# Patient Record
Sex: FEMALE | Race: WHITE | NOT HISPANIC OR LATINO | Employment: OTHER | URBAN - METROPOLITAN AREA
[De-identification: names, ages, dates, MRNs, and addresses within clinical notes are randomized per-mention and may not be internally consistent; named-entity substitution may affect disease eponyms.]

---

## 2023-05-03 DIAGNOSIS — R09.81 NASAL CONGESTION: ICD-10-CM

## 2023-05-03 RX ORDER — FLUTICASONE PROPIONATE 50 MCG
SPRAY, SUSPENSION (ML) NASAL
Qty: 48 ML | Refills: 2 | Status: SHIPPED | OUTPATIENT
Start: 2023-05-03 | End: 2024-02-21

## 2023-05-09 DIAGNOSIS — I10 ESSENTIAL (PRIMARY) HYPERTENSION: ICD-10-CM

## 2023-05-09 RX ORDER — AMLODIPINE BESYLATE 5 MG/1
TABLET ORAL
Qty: 90 TABLET | Refills: 3 | Status: SHIPPED | OUTPATIENT
Start: 2023-05-09

## 2023-05-27 DIAGNOSIS — E03.9 HYPOTHYROIDISM, UNSPECIFIED: ICD-10-CM

## 2023-05-30 RX ORDER — LEVOTHYROXINE SODIUM 125 UG/1
TABLET ORAL
Qty: 90 TABLET | Refills: 2 | Status: SHIPPED | OUTPATIENT
Start: 2023-05-30 | End: 2024-02-20

## 2023-06-13 LAB
ALANINE AMINOTRANSFERASE (SGPT) (U/L) IN SER/PLAS: 15 U/L (ref 7–45)
ALBUMIN (G/DL) IN SER/PLAS: 4.3 G/DL (ref 3.4–5)
ALKALINE PHOSPHATASE (U/L) IN SER/PLAS: 34 U/L (ref 33–136)
ANION GAP IN SER/PLAS: 15 MMOL/L (ref 10–20)
ASPARTATE AMINOTRANSFERASE (SGOT) (U/L) IN SER/PLAS: 20 U/L (ref 9–39)
BASOPHILS (10*3/UL) IN BLOOD BY AUTOMATED COUNT: 0.07 X10E9/L (ref 0–0.1)
BASOPHILS/100 LEUKOCYTES IN BLOOD BY AUTOMATED COUNT: 1.3 % (ref 0–2)
BILIRUBIN TOTAL (MG/DL) IN SER/PLAS: 1 MG/DL (ref 0–1.2)
CALCIDIOL (25 OH VITAMIN D3) (NG/ML) IN SER/PLAS: 39 NG/ML
CALCIUM (MG/DL) IN SER/PLAS: 8.8 MG/DL (ref 8.6–10.3)
CARBON DIOXIDE, TOTAL (MMOL/L) IN SER/PLAS: 25 MMOL/L (ref 21–32)
CHLORIDE (MMOL/L) IN SER/PLAS: 101 MMOL/L (ref 98–107)
CHOLESTEROL (MG/DL) IN SER/PLAS: 225 MG/DL (ref 0–199)
CHOLESTEROL IN HDL (MG/DL) IN SER/PLAS: 69.7 MG/DL
CHOLESTEROL/HDL RATIO: 3.2
CREATININE (MG/DL) IN SER/PLAS: 0.8 MG/DL (ref 0.5–1.05)
EOSINOPHILS (10*3/UL) IN BLOOD BY AUTOMATED COUNT: 0.23 X10E9/L (ref 0–0.4)
EOSINOPHILS/100 LEUKOCYTES IN BLOOD BY AUTOMATED COUNT: 4.4 % (ref 0–6)
ERYTHROCYTE DISTRIBUTION WIDTH (RATIO) BY AUTOMATED COUNT: 13.3 % (ref 11.5–14.5)
ERYTHROCYTE MEAN CORPUSCULAR HEMOGLOBIN CONCENTRATION (G/DL) BY AUTOMATED: 34 G/DL (ref 32–36)
ERYTHROCYTE MEAN CORPUSCULAR VOLUME (FL) BY AUTOMATED COUNT: 95 FL (ref 80–100)
ERYTHROCYTES (10*6/UL) IN BLOOD BY AUTOMATED COUNT: 5.06 X10E12/L (ref 4–5.2)
GFR FEMALE: 78 ML/MIN/1.73M2
GLUCOSE (MG/DL) IN SER/PLAS: 73 MG/DL (ref 74–99)
HEMATOCRIT (%) IN BLOOD BY AUTOMATED COUNT: 48.3 % (ref 36–46)
HEMOGLOBIN (G/DL) IN BLOOD: 16.4 G/DL (ref 12–16)
IMMATURE GRANULOCYTES/100 LEUKOCYTES IN BLOOD BY AUTOMATED COUNT: 0.2 % (ref 0–0.9)
LDL: 134 MG/DL (ref 0–99)
LEUKOCYTES (10*3/UL) IN BLOOD BY AUTOMATED COUNT: 5.3 X10E9/L (ref 4.4–11.3)
LYMPHOCYTES (10*3/UL) IN BLOOD BY AUTOMATED COUNT: 1.05 X10E9/L (ref 0.8–3)
LYMPHOCYTES/100 LEUKOCYTES IN BLOOD BY AUTOMATED COUNT: 19.9 % (ref 13–44)
MONOCYTES (10*3/UL) IN BLOOD BY AUTOMATED COUNT: 0.62 X10E9/L (ref 0.05–0.8)
MONOCYTES/100 LEUKOCYTES IN BLOOD BY AUTOMATED COUNT: 11.7 % (ref 2–10)
NEUTROPHILS (10*3/UL) IN BLOOD BY AUTOMATED COUNT: 3.3 X10E9/L (ref 1.6–5.5)
NEUTROPHILS/100 LEUKOCYTES IN BLOOD BY AUTOMATED COUNT: 62.5 % (ref 40–80)
PLATELETS (10*3/UL) IN BLOOD AUTOMATED COUNT: 243 X10E9/L (ref 150–450)
POTASSIUM (MMOL/L) IN SER/PLAS: 3.9 MMOL/L (ref 3.5–5.3)
PROTEIN TOTAL: 6.8 G/DL (ref 6.4–8.2)
SODIUM (MMOL/L) IN SER/PLAS: 137 MMOL/L (ref 136–145)
THYROTROPIN (MIU/L) IN SER/PLAS BY DETECTION LIMIT <= 0.05 MIU/L: 8.31 MIU/L (ref 0.44–3.98)
THYROXINE (T4) FREE (NG/DL) IN SER/PLAS: 0.87 NG/DL (ref 0.61–1.12)
TRIGLYCERIDE (MG/DL) IN SER/PLAS: 105 MG/DL (ref 0–149)
TRIIODOTHYRONINE (T3) (NG/DL) IN SER/PLAS: 86 NG/DL (ref 60–200)
UREA NITROGEN (MG/DL) IN SER/PLAS: 11 MG/DL (ref 6–23)
VLDL: 21 MG/DL (ref 0–40)

## 2023-07-13 ENCOUNTER — OFFICE VISIT (OUTPATIENT)
Dept: PRIMARY CARE | Facility: CLINIC | Age: 73
End: 2023-07-13
Payer: MEDICARE

## 2023-07-13 VITALS
OXYGEN SATURATION: 100 % | HEART RATE: 60 BPM | DIASTOLIC BLOOD PRESSURE: 83 MMHG | WEIGHT: 160.8 LBS | RESPIRATION RATE: 14 BRPM | SYSTOLIC BLOOD PRESSURE: 131 MMHG | HEIGHT: 62 IN | BODY MASS INDEX: 29.59 KG/M2 | TEMPERATURE: 97.6 F

## 2023-07-13 DIAGNOSIS — E03.8 OTHER SPECIFIED HYPOTHYROIDISM: ICD-10-CM

## 2023-07-13 DIAGNOSIS — D75.1 ERYTHROCYTOSIS: ICD-10-CM

## 2023-07-13 DIAGNOSIS — E78.2 MIXED HYPERLIPIDEMIA: ICD-10-CM

## 2023-07-13 DIAGNOSIS — N95.1 MENOPAUSAL SYMPTOMS: ICD-10-CM

## 2023-07-13 DIAGNOSIS — I83.811 VARICOSE VEINS OF LOWER EXTREMITY WITH PAIN, RIGHT: Primary | ICD-10-CM

## 2023-07-13 DIAGNOSIS — M79.661 PAIN IN RIGHT LOWER LEG: ICD-10-CM

## 2023-07-13 DIAGNOSIS — I10 PRIMARY HYPERTENSION: ICD-10-CM

## 2023-07-13 DIAGNOSIS — D17.21 LIPOMA OF RIGHT AXILLA: ICD-10-CM

## 2023-07-13 PROCEDURE — 1126F AMNT PAIN NOTED NONE PRSNT: CPT | Performed by: INTERNAL MEDICINE

## 2023-07-13 PROCEDURE — 1036F TOBACCO NON-USER: CPT | Performed by: INTERNAL MEDICINE

## 2023-07-13 PROCEDURE — 1160F RVW MEDS BY RX/DR IN RCRD: CPT | Performed by: INTERNAL MEDICINE

## 2023-07-13 PROCEDURE — 1159F MED LIST DOCD IN RCRD: CPT | Performed by: INTERNAL MEDICINE

## 2023-07-13 PROCEDURE — 3079F DIAST BP 80-89 MM HG: CPT | Performed by: INTERNAL MEDICINE

## 2023-07-13 PROCEDURE — 99214 OFFICE O/P EST MOD 30 MIN: CPT | Performed by: INTERNAL MEDICINE

## 2023-07-13 PROCEDURE — 3075F SYST BP GE 130 - 139MM HG: CPT | Performed by: INTERNAL MEDICINE

## 2023-07-13 RX ORDER — LIOTHYRONINE SODIUM 5 UG/1
2 TABLET ORAL DAILY
COMMUNITY
Start: 2013-09-14 | End: 2023-10-27

## 2023-07-13 RX ORDER — VALACYCLOVIR HYDROCHLORIDE 1 G/1
TABLET, FILM COATED ORAL
COMMUNITY
End: 2023-08-18 | Stop reason: SDUPTHER

## 2023-07-13 RX ORDER — NORETHINDRONE 5 MG/1
1 TABLET ORAL DAILY
COMMUNITY
Start: 2020-10-27 | End: 2023-10-23 | Stop reason: SDUPTHER

## 2023-07-13 RX ORDER — TRETINOIN 1 MG/G
1 CREAM TOPICAL
COMMUNITY
Start: 2014-12-10

## 2023-07-13 RX ORDER — ESTRADIOL 0.5 MG/1
0.5 TABLET ORAL DAILY
COMMUNITY

## 2023-07-13 ASSESSMENT — ENCOUNTER SYMPTOMS
GASTROINTESTINAL NEGATIVE: 1
CHEST TIGHTNESS: 0
CONSTITUTIONAL NEGATIVE: 1
WOUND: 0
CONSTIPATION: 0
SPEECH DIFFICULTY: 0
ALLERGIC/IMMUNOLOGIC NEGATIVE: 1
VOMITING: 0
ACTIVITY CHANGE: 0
NAUSEA: 0
VOICE CHANGE: 0
FLANK PAIN: 0
SEIZURES: 0
CARDIOVASCULAR NEGATIVE: 1
COUGH: 0
NUMBNESS: 0
POLYDIPSIA: 0
BACK PAIN: 0
SLEEP DISTURBANCE: 0
OCCASIONAL FEELINGS OF UNSTEADINESS: 0
WEAKNESS: 0
DIARRHEA: 0
NECK PAIN: 0
BLOOD IN STOOL: 0
FREQUENCY: 0
DIZZINESS: 0
JOINT SWELLING: 0
POLYPHAGIA: 0
STRIDOR: 0
RESPIRATORY NEGATIVE: 1
DIFFICULTY URINATING: 0
CONFUSION: 0
EYES NEGATIVE: 1
HEMATOLOGIC/LYMPHATIC NEGATIVE: 1
PSYCHIATRIC NEGATIVE: 1
MYALGIAS: 1
PALPITATIONS: 0
ABDOMINAL PAIN: 0
COLOR CHANGE: 0
UNEXPECTED WEIGHT CHANGE: 0
NECK STIFFNESS: 0
HALLUCINATIONS: 0
BRUISES/BLEEDS EASILY: 0
AGITATION: 0
SORE THROAT: 0
TROUBLE SWALLOWING: 0
NEUROLOGICAL NEGATIVE: 1
NERVOUS/ANXIOUS: 0
FACIAL ASYMMETRY: 0
WHEEZING: 0
EYE REDNESS: 0
ADENOPATHY: 0
LOSS OF SENSATION IN FEET: 0
EYE PAIN: 0
ENDOCRINE NEGATIVE: 1
DYSURIA: 0
TREMORS: 0
SHORTNESS OF BREATH: 0
EYE DISCHARGE: 0
SINUS PRESSURE: 0
SINUS PAIN: 0
APPETITE CHANGE: 0
ARTHRALGIAS: 0
LIGHT-HEADEDNESS: 0
HEADACHES: 0
DEPRESSION: 0

## 2023-07-13 ASSESSMENT — PATIENT HEALTH QUESTIONNAIRE - PHQ9
1. LITTLE INTEREST OR PLEASURE IN DOING THINGS: NOT AT ALL
2. FEELING DOWN, DEPRESSED OR HOPELESS: NOT AT ALL
SUM OF ALL RESPONSES TO PHQ9 QUESTIONS 1 AND 2: 0

## 2023-07-13 NOTE — PROGRESS NOTES
Subjective   Patient ID: Mara Montoya is a 72 y.o. female who presents for Follow-up (Patient is here for a follow up./No new concerns. ).  HPI    Patient has been feeling pretty good and has been complaint with prescribed medications.    We reviewed and discussed details of recent blood work: CBC, CMP, TSH, Lipid panel, T3, T4 and Vit D Results within acceptable range.  Mildly elevated Hb and  cholesterol noted.   She saw endocrinology regarding elevated TSH.  Mammogram in May was negative.    US right breast showed mass consistent with lipoma  Saw Dr. Jett, planing surgery on 7/21/23.    Patient saw ortho Dr. Sun at [facility] regarding right lower leg pain, advised PT.  I reviewed visit records (via Epic)    Concerned about varicose veins with pain in right leg.    Review of Systems   Constitutional: Negative.  Negative for activity change, appetite change and unexpected weight change.   HENT: Negative.  Negative for congestion, ear discharge, ear pain, hearing loss, mouth sores, nosebleeds, sinus pressure, sinus pain, sore throat, trouble swallowing and voice change.    Eyes: Negative.  Negative for pain, discharge, redness and visual disturbance.   Respiratory: Negative.  Negative for cough, chest tightness, shortness of breath, wheezing and stridor.    Cardiovascular: Negative.  Negative for chest pain, palpitations and leg swelling.   Gastrointestinal: Negative.  Negative for abdominal pain, blood in stool, constipation, diarrhea, nausea and vomiting.   Endocrine: Negative.  Negative for polydipsia, polyphagia and polyuria.   Genitourinary: Negative.  Negative for difficulty urinating, dysuria, flank pain, frequency and urgency.   Musculoskeletal:  Positive for myalgias. Negative for arthralgias, back pain, gait problem, joint swelling, neck pain and neck stiffness.   Skin: Negative.  Negative for color change, rash and wound.   Allergic/Immunologic: Negative.  Negative for environmental allergies, food  "allergies and immunocompromised state.   Neurological: Negative.  Negative for dizziness, tremors, seizures, syncope, facial asymmetry, speech difficulty, weakness, light-headedness, numbness and headaches.   Hematological: Negative.  Negative for adenopathy. Does not bruise/bleed easily.   Psychiatric/Behavioral: Negative.  Negative for agitation, behavioral problems, confusion, hallucinations, sleep disturbance and suicidal ideas. The patient is not nervous/anxious.    All other systems reviewed and are negative.      Objective     Review of systems was performed and all systems were negative except what in HPI    /83   Pulse 60   Temp 36.4 °C (97.6 °F)   Resp 14   Ht 1.575 m (5' 2\")   Wt 72.9 kg (160 lb 12.8 oz)   SpO2 100%   BMI 29.41 kg/m²    Physical Exam  Vitals and nursing note reviewed.   Constitutional:       General: She is not in acute distress.     Appearance: Normal appearance.   HENT:      Head: Normocephalic and atraumatic.      Right Ear: External ear normal.      Left Ear: External ear normal.      Nose: Nose normal. No congestion or rhinorrhea.   Eyes:      General:         Right eye: No discharge.         Left eye: No discharge.      Extraocular Movements: Extraocular movements intact.      Conjunctiva/sclera: Conjunctivae normal.      Pupils: Pupils are equal, round, and reactive to light.   Cardiovascular:      Rate and Rhythm: Normal rate and regular rhythm.      Pulses: Normal pulses.      Heart sounds: Normal heart sounds. No murmur heard.     No friction rub. No gallop.      Comments: Varicose veins noted in lower extremities, right more than left  Pulmonary:      Effort: Pulmonary effort is normal. No respiratory distress.      Breath sounds: Normal breath sounds. No stridor. No wheezing, rhonchi or rales.   Chest:      Chest wall: No tenderness.   Abdominal:      General: Bowel sounds are normal.      Palpations: Abdomen is soft. There is no mass.      Tenderness: There is no " abdominal tenderness. There is no guarding or rebound.   Musculoskeletal:         General: No swelling or deformity. Normal range of motion.      Cervical back: Normal range of motion and neck supple. No rigidity or tenderness.      Right lower leg: No edema.      Left lower leg: No edema.   Lymphadenopathy:      Cervical: No cervical adenopathy.   Skin:     General: Skin is warm and dry.      Coloration: Skin is not jaundiced.      Findings: No bruising or erythema.   Neurological:      General: No focal deficit present.      Mental Status: She is alert and oriented to person, place, and time. Mental status is at baseline.      Cranial Nerves: No cranial nerve deficit.      Motor: No weakness.      Coordination: Coordination normal.      Gait: Gait normal.   Psychiatric:         Mood and Affect: Mood normal.         Behavior: Behavior normal.         Thought Content: Thought content normal.         Judgment: Judgment normal.         Assessment/Plan   Problem List Items Addressed This Visit          Cardiac and Vasculature    Varicose veins of lower extremity with pain, right - Primary     Consult vascular surgery         Relevant Orders    Referral to Vascular Surgery    Mixed hyperlipidemia     Low cholesterol and low carbohydrate diet is advised.          Primary hypertension     Controlled. Continue current treatment.             Endocrine/Metabolic    Other specified hypothyroidism     Clinically stable. F/up with endocrinology.             Genitourinary and Reproductive    Menopausal symptoms     Clinically stable. Continue current treatment.             Hematology and Neoplasia    Lipoma of right axilla     F/up with surgeon Dr. Jett.         Erythrocytosis     Clinically stable. Will monitor.             Musculoskeletal and Injuries    Pain in right lower leg     F/up with PT.           It was a pleasure to see you today.  I would like to remind you about importance of a healthy lifestyle in order to  improve your well-being and live longer.  Try to engage in physical activities for at least 150 minutes per week.  Eat about 10 servings of fruits and vegetables daily. My advice is 2 servings of fruits and 8 servings of vegetables.  For vegetables choose at least half of them green and at least half of them fresh.  Please avoid sugar, salt, fried food and saturated fat.\    I spent a total of 38 minutes on the date of service for follow up visit, which included preparing to see the patient, face-to-face patient care, completing clinical documentation, obtaining and/or reviewing separately obtained history, performing a medically appropriate examination, counseling and educating the patient/family/caregiver, ordering medications, tests, or procedures, communicating with other health care providers (not separately reported), independently interpreting results (not separately reported), communicating results to the patient/family/caregiver, and care coordination (not separately reported).      F/up in Jan 2024 or sooner if needed.

## 2023-07-14 LAB
ANION GAP IN SER/PLAS: 9 MMOL/L (ref 10–20)
CALCIUM (MG/DL) IN SER/PLAS: 9.1 MG/DL (ref 8.6–10.3)
CARBON DIOXIDE, TOTAL (MMOL/L) IN SER/PLAS: 29 MMOL/L (ref 21–32)
CHLORIDE (MMOL/L) IN SER/PLAS: 104 MMOL/L (ref 98–107)
CREATININE (MG/DL) IN SER/PLAS: 0.72 MG/DL (ref 0.5–1.05)
ERYTHROCYTE DISTRIBUTION WIDTH (RATIO) BY AUTOMATED COUNT: 13 % (ref 11.5–14.5)
ERYTHROCYTE MEAN CORPUSCULAR HEMOGLOBIN CONCENTRATION (G/DL) BY AUTOMATED: 33.3 G/DL (ref 32–36)
ERYTHROCYTE MEAN CORPUSCULAR VOLUME (FL) BY AUTOMATED COUNT: 94 FL (ref 80–100)
ERYTHROCYTES (10*6/UL) IN BLOOD BY AUTOMATED COUNT: 4.93 X10E12/L (ref 4–5.2)
GFR FEMALE: 89 ML/MIN/1.73M2
GLUCOSE (MG/DL) IN SER/PLAS: 147 MG/DL (ref 74–99)
HEMATOCRIT (%) IN BLOOD BY AUTOMATED COUNT: 46.3 % (ref 36–46)
HEMOGLOBIN (G/DL) IN BLOOD: 15.4 G/DL (ref 12–16)
LEUKOCYTES (10*3/UL) IN BLOOD BY AUTOMATED COUNT: 5.4 X10E9/L (ref 4.4–11.3)
NRBC (PER 100 WBCS) BY AUTOMATED COUNT: 0 /100 WBC (ref 0–0)
PLATELETS (10*3/UL) IN BLOOD AUTOMATED COUNT: 231 X10E9/L (ref 150–450)
POTASSIUM (MMOL/L) IN SER/PLAS: 3.3 MMOL/L (ref 3.5–5.3)
SODIUM (MMOL/L) IN SER/PLAS: 139 MMOL/L (ref 136–145)
UREA NITROGEN (MG/DL) IN SER/PLAS: 11 MG/DL (ref 6–23)

## 2023-07-21 ENCOUNTER — HOSPITAL ENCOUNTER (OUTPATIENT)
Dept: DATA CONVERSION | Facility: HOSPITAL | Age: 73
End: 2023-07-21
Attending: SURGERY | Admitting: SURGERY
Payer: MEDICARE

## 2023-07-21 DIAGNOSIS — D17.1 BENIGN LIPOMATOUS NEOPLASM OF SKIN AND SUBCUTANEOUS TISSUE OF TRUNK: ICD-10-CM

## 2023-07-21 DIAGNOSIS — D17.21 BENIGN LIPOMATOUS NEOPLASM OF SKIN AND SUBCUTANEOUS TISSUE OF RIGHT ARM: ICD-10-CM

## 2023-07-28 LAB
COMPLETE PATHOLOGY REPORT: NORMAL
CONVERTED CLINICAL DIAGNOSIS-HISTORY: NORMAL
CONVERTED FINAL DIAGNOSIS: NORMAL
CONVERTED FINAL REPORT PDF LINK TO COPY AND PASTE: NORMAL
CONVERTED GROSS DESCRIPTION: NORMAL

## 2023-08-18 DIAGNOSIS — B00.1 COLD SORE: Primary | ICD-10-CM

## 2023-08-18 RX ORDER — VALACYCLOVIR HYDROCHLORIDE 1 G/1
TABLET, FILM COATED ORAL
Qty: 4 TABLET | Refills: 0 | Status: SHIPPED | OUTPATIENT
Start: 2023-08-18 | End: 2023-10-09

## 2023-08-31 PROBLEM — H26.9 CATARACT: Status: ACTIVE | Noted: 2023-08-31

## 2023-08-31 PROBLEM — R03.0 BLOOD PRESSURE ELEVATED WITHOUT HISTORY OF HTN: Status: ACTIVE | Noted: 2023-08-31

## 2023-08-31 PROBLEM — S63.639A SPRAIN AND STRAIN OF INTERPHALANGEAL (JOINT) OF HAND: Status: ACTIVE | Noted: 2023-08-31

## 2023-08-31 PROBLEM — K63.89 COLONIC MASS: Status: ACTIVE | Noted: 2023-08-31

## 2023-08-31 PROBLEM — M51.37 DDD (DEGENERATIVE DISC DISEASE), LUMBOSACRAL: Status: ACTIVE | Noted: 2023-08-31

## 2023-08-31 PROBLEM — N95.0 POST-MENOPAUSAL BLEEDING: Status: ACTIVE | Noted: 2023-08-31

## 2023-08-31 PROBLEM — H11.153 PINGUECULA OF BOTH EYES: Status: ACTIVE | Noted: 2023-08-31

## 2023-08-31 PROBLEM — M85.80 OSTEOPENIA: Status: ACTIVE | Noted: 2023-08-31

## 2023-08-31 PROBLEM — H52.10 MYOPIA WITH ASTIGMATISM AND PRESBYOPIA: Status: ACTIVE | Noted: 2023-08-31

## 2023-08-31 PROBLEM — M47.816 LUMBAR SPONDYLOSIS: Status: ACTIVE | Noted: 2023-08-31

## 2023-08-31 PROBLEM — M72.2 PLANTAR FASCIITIS, LEFT: Status: ACTIVE | Noted: 2023-08-31

## 2023-08-31 PROBLEM — D31.02 NEVUS OF LEFT CONJUNCTIVA: Status: ACTIVE | Noted: 2023-08-31

## 2023-08-31 PROBLEM — G43.109 MIGRAINE WITH AURA AND WITHOUT STATUS MIGRAINOSUS, NOT INTRACTABLE: Status: ACTIVE | Noted: 2023-08-31

## 2023-08-31 PROBLEM — M76.32 ILIOTIBIAL BAND FRICTION SYNDROME OF BOTH KNEES: Status: ACTIVE | Noted: 2023-08-31

## 2023-08-31 PROBLEM — E55.9 VITAMIN D DEFICIENCY: Status: ACTIVE | Noted: 2023-08-31

## 2023-08-31 PROBLEM — S66.819A SPRAIN AND STRAIN OF INTERPHALANGEAL (JOINT) OF HAND: Status: ACTIVE | Noted: 2023-08-31

## 2023-08-31 PROBLEM — D36.13 NEUROMA OF LEFT LOWER EXTREMITY: Status: ACTIVE | Noted: 2023-08-31

## 2023-08-31 PROBLEM — M40.00 KYPHOSIS (ACQUIRED) (POSTURAL): Status: ACTIVE | Noted: 2023-08-31

## 2023-08-31 PROBLEM — D12.1 APPENDICEAL MUCINOUS CYSTADENOMA: Status: ACTIVE | Noted: 2023-08-31

## 2023-08-31 PROBLEM — H52.209 MYOPIA WITH ASTIGMATISM AND PRESBYOPIA: Status: ACTIVE | Noted: 2023-08-31

## 2023-08-31 PROBLEM — H66.92 ACUTE LEFT OTITIS MEDIA: Status: ACTIVE | Noted: 2023-08-31

## 2023-08-31 PROBLEM — K63.9 SUBMUCOSAL COLONIC LESION: Status: ACTIVE | Noted: 2023-08-31

## 2023-08-31 PROBLEM — I83.813 VARICOSE VEINS OF BOTH LOWER EXTREMITIES WITH PAIN: Status: ACTIVE | Noted: 2023-08-31

## 2023-08-31 PROBLEM — R21 RASH AND NONSPECIFIC SKIN ERUPTION: Status: ACTIVE | Noted: 2023-08-31

## 2023-08-31 PROBLEM — H53.9 VISION CHANGES: Status: ACTIVE | Noted: 2023-08-31

## 2023-08-31 PROBLEM — M99.09 SEGMENTAL AND SOMATIC DYSFUNCTION: Status: ACTIVE | Noted: 2023-08-31

## 2023-08-31 PROBLEM — M54.16 LUMBAR RADICULOPATHY: Status: ACTIVE | Noted: 2023-08-31

## 2023-08-31 PROBLEM — M51.379 DDD (DEGENERATIVE DISC DISEASE), LUMBOSACRAL: Status: ACTIVE | Noted: 2023-08-31

## 2023-08-31 PROBLEM — S92.353A CLOSED FRACTURE OF BASE OF FIFTH METATARSAL BONE: Status: ACTIVE | Noted: 2023-08-31

## 2023-08-31 PROBLEM — M76.31 ILIOTIBIAL BAND FRICTION SYNDROME OF BOTH KNEES: Status: ACTIVE | Noted: 2023-08-31

## 2023-08-31 PROBLEM — M16.11 PRIMARY OSTEOARTHRITIS OF RIGHT HIP: Status: ACTIVE | Noted: 2023-08-31

## 2023-08-31 PROBLEM — B35.1 FUNGAL NAIL INFECTION: Status: ACTIVE | Noted: 2023-08-31

## 2023-08-31 PROBLEM — S92.352G: Status: ACTIVE | Noted: 2023-08-31

## 2023-08-31 PROBLEM — H52.4 MYOPIA WITH ASTIGMATISM AND PRESBYOPIA: Status: ACTIVE | Noted: 2023-08-31

## 2023-08-31 PROBLEM — H25.813 COMBINED FORM OF SENILE CATARACT OF BOTH EYES: Status: ACTIVE | Noted: 2023-08-31

## 2023-08-31 PROBLEM — H52.13 MYOPIA OF BOTH EYES: Status: ACTIVE | Noted: 2023-08-31

## 2023-08-31 PROBLEM — M54.17 LUMBOSACRAL RADICULITIS: Status: ACTIVE | Noted: 2023-08-31

## 2023-08-31 PROBLEM — R09.81 NASAL CONGESTION: Status: ACTIVE | Noted: 2023-08-31

## 2023-08-31 PROBLEM — M77.9 TENDONITIS: Status: ACTIVE | Noted: 2023-08-31

## 2023-08-31 RX ORDER — OXYCODONE AND ACETAMINOPHEN 5; 325 MG/1; MG/1
TABLET ORAL
COMMUNITY
Start: 2023-07-21 | End: 2024-01-12 | Stop reason: ALTCHOICE

## 2023-09-20 ENCOUNTER — CLINICAL SUPPORT (OUTPATIENT)
Dept: PRIMARY CARE | Facility: CLINIC | Age: 73
End: 2023-09-20
Payer: MEDICARE

## 2023-09-20 PROCEDURE — 90662 IIV NO PRSV INCREASED AG IM: CPT | Performed by: INTERNAL MEDICINE

## 2023-09-20 PROCEDURE — G0008 ADMIN INFLUENZA VIRUS VAC: HCPCS | Performed by: INTERNAL MEDICINE

## 2023-09-20 NOTE — PROGRESS NOTES
Patient presents in the office for Flu vaccine.     This drug was administered by Leila Thomson MA at 11:37 AM per physician order.    Patient tolerated well.

## 2023-09-29 VITALS — WEIGHT: 159.83 LBS | HEIGHT: 62 IN | BODY MASS INDEX: 29.41 KG/M2

## 2023-09-30 NOTE — H&P
History & Physical Reviewed:   I have reviewed the History and Physical dated:  14-Jul-2023   History and Physical reviewed and relevant findings noted. Patient examined to review pertinent physical  findings.: No significant changes   Home Medications Reviewed: no changes noted   Allergies Reviewed: no changes noted       ERAS (Enhanced Recovery After Surgery):  ·  ERAS Patient: no     Consent:   COVID-19 Consent:  ·  COVID-19 Risk Consent Surgeon has reviewed key risks related to the risk of amy COVID-19 and if they contract COVID-19 what the risks are.       Electronic Signatures:  Jacqueline Jett ()  (Signed 21-Jul-2023 09:32)   Authored: History & Physical Reviewed, ERAS, Consent,  Note Completion      Last Updated: 21-Jul-2023 09:32 by Jacqueline Jett ()

## 2023-10-02 NOTE — OP NOTE
PROCEDURE DETAILS    Preoperative Diagnosis:  lipomatous mass right breast    Postoperative Diagnosis:  lipomatous mass right breast    Surgeon: Jacqueline Jett  Resident/Fellow/Other Assistant: Annalee Montesinos    Procedure:  1. RIGHT BREAST EXCISIONAL BIOPSY    Anesthesia: No anesthesiologist associated with this case  Estimated Blood Loss: 5ml  Findings: 1. large lipomatous mass of the axillary tail of the breast clinically resembling a lipoma completely excised        Operative Report:   The patient was identified by name and birth date and transported to the operative suite and placed supine on the OR table.  A sign-in was performed verifying  patient identity, procedure and laterality.  One dose of preoperative antibiotics was given.  After induction of anesthesia the right breast and axilla were prepped and draped in the usual sterile fashion.  Ultrasound was used to confirm the location  of the lesion to be excised.  Just prior to incision, a time-out was performed confirming patient identity, procedure and laterality.  A crescent shaped incision was made, and flaps were raised in the direction of the palpable lesion.  The target lesion  was then circumferentially dissected using electrocautery and blunt dissection.  Once the mass was completely dissected, it was marked for orientation using a silk suture- short suture superior, long lateral and passed off the field.  The dermis was closed  with 3-0 Vicryl suture and the skin was closed with a running 4-0 Monocryl.  Benzoin and steri-strips were used as dressing.  The patient was then awoken from anesthesia and transported to the PACU in satisfactory condition.    SPECIMEN:  1. right breast excisional biopsy- short suture superior, long lateral                        Attestation:   Note Completion:  Attending Attestation I performed the procedure without a resident         Electronic Signatures:  Jacqueline Jett (DO)  (Signed 21-Jul-2023  10:54)   Authored: Post-Operative Note, Chart Review, Note Completion      Last Updated: 21-Jul-2023 10:54 by Jacqueline Jett (DO)

## 2023-10-08 DIAGNOSIS — B00.1 COLD SORE: ICD-10-CM

## 2023-10-09 ENCOUNTER — APPOINTMENT (OUTPATIENT)
Dept: OPHTHALMOLOGY | Facility: CLINIC | Age: 73
End: 2023-10-09
Payer: MEDICARE

## 2023-10-09 RX ORDER — VALACYCLOVIR HYDROCHLORIDE 1 G/1
TABLET, FILM COATED ORAL
Qty: 12 TABLET | Refills: 1 | Status: SHIPPED | OUTPATIENT
Start: 2023-10-09 | End: 2023-10-19 | Stop reason: SDUPTHER

## 2023-10-19 ENCOUNTER — OFFICE VISIT (OUTPATIENT)
Dept: PRIMARY CARE | Facility: CLINIC | Age: 73
End: 2023-10-19
Payer: MEDICARE

## 2023-10-19 VITALS
HEIGHT: 62 IN | DIASTOLIC BLOOD PRESSURE: 80 MMHG | HEART RATE: 71 BPM | BODY MASS INDEX: 29.81 KG/M2 | WEIGHT: 162 LBS | SYSTOLIC BLOOD PRESSURE: 136 MMHG | TEMPERATURE: 97.7 F | OXYGEN SATURATION: 99 %

## 2023-10-19 DIAGNOSIS — E03.8 OTHER SPECIFIED HYPOTHYROIDISM: ICD-10-CM

## 2023-10-19 DIAGNOSIS — I10 PRIMARY HYPERTENSION: ICD-10-CM

## 2023-10-19 DIAGNOSIS — E78.2 MIXED HYPERLIPIDEMIA: ICD-10-CM

## 2023-10-19 DIAGNOSIS — B00.1 COLD SORE: ICD-10-CM

## 2023-10-19 DIAGNOSIS — D75.1 ERYTHROCYTOSIS: ICD-10-CM

## 2023-10-19 DIAGNOSIS — M85.89 OSTEOPENIA OF MULTIPLE SITES: ICD-10-CM

## 2023-10-19 DIAGNOSIS — E66.3 OVERWEIGHT WITH BODY MASS INDEX (BMI) OF 29 TO 29.9 IN ADULT: Primary | ICD-10-CM

## 2023-10-19 PROCEDURE — 3075F SYST BP GE 130 - 139MM HG: CPT | Performed by: INTERNAL MEDICINE

## 2023-10-19 PROCEDURE — 1126F AMNT PAIN NOTED NONE PRSNT: CPT | Performed by: INTERNAL MEDICINE

## 2023-10-19 PROCEDURE — 1159F MED LIST DOCD IN RCRD: CPT | Performed by: INTERNAL MEDICINE

## 2023-10-19 PROCEDURE — 3079F DIAST BP 80-89 MM HG: CPT | Performed by: INTERNAL MEDICINE

## 2023-10-19 PROCEDURE — 1036F TOBACCO NON-USER: CPT | Performed by: INTERNAL MEDICINE

## 2023-10-19 PROCEDURE — 99214 OFFICE O/P EST MOD 30 MIN: CPT | Performed by: INTERNAL MEDICINE

## 2023-10-19 PROCEDURE — 3008F BODY MASS INDEX DOCD: CPT | Performed by: INTERNAL MEDICINE

## 2023-10-19 PROCEDURE — 1160F RVW MEDS BY RX/DR IN RCRD: CPT | Performed by: INTERNAL MEDICINE

## 2023-10-19 RX ORDER — SEMAGLUTIDE 0.25 MG/.5ML
0.25 INJECTION, SOLUTION SUBCUTANEOUS
Qty: 2 ML | Refills: 0 | Status: SHIPPED | OUTPATIENT
Start: 2023-10-19 | End: 2024-01-12 | Stop reason: ALTCHOICE

## 2023-10-19 RX ORDER — VALACYCLOVIR HYDROCHLORIDE 1 G/1
TABLET, FILM COATED ORAL
Qty: 24 TABLET | Refills: 1 | Status: SHIPPED | OUTPATIENT
Start: 2023-10-19

## 2023-10-19 ASSESSMENT — ENCOUNTER SYMPTOMS
POLYDIPSIA: 0
NECK STIFFNESS: 0
JOINT SWELLING: 0
OCCASIONAL FEELINGS OF UNSTEADINESS: 0
SLEEP DISTURBANCE: 0
DIFFICULTY URINATING: 0
PALPITATIONS: 0
BACK PAIN: 0
UNEXPECTED WEIGHT CHANGE: 0
EYE PAIN: 0
GASTROINTESTINAL NEGATIVE: 1
APPETITE CHANGE: 0
NUMBNESS: 0
ENDOCRINE NEGATIVE: 1
COUGH: 0
FLANK PAIN: 0
EYES NEGATIVE: 1
NAUSEA: 0
WEAKNESS: 0
HEMATOLOGIC/LYMPHATIC NEGATIVE: 1
SORE THROAT: 0
WHEEZING: 0
SINUS PAIN: 0
RESPIRATORY NEGATIVE: 1
HEADACHES: 0
CARDIOVASCULAR NEGATIVE: 1
MYALGIAS: 0
NECK PAIN: 0
ADENOPATHY: 0
FREQUENCY: 0
CONSTIPATION: 0
LIGHT-HEADEDNESS: 0
POLYPHAGIA: 0
WOUND: 0
SPEECH DIFFICULTY: 0
VOICE CHANGE: 0
DEPRESSION: 0
EYE REDNESS: 0
ABDOMINAL PAIN: 0
NERVOUS/ANXIOUS: 0
HALLUCINATIONS: 0
SEIZURES: 0
CHEST TIGHTNESS: 0
LOSS OF SENSATION IN FEET: 0
DIZZINESS: 0
COLOR CHANGE: 0
DYSURIA: 0
EYE DISCHARGE: 0
PSYCHIATRIC NEGATIVE: 1
ACTIVITY CHANGE: 0
VOMITING: 0
SINUS PRESSURE: 0
TREMORS: 0
DIARRHEA: 0
ALLERGIC/IMMUNOLOGIC NEGATIVE: 1
TROUBLE SWALLOWING: 0
SHORTNESS OF BREATH: 0
BRUISES/BLEEDS EASILY: 0
FACIAL ASYMMETRY: 0
STRIDOR: 0
CONSTITUTIONAL NEGATIVE: 1
CONFUSION: 0
AGITATION: 0
BLOOD IN STOOL: 0

## 2023-10-19 ASSESSMENT — PATIENT HEALTH QUESTIONNAIRE - PHQ9
2. FEELING DOWN, DEPRESSED OR HOPELESS: NOT AT ALL
SUM OF ALL RESPONSES TO PHQ9 QUESTIONS 1 AND 2: 0
1. LITTLE INTEREST OR PLEASURE IN DOING THINGS: NOT AT ALL

## 2023-10-19 NOTE — PROGRESS NOTES
Subjective   Patient ID: Mara Montoya is a 73 y.o. female who presents for Follow-up (Patient is here to follow up on leg issues, lower leg pain and varicose veins./Had a flu vaccine 9/2023.).  HPI    Patient has been feeling pretty good and has been complaint with prescribed medications.  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  Concerned about difficulties losing weight.     We reviewed and discussed details of recent blood work: CBC, CMP, TSH, Lipid panel, Hb A1c, Vit D done in 2023.  Results within acceptable range.    Saw vascular surgeon Dr. Hoffman, advised compression stockings.     Saw ortho at [facility] for second opinion regarding right lower leg pain.  Completed PT as recommended by ortho, she continues exercises advised by PT.     Patient used to see pain management Dr. Gardiner for lower back pain.    Patient is aware that losing weight may help to manage her medical conditions.    We discussed treatment options for weight loss. Patient tried but so far has not been successful with diet and exercise for weight loss.    Patient  understands that healthy low calorie diet (1800 lacy) and exercise at least 150 min weekly is recommended  in addition to pharmacological treatment.  Patient agreed to try semaglutide.  We discussed contraindications including medullary thyroid ca, and Multiple Neoplasia syndrome type 2.  Also possible side effects discussed: nausea, anaphylaxis, angioedema, pancreatitis, gallbladder disease  Patient will let me know if any any issues arise after injections started.    Her 27 yo son has been taking Wegovy with good effects, patient is willing to try, aware that it may not be covered by her insurance.        Review of Systems   Constitutional: Negative.  Negative for activity change, appetite change and unexpected weight change.   HENT: Negative.  Negative for congestion, ear discharge, ear pain, hearing loss, mouth sores, nosebleeds, sinus pressure, sinus pain, sore  "throat, trouble swallowing and voice change.    Eyes: Negative.  Negative for pain, discharge, redness and visual disturbance.   Respiratory: Negative.  Negative for cough, chest tightness, shortness of breath, wheezing and stridor.    Cardiovascular: Negative.  Negative for chest pain, palpitations and leg swelling.   Gastrointestinal: Negative.  Negative for abdominal pain, blood in stool, constipation, diarrhea, nausea and vomiting.   Endocrine: Negative.  Negative for polydipsia, polyphagia and polyuria.   Genitourinary: Negative.  Negative for difficulty urinating, dysuria, flank pain, frequency and urgency.   Musculoskeletal:  Positive for arthralgias and gait problem. Negative for back pain, joint swelling, myalgias, neck pain and neck stiffness.   Skin: Negative.  Negative for color change, rash and wound.   Allergic/Immunologic: Negative.  Negative for environmental allergies, food allergies and immunocompromised state.   Neurological:  Negative for dizziness, tremors, seizures, syncope, facial asymmetry, speech difficulty, weakness, light-headedness, numbness and headaches.   Hematological: Negative.  Negative for adenopathy. Does not bruise/bleed easily.   Psychiatric/Behavioral: Negative.  Negative for agitation, behavioral problems, confusion, hallucinations, sleep disturbance and suicidal ideas. The patient is not nervous/anxious.    All other systems reviewed and are negative.      Objective     Review of systems was performed and all systems were negative except what in HPI    /80   Pulse 71   Temp 36.5 °C (97.7 °F)   Ht 1.575 m (5' 2\")   Wt 73.5 kg (162 lb)   SpO2 99%   BMI 29.63 kg/m²      Physical Exam  Vitals and nursing note reviewed.   Constitutional:       General: She is not in acute distress.     Appearance: Normal appearance.   HENT:      Head: Normocephalic and atraumatic.      Right Ear: External ear normal.      Left Ear: External ear normal.      Nose: Nose normal. No " congestion or rhinorrhea.   Eyes:      General:         Right eye: No discharge.         Left eye: No discharge.      Extraocular Movements: Extraocular movements intact.      Conjunctiva/sclera: Conjunctivae normal.      Pupils: Pupils are equal, round, and reactive to light.   Cardiovascular:      Rate and Rhythm: Normal rate and regular rhythm.      Pulses: Normal pulses.      Heart sounds: Normal heart sounds. No murmur heard.     No friction rub. No gallop.   Pulmonary:      Effort: Pulmonary effort is normal. No respiratory distress.      Breath sounds: Normal breath sounds. No stridor. No wheezing, rhonchi or rales.   Chest:      Chest wall: No tenderness.   Abdominal:      General: Bowel sounds are normal.      Palpations: Abdomen is soft. There is no mass.      Tenderness: There is no abdominal tenderness. There is no guarding or rebound.   Musculoskeletal:         General: No swelling or deformity. Normal range of motion.      Cervical back: Normal range of motion and neck supple. No rigidity or tenderness.      Right lower leg: No edema.      Left lower leg: No edema.   Lymphadenopathy:      Cervical: No cervical adenopathy.   Skin:     General: Skin is warm and dry.      Coloration: Skin is not jaundiced.      Findings: No bruising or erythema.   Neurological:      General: No focal deficit present.      Mental Status: She is alert and oriented to person, place, and time. Mental status is at baseline.      Cranial Nerves: No cranial nerve deficit.      Motor: No weakness.      Coordination: Coordination normal.      Gait: Gait normal.   Psychiatric:         Mood and Affect: Mood normal.         Behavior: Behavior normal.         Thought Content: Thought content normal.         Judgment: Judgment normal.         Assessment/Plan   Problem List Items Addressed This Visit             ICD-10-CM       Cardiac and Vasculature    Mixed hyperlipidemia E78.2     Low cholesterol and low carbohydrate diet is  advised.          Primary hypertension I10     Controlled. Continue current treatment.             Endocrine/Metabolic    Other specified hypothyroidism E03.8     Clinically stable. F/up with endocrinology.          Overweight with body mass index (BMI) of 29 to 29.9 in adult - Primary E66.3, Z68.29    Relevant Medications    semaglutide, weight loss, (Wegovy) 0.25 mg/0.5 mL pen injector       Hematology and Neoplasia    Erythrocytosis D75.1     Clinically stable. Will monitor.             Infectious Diseases    Cold sore B00.1    Relevant Medications    valACYclovir (Valtrex) 1 gram tablet       Musculoskeletal and Injuries    Osteopenia M85.80     Please maintain enough calcium in your diet:  6089-7170 mg daily (consume foods rich in calcium rather than taking only calcium supplement to meet daily calcium requirements), take daily Vitamin D supplement with meal. Average Vit D dose is 2000 international units daily.  Weight bearing exercise routine is recommended.         It was a pleasure to see you today.  I would like to remind you about importance of a healthy lifestyle in order to improve your well-being and live longer.  Try to engage in physical activities for at least 150 minutes per week.  Eat about 10 servings of fruits and vegetables daily. My advice is 2 servings of fruits and 8 servings of vegetables.  For vegetables choose at least half of them green and at least half of them fresh.  Please avoid sugar, salt, fried food and saturated fat.      F/up in 1 month or sooner if needed.

## 2023-10-21 PROBLEM — B00.1 COLD SORE: Status: ACTIVE | Noted: 2023-10-21

## 2023-10-21 PROBLEM — E66.3 OVERWEIGHT WITH BODY MASS INDEX (BMI) OF 29 TO 29.9 IN ADULT: Status: ACTIVE | Noted: 2023-10-21

## 2023-10-21 ASSESSMENT — ENCOUNTER SYMPTOMS: ARTHRALGIAS: 1

## 2023-10-21 NOTE — ASSESSMENT & PLAN NOTE
Please maintain enough calcium in your diet:  6577-1350 mg daily (consume foods rich in calcium rather than taking only calcium supplement to meet daily calcium requirements), take daily Vitamin D supplement with meal. Average Vit D dose is 2000 international units daily.  Weight bearing exercise routine is recommended.

## 2023-10-23 ENCOUNTER — APPOINTMENT (OUTPATIENT)
Dept: PRIMARY CARE | Facility: CLINIC | Age: 73
End: 2023-10-23
Payer: MEDICARE

## 2023-10-23 DIAGNOSIS — N95.1 HOT FLASHES DUE TO MENOPAUSE: ICD-10-CM

## 2023-10-23 RX ORDER — NORETHINDRONE 5 MG/1
5 TABLET ORAL DAILY
Qty: 90 TABLET | Refills: 2 | Status: SHIPPED | OUTPATIENT
Start: 2023-10-23

## 2023-10-23 NOTE — TELEPHONE ENCOUNTER
Order for 90d supply with 2 refills placed in system for dr alfaro to sign off on.    Pt notified.

## 2023-10-27 DIAGNOSIS — E03.9 HYPOTHYROIDISM, UNSPECIFIED: ICD-10-CM

## 2023-10-27 RX ORDER — LIOTHYRONINE SODIUM 5 UG/1
10 TABLET ORAL DAILY
Qty: 180 TABLET | Refills: 3 | Status: SHIPPED | OUTPATIENT
Start: 2023-10-27

## 2023-11-03 ENCOUNTER — OFFICE VISIT (OUTPATIENT)
Dept: VASCULAR SURGERY | Facility: CLINIC | Age: 73
End: 2023-11-03
Payer: MEDICARE

## 2023-11-03 VITALS
HEART RATE: 60 BPM | HEIGHT: 63 IN | WEIGHT: 158 LBS | BODY MASS INDEX: 28 KG/M2 | SYSTOLIC BLOOD PRESSURE: 134 MMHG | DIASTOLIC BLOOD PRESSURE: 80 MMHG

## 2023-11-03 DIAGNOSIS — I83.811 VARICOSE VEINS OF LOWER EXTREMITY WITH PAIN, RIGHT: Primary | ICD-10-CM

## 2023-11-03 PROCEDURE — 1036F TOBACCO NON-USER: CPT | Performed by: SURGERY

## 2023-11-03 PROCEDURE — 3008F BODY MASS INDEX DOCD: CPT | Performed by: SURGERY

## 2023-11-03 PROCEDURE — 1159F MED LIST DOCD IN RCRD: CPT | Performed by: SURGERY

## 2023-11-03 PROCEDURE — 3075F SYST BP GE 130 - 139MM HG: CPT | Performed by: SURGERY

## 2023-11-03 PROCEDURE — 1160F RVW MEDS BY RX/DR IN RCRD: CPT | Performed by: SURGERY

## 2023-11-03 PROCEDURE — 1126F AMNT PAIN NOTED NONE PRSNT: CPT | Performed by: SURGERY

## 2023-11-03 PROCEDURE — 99213 OFFICE O/P EST LOW 20 MIN: CPT | Performed by: SURGERY

## 2023-11-03 PROCEDURE — 3079F DIAST BP 80-89 MM HG: CPT | Performed by: SURGERY

## 2023-11-03 ASSESSMENT — ENCOUNTER SYMPTOMS
SPEECH DIFFICULTY: 0
EYES NEGATIVE: 1
NUMBNESS: 0
BRUISES/BLEEDS EASILY: 0
HEADACHES: 0
BACK PAIN: 0
DIZZINESS: 0
PSYCHIATRIC NEGATIVE: 1
ENDOCRINE NEGATIVE: 1
CONSTITUTIONAL NEGATIVE: 1
COUGH: 0
GASTROINTESTINAL NEGATIVE: 1
COLOR CHANGE: 0
WEAKNESS: 0
SHORTNESS OF BREATH: 0
WOUND: 0

## 2023-11-03 NOTE — PROGRESS NOTES
History Of Present Illness  Mara Montoya is a 73 y.o. female presenting for follow-up of symptomatic varicose veins of the right leg.  She states she has noticed minimal improvement since wearing compression stockings.  She still has difficulty standing and walking due to the discomfort in her leg.  She also notes pain in the right leg at nighttime when she keeps her leg bent, but is relieved with keeping her leg straight.  Previous venous reflux study revealed only 2 focal areas of reflux within the greater saphenous vein on the right.  However her pain appears to be more in the distal lateral calf.  She denies any swelling.  She does also give history of lumbar back issues with pain that has gone down into the hip and thigh before.  However she does not think her leg pain is related to this.     Past Medical History  She has a past medical history of Asymptomatic menopausal state (2018), Asymptomatic menopausal state (2018), Pain in right foot (2016), Personal history of other endocrine, nutritional and metabolic disease, Superficial foreign body of unspecified parts of thorax, initial encounter (2015), Superficial foreign body of unspecified parts of thorax, initial encounter (2015), and 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    Surgical History  She has a past surgical history that includes  section, classic (10/28/2013) and Knee surgery (10/28/2013).     Social History  She reports that she has never smoked. She has never been exposed to tobacco smoke. She has never used smokeless tobacco. No history on file for alcohol use and drug use.    Family History  Family History   Problem Relation Name Age of Onset    Hypertension Mother      Stroke Mother      Hypertension Father      Crohn's disease Brother      Macular degeneration Mother's Sister          Allergies  Patient has no known allergies.    Review of Systems   Constitutional:  Negative.    HENT: Negative.     Eyes: Negative.    Respiratory:  Negative for cough and shortness of breath.    Cardiovascular:  Negative for chest pain and leg swelling.   Gastrointestinal: Negative.    Endocrine: Negative.    Genitourinary: Negative.    Musculoskeletal:  Negative for back pain and gait problem.        Right leg pain   Skin:  Negative for color change, pallor and wound.   Neurological:  Negative for dizziness, syncope, speech difficulty, weakness, numbness and headaches.   Hematological:  Does not bruise/bleed easily.   Psychiatric/Behavioral: Negative.          Physical Exam  Constitutional:       General: She is not in acute distress.     Appearance: Normal appearance. She is normal weight.   HENT:      Head: Normocephalic and atraumatic.   Eyes:      Extraocular Movements: Extraocular movements intact.      Conjunctiva/sclera: Conjunctivae normal.      Pupils: Pupils are equal, round, and reactive to light.   Neck:      Vascular: No carotid bruit.   Cardiovascular:      Rate and Rhythm: Normal rate and regular rhythm.      Pulses: Normal pulses.      Heart sounds: Normal heart sounds.      Comments: Prominent varicosity running from right mid anterior thigh to mid distal calf  Pulmonary:      Effort: Pulmonary effort is normal.      Breath sounds: Normal breath sounds.   Abdominal:      General: Abdomen is flat. Bowel sounds are normal.      Palpations: Abdomen is soft.   Musculoskeletal:         General: No swelling. Normal range of motion.      Cervical back: Normal range of motion. No tenderness.   Skin:     General: Skin is warm and dry.   Neurological:      General: No focal deficit present.      Mental Status: She is alert and oriented to person, place, and time.      Cranial Nerves: No cranial nerve deficit.      Sensory: No sensory deficit.      Motor: No weakness.   Psychiatric:         Mood and Affect: Mood normal.         Behavior: Behavior normal.          Last Recorded  "Vitals  Blood pressure 134/80, pulse 60, height 1.6 m (5' 3\"), weight 71.7 kg (158 lb).    Relevant Results        Current Outpatient Medications:     amLODIPine (Norvasc) 5 mg tablet, TAKE 1 TABLET BY MOUTH EVERY DAY, Disp: 90 tablet, Rfl: 3    estradiol (Estrace) 0.5 mg tablet, Take 1 tablet (0.5 mg) by mouth once daily., Disp: , Rfl:     fluticasone (Flonase) 50 mcg/actuation nasal spray, INHALE 1 SPRAY IN EACH NOSTRIL TWICE DAILY, Disp: 48 mL, Rfl: 2    levothyroxine (Synthroid, Levoxyl) 125 mcg tablet, TAKE 1 TABLET BY MOUTH EVERY DAY, Disp: 90 tablet, Rfl: 2    liothyronine (Cytomel) 5 mcg tablet, TAKE 2 TABLETS BY MOUTH EVERY DAY, Disp: 180 tablet, Rfl: 3    norethindrone (Aygestin) 5 mg tablet, Take 1 tablet (5 mg) by mouth once daily., Disp: 90 tablet, Rfl: 2    oxyCODONE-acetaminophen (Percocet) 5-325 mg tablet, TAKE 1 TABLET BY MOUTH EVERY 6 HOURS, AS NEEDED -PAIN - MOD (4-6) (PACU) WHEN ABLE TO TAKE ORAL, Disp: , Rfl:     semaglutide, weight loss, (Wegovy) 0.25 mg/0.5 mL pen injector, Inject 0.25 mg under the skin 1 (one) time per week for 4 doses., Disp: 2 mL, Rfl: 0    tretinoin (Retin-A) 0.1 % cream, Apply topically., Disp: , Rfl:     valACYclovir (Valtrex) 1 gram tablet, TAKE 2 TABLET BY MOUTH EVERY TWELVE HOURS AS NEEDED FOR 1 DAY, REPEAT IF NEEDED, Disp: 24 tablet, Rfl: 1     No results found.      Assessment/Plan   Diagnoses and all orders for this visit:  Varicose veins of lower extremity with pain, right      74yo female with varicose veins of the right leg. She does have right leg pain as well, which I think may be partially associated with her varicose veins. However I think her lumbar issues also play a factor. I have discussed possible treatment options with her, including stab phlebectomy vs sclerotherapy. Given the small size of the varicosity, I think sclerotherapy would likely be a better option. I have also discussed the possibility that treatment of her veins may not completely relieve " all of her leg pain if her lumbar issues are truly also contributing.  She expresses understanding. I have given her referral to [facility] for evaluation for sclerotherapy.       I spent 25 minutes in the professional and overall care of this patient.      Sofía Hoffman MD

## 2023-11-21 ENCOUNTER — APPOINTMENT (OUTPATIENT)
Dept: PRIMARY CARE | Facility: CLINIC | Age: 73
End: 2023-11-21
Payer: MEDICARE

## 2023-12-04 ENCOUNTER — OFFICE VISIT (OUTPATIENT)
Dept: PAIN MEDICINE | Facility: CLINIC | Age: 73
End: 2023-12-04
Payer: MEDICARE

## 2023-12-04 DIAGNOSIS — M54.16 LUMBAR RADICULOPATHY: Primary | ICD-10-CM

## 2023-12-04 PROCEDURE — 3008F BODY MASS INDEX DOCD: CPT | Performed by: PAIN MEDICINE

## 2023-12-04 PROCEDURE — 1160F RVW MEDS BY RX/DR IN RCRD: CPT | Performed by: PAIN MEDICINE

## 2023-12-04 PROCEDURE — 99214 OFFICE O/P EST MOD 30 MIN: CPT | Performed by: PAIN MEDICINE

## 2023-12-04 PROCEDURE — 1159F MED LIST DOCD IN RCRD: CPT | Performed by: PAIN MEDICINE

## 2023-12-04 PROCEDURE — 1036F TOBACCO NON-USER: CPT | Performed by: PAIN MEDICINE

## 2023-12-04 PROCEDURE — 1126F AMNT PAIN NOTED NONE PRSNT: CPT | Performed by: PAIN MEDICINE

## 2023-12-04 NOTE — PROGRESS NOTES
Subjective   Mara Montoya is a 73 y.o. female who presents for evaluation of her right leg pain.  The pain is located in the lumbar  area and radiates down he right leg all the way to her toes.  The pt had RFA last 2022 and  it helped the back  but did not relieve all the pain down to her legs/toes. Here to discuss other options.  Pain level is at an 8/10 today and this is her norm. A good day her pain is at 5/10- the pain is aggravated by activity  Physical therapy completed   Has been taking Tylenol and Naprosyn but despite taking the medication she still complaining of the pain  Past Medical History:   Diagnosis Date    Asymptomatic menopausal state 2018    Asymptomatic age-related postmenopausal state    Asymptomatic menopausal state 2018    Asymptomatic age-related postmenopausal state    Pain in right foot 2016    Pain of right heel    Personal history of other endocrine, nutritional and metabolic disease     History of hypothyroidism    Superficial foreign body of unspecified parts of thorax, initial encounter 2015    Superficial foreign body of trunk with infection, initial encounter    Superficial foreign body of unspecified parts of thorax, initial encounter 2015    Superficial foreign body of trunk with infection, initial encounter    Thyrotoxicosis with diffuse goiter without thyrotoxic crisis or storm     Graves disease     Past Surgical History:   Procedure Laterality Date     SECTION, CLASSIC  10/28/2013     Section    KNEE SURGERY  10/28/2013    Knee Surgery Right       I have reviewed the nurses notes and am aware of family/social history.     Review of Systems  All 13 systems were reviewed and are within normal levels except as noted below or per HPI. Positive and pertinent negative responses are noted below or in the HPI   Denied any fever or chills. No weight loss and no night sweats. No cough or sputum production. No diarrhea   No  constipation  No bladder and bowel incontinence and no other changes in bladder and bowel. No skin changes.   Denied opioids diversion and abuse and denies alcoholism. Denies overuse of  pain medications.   Objective   Physical Exam      Past medical history no interval changes has been noted    On physical examination    General   Alert, oriented x3 pleasant and cooperative. Does not look in any major distress.    HEENT  Pupils normal in size. Ears, nose, mouth, and throat appear to be in normal condition.  Head atraumatic      No signs of sedation or signs of withdrawal apparent.    Psychiatric   No signs of depression apparent.    Neuro   No focal neurological deficit apparent. Ambulation at baseline.      Respiratory  No respiratory distress     Abdomen  no distention     Skin  No skin markings supportive of recent IV drug usage .    Cardiovascular  Regular rate and rhythm     X-ray of the lumbar spine area dated on May 2, 2023  IMPRESSION:     Levoscoliosis with multilevel degenerative change, moderate in the lower   thoracic spine.     Moderate facet arthropathy L4-L5 with minimal anterolisthesis   ASSESSMENT:   73 years old with history and physical examination supportive of lumbar radiculopathy tried and failed conservative management including physical therapy and nonsteroidal anti-inflammatory  PLAN:   Discussed with the patient the different modalities available for the treatment of her condition I would recommend for a trial of a lumbar epidural steroid injection targeting the L4-L5 or the L5-S1 level with injection of contrast material to confirm needle placement I will reevaluate the patient after the performance of the epidural for the need of diagnostic medial nerve branch block and repeating the radiofrequency ablation on as-needed basis patient verbalized understanding and agreement to the plan benefits and risk were discussed and she would like to proceed      The above clinical summary has been  dictated with voice recognition software. It has not been proofread for grammatical errors, typographical mistakes, or other semantic inconsistencies.    Thank you for visiting our office today. It was our pleasure to take part in your healthcare.     Please do not hesitate to contact the pain clinic after your visit with any questions or concerns at  M-F 8-4 pm       Gilberto Bradley M.D.  Medical Director , Division of Pain Medicine [facility]   of Anesthesiology and Pain Medicine  [facility] [employer]     [address]  [address] [place] [address]     Office: [phone]  Fax: [phone]

## 2023-12-11 ENCOUNTER — PHARMACY VISIT (OUTPATIENT)
Dept: PHARMACY | Facility: CLINIC | Age: 73
End: 2023-12-11

## 2023-12-11 PROCEDURE — RXMED WILLOW AMBULATORY MEDICATION CHARGE

## 2023-12-13 DIAGNOSIS — E66.3 OVERWEIGHT WITH BODY MASS INDEX (BMI) OF 29 TO 29.9 IN ADULT: Primary | ICD-10-CM

## 2023-12-13 RX ORDER — SEMAGLUTIDE 0.5 MG/.5ML
0.5 INJECTION, SOLUTION SUBCUTANEOUS
Qty: 2 ML | Refills: 0 | Status: SHIPPED | OUTPATIENT
Start: 2023-12-13 | End: 2024-01-12 | Stop reason: ALTCHOICE

## 2024-01-12 ENCOUNTER — PHARMACY VISIT (OUTPATIENT)
Dept: PHARMACY | Facility: CLINIC | Age: 74
End: 2024-01-12

## 2024-01-12 ENCOUNTER — OFFICE VISIT (OUTPATIENT)
Dept: PRIMARY CARE | Facility: CLINIC | Age: 74
End: 2024-01-12
Payer: MEDICARE

## 2024-01-12 VITALS
SYSTOLIC BLOOD PRESSURE: 132 MMHG | WEIGHT: 154.2 LBS | BODY MASS INDEX: 27.32 KG/M2 | TEMPERATURE: 97.5 F | OXYGEN SATURATION: 98 % | DIASTOLIC BLOOD PRESSURE: 74 MMHG | HEART RATE: 80 BPM | RESPIRATION RATE: 14 BRPM | HEIGHT: 63 IN

## 2024-01-12 DIAGNOSIS — Z12.31 ENCOUNTER FOR SCREENING MAMMOGRAM FOR BREAST CANCER: ICD-10-CM

## 2024-01-12 DIAGNOSIS — E11.9 TYPE 2 DIABETES MELLITUS WITHOUT COMPLICATION, WITHOUT LONG-TERM CURRENT USE OF INSULIN (MULTI): ICD-10-CM

## 2024-01-12 DIAGNOSIS — D75.1 ERYTHROCYTOSIS: ICD-10-CM

## 2024-01-12 DIAGNOSIS — I10 PRIMARY HYPERTENSION: ICD-10-CM

## 2024-01-12 DIAGNOSIS — E78.2 MIXED HYPERLIPIDEMIA: ICD-10-CM

## 2024-01-12 DIAGNOSIS — H91.93 HEARING DIFFICULTY OF BOTH EARS: ICD-10-CM

## 2024-01-12 DIAGNOSIS — M85.89 OSTEOPENIA OF MULTIPLE SITES: ICD-10-CM

## 2024-01-12 DIAGNOSIS — Z00.00 ROUTINE GENERAL MEDICAL EXAMINATION AT HEALTH CARE FACILITY: Primary | ICD-10-CM

## 2024-01-12 DIAGNOSIS — E03.8 OTHER SPECIFIED HYPOTHYROIDISM: ICD-10-CM

## 2024-01-12 DIAGNOSIS — E55.9 VITAMIN D DEFICIENCY: ICD-10-CM

## 2024-01-12 DIAGNOSIS — E66.3 OVERWEIGHT WITH BODY MASS INDEX (BMI) OF 29 TO 29.9 IN ADULT: ICD-10-CM

## 2024-01-12 PROCEDURE — 99214 OFFICE O/P EST MOD 30 MIN: CPT | Performed by: INTERNAL MEDICINE

## 2024-01-12 PROCEDURE — 3075F SYST BP GE 130 - 139MM HG: CPT | Performed by: INTERNAL MEDICINE

## 2024-01-12 PROCEDURE — 3008F BODY MASS INDEX DOCD: CPT | Performed by: INTERNAL MEDICINE

## 2024-01-12 PROCEDURE — 3078F DIAST BP <80 MM HG: CPT | Performed by: INTERNAL MEDICINE

## 2024-01-12 PROCEDURE — G0439 PPPS, SUBSEQ VISIT: HCPCS | Performed by: INTERNAL MEDICINE

## 2024-01-12 PROCEDURE — 1170F FXNL STATUS ASSESSED: CPT | Performed by: INTERNAL MEDICINE

## 2024-01-12 PROCEDURE — RXMED WILLOW AMBULATORY MEDICATION CHARGE

## 2024-01-12 PROCEDURE — 99397 PER PM REEVAL EST PAT 65+ YR: CPT | Performed by: INTERNAL MEDICINE

## 2024-01-12 PROCEDURE — 1159F MED LIST DOCD IN RCRD: CPT | Performed by: INTERNAL MEDICINE

## 2024-01-12 PROCEDURE — 1036F TOBACCO NON-USER: CPT | Performed by: INTERNAL MEDICINE

## 2024-01-12 PROCEDURE — 1126F AMNT PAIN NOTED NONE PRSNT: CPT | Performed by: INTERNAL MEDICINE

## 2024-01-12 RX ORDER — SEMAGLUTIDE 1 MG/.5ML
1 INJECTION, SOLUTION SUBCUTANEOUS
Qty: 2 ML | Refills: 0 | Status: SHIPPED | OUTPATIENT
Start: 2024-01-12 | End: 2024-03-18 | Stop reason: ALTCHOICE

## 2024-01-12 ASSESSMENT — ENCOUNTER SYMPTOMS
SINUS PRESSURE: 0
ACTIVITY CHANGE: 0
NECK PAIN: 0
CHEST TIGHTNESS: 0
ARTHRALGIAS: 1
LOSS OF SENSATION IN FEET: 0
DYSURIA: 0
STRIDOR: 0
HEADACHES: 0
HEMATOLOGIC/LYMPHATIC NEGATIVE: 1
TROUBLE SWALLOWING: 0
APPETITE CHANGE: 0
EYES NEGATIVE: 1
SINUS PAIN: 0
CONFUSION: 0
EYE PAIN: 0
CARDIOVASCULAR NEGATIVE: 1
ABDOMINAL PAIN: 0
PSYCHIATRIC NEGATIVE: 1
LIGHT-HEADEDNESS: 0
WOUND: 0
OCCASIONAL FEELINGS OF UNSTEADINESS: 0
VOICE CHANGE: 0
DIFFICULTY URINATING: 0
NECK STIFFNESS: 0
COUGH: 0
ADENOPATHY: 0
SEIZURES: 0
BACK PAIN: 1
EYE DISCHARGE: 0
RESPIRATORY NEGATIVE: 1
SLEEP DISTURBANCE: 0
POLYPHAGIA: 0
SHORTNESS OF BREATH: 0
CONSTITUTIONAL NEGATIVE: 1
COLOR CHANGE: 0
SPEECH DIFFICULTY: 0
GASTROINTESTINAL NEGATIVE: 1
CONSTIPATION: 0
DEPRESSION: 0
POLYDIPSIA: 0
DIARRHEA: 0
ALLERGIC/IMMUNOLOGIC NEGATIVE: 1
BRUISES/BLEEDS EASILY: 0
HALLUCINATIONS: 0
FLANK PAIN: 0
AGITATION: 0
MYALGIAS: 0
WEAKNESS: 0
PALPITATIONS: 0
UNEXPECTED WEIGHT CHANGE: 0
TREMORS: 0
JOINT SWELLING: 0
NAUSEA: 0
NERVOUS/ANXIOUS: 0
VOMITING: 0
FACIAL ASYMMETRY: 0
FREQUENCY: 0
SORE THROAT: 0
WHEEZING: 0
ENDOCRINE NEGATIVE: 1
DIZZINESS: 0
NUMBNESS: 0
EYE REDNESS: 0
BLOOD IN STOOL: 0

## 2024-01-12 ASSESSMENT — ACTIVITIES OF DAILY LIVING (ADL)
DRESSING: INDEPENDENT
MANAGING_FINANCES: INDEPENDENT
DOING_HOUSEWORK: INDEPENDENT
BATHING: INDEPENDENT
GROCERY_SHOPPING: INDEPENDENT
TAKING_MEDICATION: INDEPENDENT

## 2024-01-12 ASSESSMENT — PATIENT HEALTH QUESTIONNAIRE - PHQ9
SUM OF ALL RESPONSES TO PHQ9 QUESTIONS 1 AND 2: 0
2. FEELING DOWN, DEPRESSED OR HOPELESS: NOT AT ALL
1. LITTLE INTEREST OR PLEASURE IN DOING THINGS: NOT AT ALL

## 2024-01-12 NOTE — PROGRESS NOTES
Subjective   Reason for Visit: Mara Montoya is an 74 y.o. female here for a Medicare Wellness visit.     Past Medical, Surgical, and Family History reviewed and updated in chart.         [facility]    Patient Care Team:  Liza Hebert MD PhD as PCP - General  Liza Hebert MD PhD as PCP - [employer] PCP       Patient comes for MW, CPE and f/up visit.   Patient has been feeling pretty good and has been complaint with prescribed medications.    We reviewed and discussed details of recent blood work: CBC, CMP, TSH, Lipid panel, Hb A1c, Vit D done in  2023.  Results within acceptable range except mildly elevated cholesterol and low potassium.    Last mammogram: 5/30/23  DEXA: 11/2022: osteopenia  Colonoscopy: 2016, next 2026  Pneumonia vacc: 2020  Eye exam: yearly  Adv. Dir: not on file    Patient started Wegovy for weight loss. Has been tolerating it well, will need new dose 1 mg weekly.  She has been having difficulties getting medication from pharmacy (self pay).  Would like to consider switching to Zepbound.  Will refer to [facility] pharmacist to assist with possible transition.     Saw ortho at [facility] for second opinion regarding right lower leg pain.  Completed PT as recommended by ortho, she continues exercises advised by PT.      Patient used to see pain management Dr. Gardiner for lower back pain. Went back to see him in Dec 2023, discussed different modalities for treatment of her condition.  I reviewed consultation note.  Saw vascular surgeon Dr. Hoffman, advised compression stockings.   May consider sclerotherapy.     Patient is aware that losing weight may help to manage her medical conditions.        Review of Systems   Constitutional: Negative.  Negative for activity change, appetite change and unexpected weight change.   HENT:  Positive for hearing loss. Negative for congestion, ear discharge, ear pain, mouth sores, nosebleeds, sinus pressure, sinus pain, sore throat, trouble swallowing  "and voice change.    Eyes: Negative.  Negative for pain, discharge, redness and visual disturbance.   Respiratory: Negative.  Negative for cough, chest tightness, shortness of breath, wheezing and stridor.    Cardiovascular: Negative.  Negative for chest pain, palpitations and leg swelling.   Gastrointestinal: Negative.  Negative for abdominal pain, blood in stool, constipation, diarrhea, nausea and vomiting.   Endocrine: Negative.  Negative for polydipsia, polyphagia and polyuria.   Genitourinary: Negative.  Negative for difficulty urinating, dysuria, flank pain, frequency and urgency.   Musculoskeletal:  Positive for arthralgias, back pain and gait problem. Negative for joint swelling, myalgias, neck pain and neck stiffness.   Skin: Negative.  Negative for color change, rash and wound.   Allergic/Immunologic: Negative.  Negative for environmental allergies, food allergies and immunocompromised state.   Neurological:  Negative for dizziness, tremors, seizures, syncope, facial asymmetry, speech difficulty, weakness, light-headedness, numbness and headaches.   Hematological: Negative.  Negative for adenopathy. Does not bruise/bleed easily.   Psychiatric/Behavioral: Negative.  Negative for agitation, behavioral problems, confusion, hallucinations, sleep disturbance and suicidal ideas. The patient is not nervous/anxious.    All other systems reviewed and are negative.      Objective   Vitals:  /74   Pulse 80   Temp 36.4 °C (97.5 °F)   Resp 14   Ht 1.6 m (5' 3\")   Wt 69.9 kg (154 lb 3.2 oz)   SpO2 98%   BMI 27.32 kg/m²       Physical Exam  Vitals and nursing note reviewed.   Constitutional:       General: She is not in acute distress.     Appearance: Normal appearance.   HENT:      Head: Normocephalic and atraumatic.      Right Ear: Tympanic membrane, ear canal and external ear normal.      Left Ear: Tympanic membrane, ear canal and external ear normal.      Nose: Nose normal. No congestion or rhinorrhea. "      Mouth/Throat:      Mouth: Mucous membranes are moist.      Pharynx: Oropharynx is clear.   Eyes:      General:         Right eye: No discharge.         Left eye: No discharge.      Extraocular Movements: Extraocular movements intact.      Conjunctiva/sclera: Conjunctivae normal.      Pupils: Pupils are equal, round, and reactive to light.   Cardiovascular:      Rate and Rhythm: Normal rate and regular rhythm.      Pulses: Normal pulses.      Heart sounds: Normal heart sounds. No murmur heard.     No friction rub. No gallop.   Pulmonary:      Effort: Pulmonary effort is normal. No respiratory distress.      Breath sounds: Normal breath sounds. No stridor. No wheezing, rhonchi or rales.   Chest:      Chest wall: No tenderness.   Abdominal:      General: Bowel sounds are normal.      Palpations: Abdomen is soft. There is no mass.      Tenderness: There is no abdominal tenderness. There is no guarding or rebound.   Musculoskeletal:         General: No swelling or deformity. Normal range of motion.      Cervical back: Normal range of motion and neck supple. No rigidity or tenderness.      Right lower leg: No edema.      Left lower leg: No edema.   Lymphadenopathy:      Cervical: No cervical adenopathy.   Skin:     General: Skin is warm and dry.      Coloration: Skin is not jaundiced.      Findings: No bruising or erythema.   Neurological:      General: No focal deficit present.      Mental Status: She is alert and oriented to person, place, and time. Mental status is at baseline.      Cranial Nerves: No cranial nerve deficit.      Motor: No weakness.      Coordination: Coordination normal.      Gait: Gait normal.   Psychiatric:         Mood and Affect: Mood normal.         Behavior: Behavior normal.         Thought Content: Thought content normal.         Judgment: Judgment normal.         Assessment/Plan   Problem List Items Addressed This Visit          Cardiac and Vasculature    Mixed hyperlipidemia    Current  Assessment & Plan     Low cholesterol and low carbohydrate diet is advised.          Relevant Orders    Comprehensive Metabolic Panel (Completed)    Lipid Panel (Completed)    Primary hypertension    Current Assessment & Plan     Controlled. Continue current treatment.          Relevant Orders    Follow Up In [facility] - Pharmacy       ENT    Hearing difficulty       Endocrine/Metabolic    Other specified hypothyroidism    Relevant Orders    TSH with reflex to Free T4 if abnormal (Completed)    Vitamin D deficiency    Current Assessment & Plan     Continue Vit D supplement.          Overweight with body mass index (BMI) of 29 to 29.9 in adult    Current Assessment & Plan     Continue semaglutide, consider switching to tirzepatide. Consult [facility] pharmacist.          Relevant Orders    Follow Up In [facility] - Pharmacy       Hematology and Neoplasia    Erythrocytosis    Relevant Orders    CBC (Completed)       Musculoskeletal and Injuries    Osteopenia    Current Assessment & Plan     Please maintain enough calcium in your diet:  8469-7255 mg daily (consume foods rich in calcium rather than taking only calcium supplement to meet daily calcium requirements), take daily Vitamin D supplement with meal. Average Vit D dose is 2000 international units daily.  Weight bearing exercise routine is recommended.          Relevant Orders    Vitamin D 25-Hydroxy,Total (for eval of Vitamin D levels) (Completed)     Other Visit Diagnoses       Routine general medical examination at health care facility    -  Primary    Encounter for screening mammogram for breast cancer        Relevant Orders    BI mammo bilateral screening tomosynthesis (Completed)          It was a pleasure to see you today.  I would like to remind you about importance of a healthy lifestyle in order to improve your well-being and live longer.  Try to engage in physical activities for at least 150 minutes per week.  Eat about 10 servings of  fruits and vegetables daily. My advice is 2 servings of fruits and 8 servings of vegetables.  For vegetables choose at least half of them green and at least half of them fresh.  Please avoid sugar, salt, fried food and saturated fat.    I spent a total of 30 minutes on the date of service for follow up visit, which included preparing to see the patient, face-to-face patient care, completing clinical documentation, obtaining and/or reviewing separately obtained history, performing a medically appropriate examination, counseling and educating the patient/family/caregiver, ordering medications, tests, or procedures, communicating with other health care providers (not separately reported), independently interpreting results (not separately reported), communicating results to the patient/family/caregiver, and care coordination (not separately reported).    Please bring copy of your Healthcare Living Will and POA for next visit as discussed.    F/up in 3 months or sooner if needed.    Addendum: DM diagnosis was removed from patient's diagnoses list, it appeared on the list my mistake.

## 2024-01-16 ENCOUNTER — HOSPITAL ENCOUNTER (OUTPATIENT)
Dept: RADIOLOGY | Facility: HOSPITAL | Age: 74
Discharge: HOME | End: 2024-01-16
Payer: MEDICARE

## 2024-01-16 ENCOUNTER — OFFICE VISIT (OUTPATIENT)
Dept: PAIN MEDICINE | Facility: CLINIC | Age: 74
End: 2024-01-16
Payer: MEDICARE

## 2024-01-16 ENCOUNTER — APPOINTMENT (OUTPATIENT)
Dept: PAIN MEDICINE | Facility: CLINIC | Age: 74
End: 2024-01-16
Payer: MEDICARE

## 2024-01-16 ENCOUNTER — HOSPITAL ENCOUNTER (OUTPATIENT)
Dept: PAIN MEDICINE | Facility: CLINIC | Age: 74
Discharge: HOME | End: 2024-01-16
Payer: MEDICARE

## 2024-01-16 VITALS
OXYGEN SATURATION: 98 % | SYSTOLIC BLOOD PRESSURE: 168 MMHG | TEMPERATURE: 98.4 F | DIASTOLIC BLOOD PRESSURE: 84 MMHG | HEART RATE: 82 BPM | RESPIRATION RATE: 20 BRPM

## 2024-01-16 DIAGNOSIS — M54.16 LUMBAR RADICULOPATHY: Primary | ICD-10-CM

## 2024-01-16 DIAGNOSIS — M54.16 LUMBAR RADICULOPATHY: ICD-10-CM

## 2024-01-16 PROCEDURE — 2550000001 HC RX 255 CONTRASTS: Performed by: PAIN MEDICINE

## 2024-01-16 PROCEDURE — 2500000004 HC RX 250 GENERAL PHARMACY W/ HCPCS (ALT 636 FOR OP/ED): Performed by: PAIN MEDICINE

## 2024-01-16 PROCEDURE — 76000 FLUOROSCOPY <1 HR PHYS/QHP: CPT | Mod: 59

## 2024-01-16 PROCEDURE — 62323 NJX INTERLAMINAR LMBR/SAC: CPT | Performed by: PAIN MEDICINE

## 2024-01-16 RX ORDER — BUPIVACAINE HYDROCHLORIDE 2.5 MG/ML
5 INJECTION, SOLUTION EPIDURAL; INFILTRATION; INTRACAUDAL ONCE
Status: COMPLETED | OUTPATIENT
Start: 2024-01-16 | End: 2024-01-16

## 2024-01-16 RX ORDER — METHYLPREDNISOLONE ACETATE 40 MG/ML
80 INJECTION, SUSPENSION INTRA-ARTICULAR; INTRALESIONAL; INTRAMUSCULAR; SOFT TISSUE ONCE
Status: COMPLETED | OUTPATIENT
Start: 2024-01-16 | End: 2024-01-16

## 2024-01-16 RX ADMIN — IOHEXOL 10 ML: 300 INJECTION, SOLUTION INTRAVENOUS at 13:43

## 2024-01-16 RX ADMIN — METHYLPREDNISOLONE ACETATE 80 MG: 40 INJECTION, SUSPENSION INTRALESIONAL; INTRAMUSCULAR; INTRASYNOVIAL; SOFT TISSUE at 13:43

## 2024-01-16 RX ADMIN — BUPIVACAINE HYDROCHLORIDE 12.5 MG: 2.5 INJECTION, SOLUTION EPIDURAL; INFILTRATION; INTRACAUDAL; PERINEURAL at 13:43

## 2024-01-16 ASSESSMENT — PAIN DESCRIPTION - DESCRIPTORS: DESCRIPTORS: ACHING;TINGLING

## 2024-01-16 ASSESSMENT — COLUMBIA-SUICIDE SEVERITY RATING SCALE - C-SSRS
1. IN THE PAST MONTH, HAVE YOU WISHED YOU WERE DEAD OR WISHED YOU COULD GO TO SLEEP AND NOT WAKE UP?: NO
6. HAVE YOU EVER DONE ANYTHING, STARTED TO DO ANYTHING, OR PREPARED TO DO ANYTHING TO END YOUR LIFE?: NO
2. HAVE YOU ACTUALLY HAD ANY THOUGHTS OF KILLING YOURSELF?: NO

## 2024-01-16 ASSESSMENT — PAIN SCALES - GENERAL: PAINLEVEL_OUTOF10: 7

## 2024-01-16 NOTE — DISCHARGE INSTRUCTIONS
Post-injection instructions:    Your pain may not be gone immediately after the procedure--it usually takes the steroid 3-5 days to start working.   It may take several weeks for the medicine to reach its' full effect.   Pay attention to how much pain relief (what percentage compared to before the procedure) you get and for how long it lasts.     Activity: Avoid strenuous activity for 24 hours. After that return to your normal activity level.     Bandages: Remove after 24 hours     Showering/Bathing: You may shower after bandage is removed     Follow up: CALL OFFICE IN 7 DAYS [phone] LEAVE MESSAGE ABOUT THE RELIEF THAT WAS OBTAINED      Call the doctor immediately: if you notice:     Excessive bleeding from procedure site (brisk bright red bleeding from the site or bleeding that soaks the bandages or does not stop)   Severe headache  Inability to walk, leg or arm weakness or numbness that is worse after the procedure   Uncontrolled pain   New urinary or fecal incontinence   Signs of infection: Fever above 101.5F, redness, swelling, pus or drainage from the site    Epidural Injection    Why is this procedure done?  With an epidural injection, the doctor injects drugs deep into the area around your spinal cord. This is different than epidural anesthesia that is used for surgery or when a woman has a baby. Your spine is a group of bones in your back that protect the nerves in your spinal cord. Problems with your spine can cause swollen nerves in the spinal cord. This swelling leads to pain and can limit movement. In an epidural injection, the doctor may give you a drug to help with swelling and pain.  You may have an epidural injection in different parts of your back, based on where your pain is. For pain in your head or arms, you may have a cervical epidural injection. If your pain is in your upper or middle back, you may get a thoracic epidural injection. For pain in your lower back or legs, you may get a  lumbar epidural injection.    What will the results be?  The treatment may:  Lower pain  Reduce swelling in the nerves  Improve movement  What happens before the procedure?  Your doctor will take your history. Talk to the doctor about:  All the drugs you are taking. Be sure to include all prescription and over-the-counter (OTC) drugs, and herbal supplements. Tell the doctor about any drug allergy. Bring a list of drugs you take with you.  If you have high blood sugar or diabetes. Your drugs may need to be changed.  Any bleeding problems. Be sure to tell your doctor if you are taking any drugs that may cause bleeding. Some of these are warfarin, rivaroxaban, apixaban, ticagrelor, clopidogrel, ketorolac, ibuprofen, naproxen, or aspirin. Certain vitamins and herbs, such as garlic and fish oil, may also add to the risk for bleeding. You may need to stop these drugs as well. Talk to your doctor about them.  Tell the doctor if you are pregnant.  You will not be allowed to drive right away after the procedure. Ask a family member or a friend to drive you home.  What happens during the procedure?  To help the doctor make sure the drugs are being injected in the right place, your doctor may do an x-ray of your spine. Other times your doctor may do a continuous x-ray during the procedure. This is a fluoroscopy. The doctor may also use a colored dye or a contrast dye to check where to inject the drug.  You may be given a drug to help you relax. You may be given a drug to make the area of the injection numb.  The doctor will clean the skin on your back or neck. This helps prevent infection.  The doctor will put a needle through the skin toward your spine. The drug will be injected into a space near the spine.  The needle will be taken out and a bandage will be placed over the injection site.  What happens after the procedure?  Staff will check on you to make sure you are doing well. They will tell you when you can go  home.  What care is needed at home?  Relax on the day of the injection.  Do not drive or run machines for at least 12 hours afterwards.  Apply ice to the injection site. Place an ice pack or a bag of frozen peas wrapped in a towel over the painful part. Never put ice right on the skin. Do not leave the ice on more than 10 to 15 minutes at a time.  It may take a few days before you will feel the effects of the injection.  What follow-up care is needed?  Your doctor may ask you to make visits to the office to check on your progress. Be sure to keep these visits. You may also need to see a physical therapist (PT). The PT will teach you exercises to help you get back your strength and motion. Ask your doctor when you can exercise.  What problems could happen?  Bleeding  Infection (rare)  Headache  Nerve injury  If you have diabetes, your blood sugar can go up after the injection. Check with your doctor if you need more treatment for this.  Last Reviewed Date

## 2024-01-16 NOTE — OP NOTE
Operation  Midline lumbar epidural steroid injection. Level performed L5-S1    Surgical indications  The patient is here today to receive an epidural steroid injection to assist with pain.    Operative report  The patient was taken to the procedure room, was placed into a prone positioning. The lumbar area was prepped and draped sterilely in the normal fashion. Under fluoroscopic guidance the epidural space was identified. The skin and subcutaneous tissue was anesthetized with 1% lidocaine. An 18-gauge Tuohy needle was introduced using the normal saline loss-of-resistance technique. Once the loss of resistance had been achieved, final positioning of the needle was confirmed with negative aspiration of heme and CSF, with the injection of contrast material showing spread in the epidural space, confirmed in AP and lateral view. Then the patient received 80 mg of Depo-Medrol diluted into normal saline preservative-free and 2 mL of 0.25% bupivacaine making total volume of 8 mL. The patient tolerated the procedure well, was taken to the recovery room, allowed to recover sufficient amount of time and then was discharged home in stable condition. The patient was advised to [facility]up with [facility] to reassess improvement on as-needed basis.    Thank you for allowing me to participate in the care of this patient.    Gilberto Bradley MD  Medical director of [facility]   1:45 PM  01/16/24

## 2024-01-17 ENCOUNTER — TELEMEDICINE (OUTPATIENT)
Dept: PHARMACY | Facility: HOSPITAL | Age: 74
End: 2024-01-17
Payer: MEDICARE

## 2024-01-17 DIAGNOSIS — E66.3 OVERWEIGHT WITH BODY MASS INDEX (BMI) OF 29 TO 29.9 IN ADULT: Primary | ICD-10-CM

## 2024-01-17 DIAGNOSIS — I10 PRIMARY HYPERTENSION: ICD-10-CM

## 2024-01-17 NOTE — ASSESSMENT & PLAN NOTE
Mara Montoya is currently on Wegovy for weight loss. Her BMI prior to starting was 29.63 kg/m2 on 10/19/23, her most recent BMI on 01/12/24 is 27.32 kg/m2. She has lost ~8 pounds since initiation. She reports tolerating the 0.5 mg dose well with no issues so far, does note increased satiety. She is interested in switching to Zepbound due to better availability currently.   Patient has 2 more weeks of 0.5 mg Wegovy at home and then a full 4 week pack of Wegovy 1 mg. She will continue with the 0.5 mg for 2 weeks then increase to the 1 mg once weekly for 4 weeks. After that we will discuss switching her to Zepbound.  CHANGE: Wegovy 1 mg under the skin once weekly (increased from 0.5 mg starting on 01/29/24 after finishing remaining 2 weeks of 0.5 mg dose)  We will follow-up in ~6 weeks prior to her last Wegovy dose to ensure she is tolerating it well and to discuss switching her over to Zepbound at that time.

## 2024-01-17 NOTE — PROGRESS NOTES
Patient is sent at the request of Liza Hebert M* for my opinion regarding weight management.  My final recommendations will be communicated back to the requesting provider by way of shared medical record.    Subjective     Mara Montoya is a 73 y.o. female who is overweight as evidenced by her most recent BMI of 27.32 kg/m2 as of 2024 complicated by hyperlipidemia and hypertension. She was started on Wegovy 0.25 mg once weekly by Dr. Mckeon on 10/19/23. At this time her BMI was 29.63 kg/m2. At last visit with Dr. Mckeon she sent in a prescription for Wegovy 1 mg however patient is still working on the supply of 0.5 mg at home. Dr. Mckeon referred the patient to the pharmacy team to discuss switching to Zepbound.     Currently using Wegovy 0.5 mg once weekly, prescription increased up to 1 mg at last appointment and patient picked this up however has not started it.     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    Past Surgical History:  She has a past surgical history that includes  section, classic (10/28/2013) and Knee surgery (10/28/2013).    Social History:  She reports that she has never smoked. She has never been exposed to tobacco smoke. She has never used smokeless tobacco. She reports current alcohol use of about 7.0 standard drinks of alcohol per week. She reports that she does not use drugs.    Family History:  Family History   Problem Relation Name Age of Onset    Hypertension Mother      Stroke Mother      Hypertension Father   "    Crohn's disease Brother      Macular degeneration Mother's Sister       Allergies:  Patient has no known allergies.    Current diet:   Breakfast: egg with toast, coffee  Lunch: tomato with a slice of bread   Dinner: salmon, sweet potatoes, green vegetables   Snacks: not really snacking, is does may have popcorn   Fluids: water, tea at night   Will also have oranges occasionally     Current exercise:   Has been working on increased activity  Yoga once a week  Pilates once a week   Is going to the [facility] for senior weight training classes 4 times per week     Previous Attempts at Weight Loss:   Was in a program with [facility] with  and son to eat a plant based diet, states this has led to some of their current dietary habits  Has tried intermittent fasting   Has also tried the Mediterranean diet    Rentho scale - states that this works with an azucena on her phone to calculate BMI and give dietary recommendations     The patient does not have a known family history of diabetes.    Adverse Effects: notes increased satiety with Wegovy 0.5 mg, no other side effects reported     Objective     Weight Management Pharmacotherapy:  Wegovy 0.5 mg under the skin once weekly     Pertinent PMH Review:  PMH of Pancreatitis: No  PMH of Retinopathy: No  PMH of MTC: No  PMH of MEN 2: No    Lab Review  Lab Results   Component Value Date    BILITOT 1.0 06/13/2023    CALCIUM 9.1 07/14/2023    CO2 29 07/14/2023     07/14/2023    CREATININE 0.72 07/14/2023    GLUCOSE 147 (H) 07/14/2023    ALKPHOS 34 06/13/2023    K 3.3 (L) 07/14/2023    PROT 6.8 06/13/2023     07/14/2023    AST 20 06/13/2023    ALT 15 06/13/2023    BUN 11 07/14/2023    ANIONGAP 9 (L) 07/14/2023    MG 1.70 05/18/2021    PHOS 2.6 05/18/2021    ALBUMIN 4.3 06/13/2023    GFRF 89 07/14/2023     Lab Results   Component Value Date    TRIG 105 06/13/2023    CHOL 225 (H) 06/13/2023    HDL 69.7 06/13/2023     No results found for: \"HGBA1C\"  No components found for: " "\"UACR\"  The 10-year ASCVD risk score (Josi TONY, et al., 2019) is: 43.2%    Values used to calculate the score:      Age: 73 years      Sex: Female      Is Non- : No      Diabetic: Yes      Tobacco smoker: No      Systolic Blood Pressure: 168 mmHg      Is BP treated: Yes      HDL Cholesterol: 69.7 mg/dL      Total Cholesterol: 225 mg/dL    Drug Interactions:  No significant drug-drug interactions exist that require an adjustment to medication therapy.    Wt Readings from Last 4 Encounters:   01/12/24 69.9 kg (154 lb 3.2 oz)   11/03/23 71.7 kg (158 lb)   10/19/23 73.5 kg (162 lb)   08/25/23 71.2 kg (157 lb)     Estimated body mass index is 27.32 kg/m² as calculated from the following:    Height as of 1/12/24: 1.6 m (5' 3\").    Weight as of 1/12/24: 69.9 kg (154 lb 3.2 oz).    % Weight Reduction Since Initiation of Wegovy: 4.9%    Patient Goals:   Weight reduction of > 5% after 12 weeks at highest tolerated dose       Assessment/Plan   Problem List Items Addressed This Visit       Overweight with body mass index (BMI) of 29 to 29.9 in adult - Primary     Mara Montoya is currently on Wegovy for weight loss. Her BMI prior to starting was 29.63 kg/m2 on 10/19/23, her most recent BMI on 01/12/24 is 27.32 kg/m2. She has lost ~8 pounds since initiation. She reports tolerating the 0.5 mg dose well with no issues so far, does note increased satiety. She is interested in switching to Zepbound due to better availability currently.   Patient has 2 more weeks of 0.5 mg Wegovy at home and then a full 4 week pack of Wegovy 1 mg. She will continue with the 0.5 mg for 2 weeks then increase to the 1 mg once weekly for 4 weeks. After that we will discuss switching her to Zepbound.  CHANGE: Wegovy 1 mg under the skin once weekly (increased from 0.5 mg starting on 01/29/24 after finishing remaining 2 weeks of 0.5 mg dose)  We will follow-up in ~6 weeks prior to her last Wegovy dose to ensure she is tolerating " it well and to discuss switching her over to Zepbound at that time.          Relevant Orders    Follow Up In [facility] - Pharmacy    Primary hypertension    Relevant Orders    Follow Up In [facility] - Pharmacy     Pharmacy Follow-Up: 02/23/24  PCP Follow-Up: 04/04/24    Iglesia Watson PharmD     Continue all meds under the continuation of care with the referring provider and clinical pharmacy team.

## 2024-02-17 ENCOUNTER — LAB (OUTPATIENT)
Dept: LAB | Facility: LAB | Age: 74
End: 2024-02-17
Payer: MEDICARE

## 2024-02-17 DIAGNOSIS — E78.2 MIXED HYPERLIPIDEMIA: ICD-10-CM

## 2024-02-17 DIAGNOSIS — E03.8 OTHER SPECIFIED HYPOTHYROIDISM: ICD-10-CM

## 2024-02-17 DIAGNOSIS — D75.1 ERYTHROCYTOSIS: ICD-10-CM

## 2024-02-17 DIAGNOSIS — M85.89 OSTEOPENIA OF MULTIPLE SITES: ICD-10-CM

## 2024-02-17 LAB
25(OH)D3 SERPL-MCNC: 48 NG/ML (ref 30–100)
ALBUMIN SERPL BCP-MCNC: 4.2 G/DL (ref 3.4–5)
ALP SERPL-CCNC: 31 U/L (ref 33–136)
ALT SERPL W P-5'-P-CCNC: 15 U/L (ref 7–45)
ANION GAP SERPL CALC-SCNC: 11 MMOL/L (ref 10–20)
AST SERPL W P-5'-P-CCNC: 15 U/L (ref 9–39)
BILIRUB SERPL-MCNC: 0.8 MG/DL (ref 0–1.2)
BUN SERPL-MCNC: 12 MG/DL (ref 6–23)
CALCIUM SERPL-MCNC: 9.6 MG/DL (ref 8.6–10.3)
CHLORIDE SERPL-SCNC: 108 MMOL/L (ref 98–107)
CHOLEST SERPL-MCNC: 206 MG/DL (ref 0–199)
CHOLESTEROL/HDL RATIO: 3.7
CO2 SERPL-SCNC: 27 MMOL/L (ref 21–32)
CREAT SERPL-MCNC: 0.75 MG/DL (ref 0.5–1.05)
EGFRCR SERPLBLD CKD-EPI 2021: 85 ML/MIN/1.73M*2
ERYTHROCYTE [DISTWIDTH] IN BLOOD BY AUTOMATED COUNT: 13.1 % (ref 11.5–14.5)
GLUCOSE SERPL-MCNC: 87 MG/DL (ref 74–99)
HCT VFR BLD AUTO: 47.1 % (ref 36–46)
HDLC SERPL-MCNC: 56 MG/DL
HGB BLD-MCNC: 16.1 G/DL (ref 12–16)
LDLC SERPL CALC-MCNC: 133 MG/DL
MCH RBC QN AUTO: 31.9 PG (ref 26–34)
MCHC RBC AUTO-ENTMCNC: 34.2 G/DL (ref 32–36)
MCV RBC AUTO: 94 FL (ref 80–100)
NON HDL CHOLESTEROL: 150 MG/DL (ref 0–149)
NRBC BLD-RTO: 0 /100 WBCS (ref 0–0)
PLATELET # BLD AUTO: 231 X10*3/UL (ref 150–450)
POTASSIUM SERPL-SCNC: 4 MMOL/L (ref 3.5–5.3)
PROT SERPL-MCNC: 6.1 G/DL (ref 6.4–8.2)
RBC # BLD AUTO: 5.04 X10*6/UL (ref 4–5.2)
SODIUM SERPL-SCNC: 142 MMOL/L (ref 136–145)
TRIGL SERPL-MCNC: 84 MG/DL (ref 0–149)
TSH SERPL-ACNC: 2.31 MIU/L (ref 0.44–3.98)
VLDL: 17 MG/DL (ref 0–40)
WBC # BLD AUTO: 4 X10*3/UL (ref 4.4–11.3)

## 2024-02-17 PROCEDURE — 36415 COLL VENOUS BLD VENIPUNCTURE: CPT

## 2024-02-17 PROCEDURE — 84443 ASSAY THYROID STIM HORMONE: CPT

## 2024-02-17 PROCEDURE — 85027 COMPLETE CBC AUTOMATED: CPT

## 2024-02-17 PROCEDURE — 82306 VITAMIN D 25 HYDROXY: CPT

## 2024-02-17 PROCEDURE — 80053 COMPREHEN METABOLIC PANEL: CPT

## 2024-02-17 PROCEDURE — 80061 LIPID PANEL: CPT

## 2024-02-20 DIAGNOSIS — M47.816 LUMBAR SPONDYLOSIS: Primary | ICD-10-CM

## 2024-02-20 DIAGNOSIS — E03.9 HYPOTHYROIDISM, UNSPECIFIED: ICD-10-CM

## 2024-02-20 RX ORDER — LEVOTHYROXINE SODIUM 125 UG/1
TABLET ORAL
Qty: 90 TABLET | Refills: 2 | Status: SHIPPED | OUTPATIENT
Start: 2024-02-20

## 2024-02-21 ENCOUNTER — TELEMEDICINE (OUTPATIENT)
Dept: PHARMACY | Facility: HOSPITAL | Age: 74
End: 2024-02-21
Payer: MEDICARE

## 2024-02-21 DIAGNOSIS — R09.81 NASAL CONGESTION: ICD-10-CM

## 2024-02-21 DIAGNOSIS — I10 PRIMARY HYPERTENSION: ICD-10-CM

## 2024-02-21 DIAGNOSIS — E66.3 OVERWEIGHT WITH BODY MASS INDEX (BMI) OF 29 TO 29.9 IN ADULT: Primary | ICD-10-CM

## 2024-02-21 RX ORDER — FLUTICASONE PROPIONATE 50 MCG
SPRAY, SUSPENSION (ML) NASAL
Qty: 48 ML | Refills: 2 | Status: SHIPPED | OUTPATIENT
Start: 2024-02-21

## 2024-02-21 NOTE — PROGRESS NOTES
Patient is sent at the request of Liza Hebert M* for my opinion regarding weight management.  My final recommendations will be communicated back to the requesting provider by way of shared medical record.    Subjective     Mara Montoya is a 73 y.o. female who is overweight as evidenced by her most recent BMI of 27.32 kg/m2 as of 2024 complicated by hyperlipidemia and hypertension. She was started on Wegovy 0.25 mg once weekly by Dr. Mckeon on 10/19/23. At this time her BMI was 29.63 kg/m2. At last pharmacy visit patient had just picked up her prescription for Wegovy 1 mg once weekly. We are meeting today to discuss switching to Zepbound now that she has finished Wegovy.     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    Past Surgical History:  She has a past surgical history that includes  section, classic (10/28/2013) and Knee surgery (10/28/2013).    Social History:  She reports that she has never smoked. She has never been exposed to tobacco smoke. She has never used smokeless tobacco. She reports current alcohol use of about 7.0 standard drinks of alcohol per week. She reports that she does not use drugs.    Family History:  Family History   Problem Relation Name Age of Onset    Hypertension Mother      Stroke Mother      Hypertension Father      Crohn's disease Brother      Macular degeneration Mother's Sister       Allergies:  Patient has no known allergies.    Current diet:   Breakfast: egg with toast, coffee  Lunch: tomato with a  slice of bread   Dinner: salmon, sweet potatoes, green vegetables   Snacks: not really snacking, is does may have popcorn   Fluids: water, tea at night, has been working on increasing fluid intake   Will also have oranges occasionally     Current exercise:   Has been working on increasing activity   Is using physical therapy exercises at home   Yoga once a week  Pilates once a week   Is going to the [facility] for senior weight training classes 2 times per week     Previous Attempts at Weight Loss:   Was in a program with [facility] with  and son to eat a plant based diet, states this has led to some of their current dietary habits  Has tried intermittent fasting   Has also tried the Mediterranean diet    Rentho scale - states that this works with an azucena on her phone to calculate BMI and give dietary recommendations     The patient does not have a known family history of diabetes.    Adverse Effects: notes some indigestion when she over eats - has been taking tums when this happens which has relieved it; has been eating prunes and raisin bran to also increase fiber intake to help with constipation     Objective     Weight Management Pharmacotherapy:  Wegovy 1 mg under the skin once weekly     Pertinent PMH Review:  PMH of Pancreatitis: No  PMH of Retinopathy: No  PMH of MTC: No  PMH of MEN 2: No    Lab Review  Lab Results   Component Value Date    BILITOT 0.8 02/17/2024    CALCIUM 9.6 02/17/2024    CO2 27 02/17/2024     (H) 02/17/2024    CREATININE 0.75 02/17/2024    GLUCOSE 87 02/17/2024    ALKPHOS 31 (L) 02/17/2024    K 4.0 02/17/2024    PROT 6.1 (L) 02/17/2024     02/17/2024    AST 15 02/17/2024    ALT 15 02/17/2024    BUN 12 02/17/2024    ANIONGAP 11 02/17/2024    MG 1.70 05/18/2021    PHOS 2.6 05/18/2021    ALBUMIN 4.2 02/17/2024    GFRF 89 07/14/2023     Lab Results   Component Value Date    TRIG 84 02/17/2024    CHOL 206 (H) 02/17/2024    LDLCALC 133 (H) 02/17/2024    HDL 56.0 02/17/2024     No  "results found for: \"HGBA1C\"  No components found for: \"UACR\"  The 10-year ASCVD risk score (Josi TONY, et al., 2019) is: 43.3%    Values used to calculate the score:      Age: 73 years      Sex: Female      Is Non- : No      Diabetic: Yes      Tobacco smoker: No      Systolic Blood Pressure: 168 mmHg      Is BP treated: Yes      HDL Cholesterol: 56 mg/dL      Total Cholesterol: 206 mg/dL    Drug Interactions:  No significant drug-drug interactions exist that require an adjustment to medication therapy.    Wt Readings from Last 4 Encounters:   24 69.9 kg (154 lb 3.2 oz)   23 71.7 kg (158 lb)   10/19/23 73.5 kg (162 lb)   23 71.2 kg (157 lb)     Estimated body mass index is 27.32 kg/m² as calculated from the following:    Height as of 24: 1.6 m (5' 3\").    Weight as of 24: 69.9 kg (154 lb 3.2 oz).    Patient Reported Home Weights:   24: 146 lbs     % Weight Reduction Since Initiation of Wegovy: 4.9%      Patient Goals:   Weight reduction of > 5% after 12 weeks at highest tolerated dose     Completed education for the administration of once-weekly Zepbound:  Instructed patient that Zepbound must be kept refrigerated, and if necessary a pen may be stored unrefrigerated at temperatures not to exceed 30C (86F) for up to 21 days.   Remove the Pen from the refrigerator. Leave the base cap on until you are ready to inject.  Check the Pen label to make sure you have the right medicine and it has not . Additionally, ensure that the solution is not cloudy, discolored, or has particles in it.  Prior to administration, wash and rinse hands.   Next, choose your injection site (You may inject into your abdomen or thigh; another person may give you the injection in your upper arm).  Change (rotate) your injection site each week. You may use the same area of your body, but be sure to choose a different injection site in that area.  Once administration site has been " selected, use a new alcohol swab to sanitize the administration site and allow to air dry.  First, make sure the pen is in the locked position. While still in the locked position remove the base cap (gray cap) and dispose into a regular trash. Do not attempt to put the base cap back on, this may damage the needle.  Place the Clear Base flat and firmly against your skin at the injection site.   Press and hold the purple injection button. You will hear a loud click. Continue holding the Clear Base firmly against your skin until you hear a second click. Do not rub the area after administration.  Dispose of the pen in a sharps container (i.e. Coffee can, laundry detergent bottle)  Injections should be done on the same day every week, if you forget you have up to 4 days (96 hours) to remember to do your next dose.     Assessment/Plan   Problem List Items Addressed This Visit       Overweight with body mass index (BMI) of 29 to 29.9 in adult - Primary     Mara Montoya is currently on Wegovy for weight loss. Her BMI prior to starting was 29.63 kg/m2 on 10/19/23, her most recent BMI on 01/12/24 is 27.32 kg/m2. She has lost ~8 pounds since initiation. She has tolerated the increase to the 1 mg dose well with no issues reported. She is agreeable to transitioning to Zepbound for lower costs and easier administration due to the difference in injection mechanism.   START: Zepbound 5 mg under the skin once weekly   STOP: Wegovy 1 mg under the skin once weekly   Prescription sent to [facility] per patient request.   We will follow-up in ~1 month to discuss tolerability and increasing up to the next dose and to follow-up on weight. Once patient's BMI is <27 we will likely keep her on her current dose for maintenance instead of increasing the dose for more weight loss.          Relevant Medications    tirzepatide, weight loss, (Zepbound) 5 mg/0.5 mL injection    Other Relevant Orders    Follow Up In [facility] -  Pharmacy    Primary hypertension     Pharmacy Follow-Up: 03/18/24   PCP Follow-Up: 04/04/24    Iglesia Watson PharmD     Continue all meds under the continuation of care with the referring provider and clinical pharmacy team.

## 2024-02-21 NOTE — ASSESSMENT & PLAN NOTE
Mara Montoya is currently on Wegovy for weight loss. Her BMI prior to starting was 29.63 kg/m2 on 10/19/23, her most recent BMI on 01/12/24 is 27.32 kg/m2. She has lost ~8 pounds since initiation. She has tolerated the increase to the 1 mg dose well with no issues reported. She is agreeable to transitioning to Zepbound for lower costs and easier administration due to the difference in injection mechanism.   START: Zepbound 5 mg under the skin once weekly   STOP: Wegovy 1 mg under the skin once weekly   Prescription sent to [facility] per patient request.   We will follow-up in ~1 month to discuss tolerability and increasing up to the next dose and to follow-up on weight. Once patient's BMI is <27 we will likely keep her on her current dose for maintenance instead of increasing the dose for more weight loss.

## 2024-02-22 ENCOUNTER — APPOINTMENT (OUTPATIENT)
Dept: PHARMACY | Facility: HOSPITAL | Age: 74
End: 2024-02-22
Payer: MEDICARE

## 2024-02-23 ENCOUNTER — APPOINTMENT (OUTPATIENT)
Dept: PHARMACY | Facility: HOSPITAL | Age: 74
End: 2024-02-23
Payer: MEDICARE

## 2024-02-28 ENCOUNTER — TELEPHONE (OUTPATIENT)
Dept: PAIN MEDICINE | Facility: CLINIC | Age: 74
End: 2024-02-28
Payer: MEDICARE

## 2024-02-28 NOTE — TELEPHONE ENCOUNTER
Called patient regarding nerve block scheduled on 3/5 to see if she is on blood thinners. Left vm for patient to call us back to schedule.

## 2024-03-05 ENCOUNTER — HOSPITAL ENCOUNTER (OUTPATIENT)
Dept: RADIOLOGY | Facility: HOSPITAL | Age: 74
Discharge: HOME | End: 2024-03-05
Payer: MEDICARE

## 2024-03-05 ENCOUNTER — HOSPITAL ENCOUNTER (OUTPATIENT)
Dept: PAIN MEDICINE | Facility: CLINIC | Age: 74
Discharge: HOME | End: 2024-03-05
Payer: MEDICARE

## 2024-03-05 VITALS
SYSTOLIC BLOOD PRESSURE: 171 MMHG | HEART RATE: 77 BPM | OXYGEN SATURATION: 100 % | DIASTOLIC BLOOD PRESSURE: 113 MMHG | TEMPERATURE: 97.3 F | RESPIRATION RATE: 18 BRPM

## 2024-03-05 DIAGNOSIS — M47.816 LUMBAR SPONDYLOSIS: ICD-10-CM

## 2024-03-05 PROCEDURE — 64493 INJ PARAVERT F JNT L/S 1 LEV: CPT | Performed by: PAIN MEDICINE

## 2024-03-05 PROCEDURE — 64493 INJ PARAVERT F JNT L/S 1 LEV: CPT | Mod: 50 | Performed by: PAIN MEDICINE

## 2024-03-05 PROCEDURE — 2500000005 HC RX 250 GENERAL PHARMACY W/O HCPCS: Performed by: PAIN MEDICINE

## 2024-03-05 PROCEDURE — 64494 INJ PARAVERT F JNT L/S 2 LEV: CPT | Performed by: PAIN MEDICINE

## 2024-03-05 RX ORDER — LIDOCAINE HYDROCHLORIDE 10 MG/ML
10 INJECTION, SOLUTION EPIDURAL; INFILTRATION; INTRACAUDAL; PERINEURAL ONCE
Status: COMPLETED | OUTPATIENT
Start: 2024-03-05 | End: 2024-03-05

## 2024-03-05 RX ORDER — LIDOCAINE HYDROCHLORIDE 20 MG/ML
10 INJECTION, SOLUTION EPIDURAL; INFILTRATION; INTRACAUDAL; PERINEURAL ONCE
Status: COMPLETED | OUTPATIENT
Start: 2024-03-05 | End: 2024-03-05

## 2024-03-05 RX ADMIN — LIDOCAINE HYDROCHLORIDE 200 MG: 20 INJECTION, SOLUTION EPIDURAL; INFILTRATION; INTRACAUDAL; PERINEURAL at 09:00

## 2024-03-05 RX ADMIN — LIDOCAINE HYDROCHLORIDE 100 MG: 10 INJECTION, SOLUTION EPIDURAL; INFILTRATION; INTRACAUDAL; PERINEURAL at 09:00

## 2024-03-05 ASSESSMENT — PAIN SCALES - GENERAL: PAINLEVEL_OUTOF10: 7

## 2024-03-05 ASSESSMENT — PAIN DESCRIPTION - DESCRIPTORS: DESCRIPTORS: ACHING

## 2024-03-05 ASSESSMENT — PAIN - FUNCTIONAL ASSESSMENT: PAIN_FUNCTIONAL_ASSESSMENT: 0-10

## 2024-03-05 NOTE — DISCHARGE INSTRUCTIONS
Post-injection instructions FOR FACET BLOCK      Pay attention to how much pain relief (what percentage compared to before the procedure) you get and for how long it lasts.     THIS IS A TEMPORARY NUMBING OF PAIN THIS IS BEING USED AS A DIAGNOSTIC INDICATOR IF THIS IS THE SOURCE OF YOUR PAIN    Activity:  RETURN TO NORMAL ACTIVITY  SEE IF YOU CAN APPRECIATE AN IMPROVEMENT IN THE PAIN    Bandages: Remove after 24 hours     Showering/Bathing: You may shower after bandage is removed     Follow up: CALL OFFICE NEXT BUSINESS [phone] LEAVE MESSAGE ABOUT THE % OF RELIEF THAT WAS OBTAINED AND FOR HOW MANY HOURS      Call the OFFICE immediately: if you notice:     Excessive bleeding from procedure site (brisk bright red bleeding from the site or bleeding that soaks the bandages or does not stop)   Severe headache  Inability to walk, leg or arm weakness or numbness that is worse after the procedure   Uncontrolled pain   New urinary or fecal incontinence   Signs of infection: Fever above 101.5F, redness, swelling, pus or drainage from the site

## 2024-03-05 NOTE — OP NOTE
Clinical Note  The patient is here today to receive diagnostic medial nerve branch block bilateral L3-L4 L4-L5 to assist with pain.    Procedure Note  The patient was taken to the procedure room, was placed into a prone position. The area was prepped and draped sterilely in the normal fashion. The patient was throughout the procedure monitored by the nursing staff. The skin and subcutaneous tissue was anesthetized with 1% lidocaine. Then 22-gauge spinal needles were introduced into the approximation of the medial nerve branches at the above mentioned level, confirmed in AP and oblique view with negative aspiration of heme and CSF. The patient received 0.5 mL of 2% lidocaine per site. The patient tolerated the procedure well, was taken to the recovery room, allowed to recover for sufficient amount of time and then was discharged home in stable condition. The patient was advised to followup with the [facility] to reassess the improvement and determine the need for the radiofrequency ablation.    Thank you for allowing me to participate in the care of this patient.

## 2024-03-08 ENCOUNTER — TELEPHONE (OUTPATIENT)
Dept: PAIN MEDICINE | Facility: CLINIC | Age: 74
End: 2024-03-08
Payer: MEDICARE

## 2024-03-08 DIAGNOSIS — M47.816 LUMBAR SPONDYLOSIS: Primary | ICD-10-CM

## 2024-03-14 ENCOUNTER — APPOINTMENT (OUTPATIENT)
Dept: RADIOLOGY | Facility: HOSPITAL | Age: 74
End: 2024-03-14
Payer: MEDICARE

## 2024-03-18 ENCOUNTER — TELEMEDICINE (OUTPATIENT)
Dept: PHARMACY | Facility: HOSPITAL | Age: 74
End: 2024-03-18
Payer: MEDICARE

## 2024-03-18 DIAGNOSIS — E66.3 OVERWEIGHT WITH BODY MASS INDEX (BMI) OF 29 TO 29.9 IN ADULT: Primary | ICD-10-CM

## 2024-03-18 NOTE — PROGRESS NOTES
Patient is sent at the request of Liza Hebert M* for my opinion regarding weight management.  My final recommendations will be communicated back to the requesting provider by way of shared medical record.    Subjective     Mara Montoya is a 73 y.o. female who is overweight as evidenced by her most recent BMI of 27.32 kg/m2 as of 2024 complicated by hyperlipidemia and hypertension. She was started on Wegovy 0.25 mg once weekly by Dr. Mckeon on 10/19/23. At this time her BMI was 29.63 kg/m2. At last pharmacy visit patient's Wegovy was switched to Zepbound 5 mg once weekly as it is slightly cheaper for the patient. We are meeting today to discuss tolerability to the medication and increasing up to the 7.5 mg once weekly dose.     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    Past Surgical History:  She has a past surgical history that includes  section, classic (10/28/2013) and Knee surgery (10/28/2013).    Social History:  She reports that she has never smoked. She has never been exposed to tobacco smoke. She has never used smokeless tobacco. She reports current alcohol use of about 7.0 standard drinks of alcohol per week. She reports that she does not use drugs.    Family History:  Family History   Problem Relation Name Age of Onset    Hypertension Mother      Stroke Mother      Hypertension Father      Crohn's disease Brother      Macular degeneration Mother's Sister       Allergies:  Patient has no known allergies.    Current diet:    Breakfast: egg with toast, coffee  Lunch: tomato with a slice of bread   Dinner: salmon, sweet potatoes, green vegetables   Snacks: not really snacking, is does may have popcorn   Fluids: water, tea at night, has been working on increasing fluid intake   Will also have oranges occasionally     Current exercise:   Has been working on increasing activity   Is using physical therapy exercises at home   Yoga once a week  Pilates once a week   Is going to the [facility] for senior weight training classes 2 times per week     Previous Attempts at Weight Loss:   Was in a program with [facility] with  and son to eat a plant based diet, states this has led to some of their current dietary habits  Has tried intermittent fasting   Has also tried the Mediterranean diet    Rentho scale - states that this works with an azucena on her phone to calculate BMI and give dietary recommendations     The patient does not have a known family history of diabetes.    Adverse Effects: does note some mild constipation - states she has increased her fluid and fiber intake to help     Objective     Weight Management Pharmacotherapy:  Zepbound 5 mg under the skin once weekly     Historical Weight Management Pharmacotherapy:   Wegovy 1 mg     Pertinent PMH Review:  PMH of Pancreatitis: No  PMH of Retinopathy: No  PMH of MTC: No  PMH of MEN 2: No    Lab Review  Lab Results   Component Value Date    BILITOT 0.8 02/17/2024    CALCIUM 9.6 02/17/2024    CO2 27 02/17/2024     (H) 02/17/2024    CREATININE 0.75 02/17/2024    GLUCOSE 87 02/17/2024    ALKPHOS 31 (L) 02/17/2024    K 4.0 02/17/2024    PROT 6.1 (L) 02/17/2024     02/17/2024    AST 15 02/17/2024    ALT 15 02/17/2024    BUN 12 02/17/2024    ANIONGAP 11 02/17/2024    MG 1.70 05/18/2021    PHOS 2.6 05/18/2021    ALBUMIN 4.2 02/17/2024    GFRF 89 07/14/2023     Lab Results   Component Value Date    TRIG 84 02/17/2024    CHOL 206 (H) 02/17/2024    LDLCALC 133 (H) 02/17/2024    HDL 56.0  "02/17/2024     No results found for: \"HGBA1C\"  No components found for: \"UACR\"  The 10-year ASCVD risk score (Josi TONY, et al., 2019) is: 44.5%    Values used to calculate the score:      Age: 73 years      Sex: Female      Is Non- : No      Diabetic: Yes      Tobacco smoker: No      Systolic Blood Pressure: 171 mmHg      Is BP treated: Yes      HDL Cholesterol: 56 mg/dL      Total Cholesterol: 206 mg/dL    Drug Interactions:  No significant drug-drug interactions exist that require an adjustment to medication therapy.    Wt Readings from Last 4 Encounters:   01/12/24 69.9 kg (154 lb 3.2 oz)   11/03/23 71.7 kg (158 lb)   10/19/23 73.5 kg (162 lb)   08/25/23 71.2 kg (157 lb)     Estimated body mass index is 27.32 kg/m² as calculated from the following:    Height as of 1/12/24: 1.6 m (5' 3\").    Weight as of 1/12/24: 69.9 kg (154 lb 3.2 oz).    Patient Reported Home Weights:   02/21/24: 146 lbs   03/18/24: 143 lbs     Patient Goals:   Weight reduction of > 5% after 12 weeks at highest tolerated dose     Assessment/Plan   Problem List Items Addressed This Visit       Overweight with body mass index (BMI) of 29 to 29.9 in adult - Primary     Mara Montoya is currently on Zepbound for weight loss. Her BMI prior to starting was 29.63 kg/m2 on 10/19/23, her most recent BMI on 01/12/24 is 27.32 kg/m2. At last pharmacy visit she was switched from Wegovy 1 mg once weekly to Zepbound 5 mg once weekly. She has tolerated the switch overall well and just notes some mild constipation. She has been working on increasing her fluid and fiber intake which has helped. She is agreeable to increasing up to the 7.5 mg dose at this time.   CHANGE: Zepbound 7.5 mg under the skin once weekly (increased from 5 mg)  Patient has 1 more pen left at home for next Monday 03/25 then will increase up to the 7.5 mg the following Monday 04/01  We will follow-up in ~1 month to discuss tolerability to the 7.5 mg dose, any " weight loss she's experienced, and increasing up to the 10 mg dose if her BMI is still classified as overweight. Patient was encouraged to reach out if there are any issues with filling the medication due to backorders or if she has any questions or concerns prior to our next follow-up.          Relevant Medications    tirzepatide, weight loss, (Zepbound) 7.5 mg/0.5 mL injection    Other Relevant Orders    Follow Up In [facility] Pharmacy     Pharmacy Follow-Up: 04/22/24  PCP Follow-Up: 04/04/24    Iglesia Watson PharmD     Continue all meds under the continuation of care with the referring provider and clinical pharmacy team.

## 2024-03-18 NOTE — ASSESSMENT & PLAN NOTE
Mara Montoya is currently on Zepbound for weight loss. Her BMI prior to starting was 29.63 kg/m2 on 10/19/23, her most recent BMI on 01/12/24 is 27.32 kg/m2. At last pharmacy visit she was switched from Wegovy 1 mg once weekly to Zepbound 5 mg once weekly. She has tolerated the switch overall well and just notes some mild constipation. She has been working on increasing her fluid and fiber intake which has helped. She is agreeable to increasing up to the 7.5 mg dose at this time.   CHANGE: Zepbound 7.5 mg under the skin once weekly (increased from 5 mg)  Patient has 1 more pen left at home for next Monday 03/25 then will increase up to the 7.5 mg the following Monday 04/01  We will follow-up in ~1 month to discuss tolerability to the 7.5 mg dose, any weight loss she's experienced, and increasing up to the 10 mg dose if her BMI is still classified as overweight. Patient was encouraged to reach out if there are any issues with filling the medication due to backorders or if she has any questions or concerns prior to our next follow-up.

## 2024-03-26 ENCOUNTER — APPOINTMENT (OUTPATIENT)
Dept: PAIN MEDICINE | Facility: CLINIC | Age: 74
End: 2024-03-26
Payer: MEDICARE

## 2024-03-26 ENCOUNTER — HOSPITAL ENCOUNTER (OUTPATIENT)
Dept: RADIOLOGY | Facility: HOSPITAL | Age: 74
Discharge: HOME | End: 2024-03-26
Payer: MEDICARE

## 2024-03-26 VITALS — BODY MASS INDEX: 25.69 KG/M2 | HEIGHT: 63 IN | WEIGHT: 145 LBS

## 2024-03-26 DIAGNOSIS — Z12.31 ENCOUNTER FOR SCREENING MAMMOGRAM FOR BREAST CANCER: ICD-10-CM

## 2024-03-26 PROCEDURE — 77067 SCR MAMMO BI INCL CAD: CPT | Performed by: RADIOLOGY

## 2024-03-26 PROCEDURE — 77063 BREAST TOMOSYNTHESIS BI: CPT | Performed by: RADIOLOGY

## 2024-03-26 PROCEDURE — 77067 SCR MAMMO BI INCL CAD: CPT

## 2024-03-27 DIAGNOSIS — E66.3 OVERWEIGHT WITH BODY MASS INDEX (BMI) OF 29 TO 29.9 IN ADULT: ICD-10-CM

## 2024-04-02 DIAGNOSIS — E66.3 OVERWEIGHT WITH BODY MASS INDEX (BMI) OF 29 TO 29.9 IN ADULT: ICD-10-CM

## 2024-04-02 PROCEDURE — RXMED WILLOW AMBULATORY MEDICATION CHARGE

## 2024-04-02 NOTE — PROGRESS NOTES
Prescription sent over to [facility], they may get the medication in tomorrow to fill.

## 2024-04-03 ENCOUNTER — PHARMACY VISIT (OUTPATIENT)
Dept: PHARMACY | Facility: CLINIC | Age: 74
End: 2024-04-03
Payer: COMMERCIAL

## 2024-04-04 ENCOUNTER — OFFICE VISIT (OUTPATIENT)
Dept: PRIMARY CARE | Facility: CLINIC | Age: 74
End: 2024-04-04
Payer: MEDICARE

## 2024-04-04 VITALS
SYSTOLIC BLOOD PRESSURE: 120 MMHG | TEMPERATURE: 98 F | OXYGEN SATURATION: 99 % | DIASTOLIC BLOOD PRESSURE: 80 MMHG | HEIGHT: 62 IN | WEIGHT: 139.4 LBS | RESPIRATION RATE: 16 BRPM | BODY MASS INDEX: 25.65 KG/M2 | HEART RATE: 78 BPM

## 2024-04-04 DIAGNOSIS — E78.2 MIXED HYPERLIPIDEMIA: ICD-10-CM

## 2024-04-04 DIAGNOSIS — M85.89 OSTEOPENIA OF MULTIPLE SITES: ICD-10-CM

## 2024-04-04 DIAGNOSIS — M54.17 LUMBOSACRAL RADICULITIS: ICD-10-CM

## 2024-04-04 DIAGNOSIS — E66.3 OVERWEIGHT WITH BODY MASS INDEX (BMI) OF 29 TO 29.9 IN ADULT: Primary | ICD-10-CM

## 2024-04-04 DIAGNOSIS — I10 PRIMARY HYPERTENSION: ICD-10-CM

## 2024-04-04 PROCEDURE — 3074F SYST BP LT 130 MM HG: CPT | Performed by: INTERNAL MEDICINE

## 2024-04-04 PROCEDURE — 1159F MED LIST DOCD IN RCRD: CPT | Performed by: INTERNAL MEDICINE

## 2024-04-04 PROCEDURE — 1160F RVW MEDS BY RX/DR IN RCRD: CPT | Performed by: INTERNAL MEDICINE

## 2024-04-04 PROCEDURE — 3008F BODY MASS INDEX DOCD: CPT | Performed by: INTERNAL MEDICINE

## 2024-04-04 PROCEDURE — 3079F DIAST BP 80-89 MM HG: CPT | Performed by: INTERNAL MEDICINE

## 2024-04-04 PROCEDURE — 1036F TOBACCO NON-USER: CPT | Performed by: INTERNAL MEDICINE

## 2024-04-04 PROCEDURE — 1123F ACP DISCUSS/DSCN MKR DOCD: CPT | Performed by: INTERNAL MEDICINE

## 2024-04-04 PROCEDURE — 99214 OFFICE O/P EST MOD 30 MIN: CPT | Performed by: INTERNAL MEDICINE

## 2024-04-04 ASSESSMENT — ENCOUNTER SYMPTOMS
ENDOCRINE NEGATIVE: 1
STRIDOR: 0
DIZZINESS: 0
AGITATION: 0
EYES NEGATIVE: 1
NEUROLOGICAL NEGATIVE: 1
ACTIVITY CHANGE: 0
VOICE CHANGE: 0
EYE REDNESS: 0
APPETITE CHANGE: 0
DIARRHEA: 0
WHEEZING: 0
SINUS PRESSURE: 0
ABDOMINAL PAIN: 0
FREQUENCY: 0
ARTHRALGIAS: 1
VOMITING: 0
SINUS PAIN: 0
CONSTITUTIONAL NEGATIVE: 1
TROUBLE SWALLOWING: 0
CONFUSION: 0
EYE PAIN: 0
NECK STIFFNESS: 0
LIGHT-HEADEDNESS: 0
NECK PAIN: 0
HEADACHES: 0
GASTROINTESTINAL NEGATIVE: 1
CONSTIPATION: 0
SPEECH DIFFICULTY: 0
DIFFICULTY URINATING: 0
MYALGIAS: 0
SEIZURES: 0
SLEEP DISTURBANCE: 0
PALPITATIONS: 0
COUGH: 0
WOUND: 0
HALLUCINATIONS: 0
BACK PAIN: 0
COLOR CHANGE: 0
WEAKNESS: 0
BRUISES/BLEEDS EASILY: 0
CARDIOVASCULAR NEGATIVE: 1
NUMBNESS: 0
RESPIRATORY NEGATIVE: 1
SHORTNESS OF BREATH: 0
SORE THROAT: 0
ALLERGIC/IMMUNOLOGIC NEGATIVE: 1
BLOOD IN STOOL: 0
NAUSEA: 0
TREMORS: 0
ADENOPATHY: 0
EYE DISCHARGE: 0
CHEST TIGHTNESS: 0
POLYDIPSIA: 0
UNEXPECTED WEIGHT CHANGE: 0
NERVOUS/ANXIOUS: 0
FLANK PAIN: 0
DYSURIA: 0
HEMATOLOGIC/LYMPHATIC NEGATIVE: 1
PSYCHIATRIC NEGATIVE: 1
JOINT SWELLING: 0
FACIAL ASYMMETRY: 0
POLYPHAGIA: 0

## 2024-04-04 ASSESSMENT — PATIENT HEALTH QUESTIONNAIRE - PHQ9
1. LITTLE INTEREST OR PLEASURE IN DOING THINGS: NOT AT ALL
SUM OF ALL RESPONSES TO PHQ9 QUESTIONS 1 AND 2: 0
2. FEELING DOWN, DEPRESSED OR HOPELESS: NOT AT ALL

## 2024-04-04 NOTE — PROGRESS NOTES
Subjective   Patient ID: Mara Montoya is a 73 y.o. female who presents for Follow-up (PATIENT IS HERE TODAY DUE TO A 3 MONTH FOLLOW  UP/).  HPI    Patient has been feeling pretty good and has been complaint with prescribed medications.    We reviewed and discussed details of recent blood work: CBC, CMP, TSH, Lipid panel, Hb A1c, Vit D done in  Feb 2024.  Results within acceptable range.  Mildly low protein level noted.  Mildly elevated cholesterol - improved since previous test.     We discussed increasing proteins in the diet.  Counseling was given regarding diet and exercise for weight loss.    Recent mammogram in March 2024 was negative.    She has been tolerating treatment with Zepbound well, except occasional constipation which she has been managing with diet adjustments.  Intentionally lost about 20 lbs.     Patient has been following with [facility] pharmacist, I have been communicating with pharmacist regularly and supervising treatment with Zepbound.   Patient has been having difficulties refilling Zepbound Rx at the pharmacy of her choice, requests printed Rx to facilitate refill at different pharmacy if needed.     She has been exercising regularly at [facility].    Still c/o right lower leg pain when walking.  Patient used to see pain management Dr. Gardiner for lower back pain. Went back to see him in Dec 2023, discussed different modalities for treatment of her condition.  Received diagnostic nerve block in March 2024.  I reviewed recent visit note.       Saw vascular surgeon Dr. Hoffman, advised compression stockings.   May consider sclerotherapy.    Review of Systems   Constitutional: Negative.  Negative for activity change, appetite change and unexpected weight change.   HENT: Negative.  Negative for congestion, ear discharge, ear pain, hearing loss, mouth sores, nosebleeds, sinus pressure, sinus pain, sore throat, trouble swallowing and voice change.    Eyes: Negative.  Negative for pain,  "discharge, redness and visual disturbance.   Respiratory: Negative.  Negative for cough, chest tightness, shortness of breath, wheezing and stridor.    Cardiovascular: Negative.  Negative for chest pain, palpitations and leg swelling.   Gastrointestinal: Negative.  Negative for abdominal pain, blood in stool, constipation, diarrhea, nausea and vomiting.   Endocrine: Negative.  Negative for polydipsia, polyphagia and polyuria.   Genitourinary: Negative.  Negative for difficulty urinating, dysuria, flank pain, frequency and urgency.   Musculoskeletal:  Positive for arthralgias. Negative for back pain, gait problem, joint swelling, myalgias, neck pain and neck stiffness.   Skin: Negative.  Negative for color change, rash and wound.   Allergic/Immunologic: Negative.  Negative for environmental allergies, food allergies and immunocompromised state.   Neurological: Negative.  Negative for dizziness, tremors, seizures, syncope, facial asymmetry, speech difficulty, weakness, light-headedness, numbness and headaches.   Hematological: Negative.  Negative for adenopathy. Does not bruise/bleed easily.   Psychiatric/Behavioral: Negative.  Negative for agitation, behavioral problems, confusion, hallucinations, sleep disturbance and suicidal ideas. The patient is not nervous/anxious.    All other systems reviewed and are negative.      Objective     Review of systems was performed and all systems were negative except what in HPI    /80 (BP Location: Left arm, Patient Position: Sitting, BP Cuff Size: Adult)   Pulse 78   Temp 36.7 °C (98 °F) (Temporal)   Resp 16   Ht 1.575 m (5' 2\")   Wt 63.2 kg (139 lb 6.4 oz)   LMP  (LMP Unknown)   SpO2 99%   BMI 25.50 kg/m²    Physical Exam  Vitals and nursing note reviewed.   Constitutional:       General: She is not in acute distress.     Appearance: Normal appearance.   HENT:      Head: Normocephalic and atraumatic.      Right Ear: External ear normal.      Left Ear: External ear " normal.      Nose: Nose normal. No congestion or rhinorrhea.   Eyes:      General:         Right eye: No discharge.         Left eye: No discharge.      Extraocular Movements: Extraocular movements intact.      Conjunctiva/sclera: Conjunctivae normal.      Pupils: Pupils are equal, round, and reactive to light.   Cardiovascular:      Rate and Rhythm: Normal rate and regular rhythm.      Pulses: Normal pulses.      Heart sounds: Normal heart sounds. No murmur heard.     No friction rub. No gallop.   Pulmonary:      Effort: Pulmonary effort is normal. No respiratory distress.      Breath sounds: Normal breath sounds. No stridor. No wheezing, rhonchi or rales.   Chest:      Chest wall: No tenderness.   Abdominal:      General: Bowel sounds are normal.      Palpations: Abdomen is soft. There is no mass.      Tenderness: There is no abdominal tenderness. There is no guarding or rebound.   Musculoskeletal:         General: No swelling or deformity. Normal range of motion.      Cervical back: Normal range of motion and neck supple. No rigidity or tenderness.      Right lower leg: No edema.      Left lower leg: No edema.   Lymphadenopathy:      Cervical: No cervical adenopathy.   Skin:     General: Skin is warm and dry.      Coloration: Skin is not jaundiced.      Findings: No bruising or erythema.   Neurological:      General: No focal deficit present.      Mental Status: She is alert and oriented to person, place, and time. Mental status is at baseline.      Cranial Nerves: No cranial nerve deficit.      Motor: No weakness.      Coordination: Coordination normal.      Gait: Gait normal.   Psychiatric:         Mood and Affect: Mood normal.         Behavior: Behavior normal.         Thought Content: Thought content normal.         Judgment: Judgment normal.         Assessment/Plan   Problem List Items Addressed This Visit             ICD-10-CM       Cardiac and Vasculature    Mixed hyperlipidemia E78.2     Low cholesterol  and low carbohydrate diet is advised.          Primary hypertension I10     Controlled. Continue current treatment.             Endocrine/Metabolic    Overweight with body mass index (BMI) of 29 to 29.9 in adult - Primary E66.3, Z68.29     Congratulations on your weight loss. Continue tirzepatide. F/up with  [facility] pharmacist.          Relevant Medications    tirzepatide, weight loss, (Zepbound) 10 mg/0.5 mL injection       Musculoskeletal and Injuries    Osteopenia M85.80     Please maintain enough calcium in your diet:  5010-0402 mg daily (consume foods rich in calcium rather than taking only calcium supplement to meet daily calcium requirements), take daily Vitamin D supplement with meal. Average Vit D dose is 2000 international units daily.  Weight bearing exercise routine is recommended.             Neuro    Lumbosacral radiculitis M54.17     Clinically stable. F/up with pain management.        It was a pleasure to see you today.  I would like to remind you about importance of a healthy lifestyle in order to improve your well-being and live longer.  Try to engage in physical activities for at least 150 minutes per week.  Eat about 10 servings of fruits and vegetables daily. My advice is 2 servings of fruits and 8 servings of vegetables.  For vegetables choose at least half of them green and at least half of them fresh.  Please avoid sugar, salt, fried food and saturated fat.    I spent a total of 30 minutes on the date of service for follow up visit, which included preparing to see the patient, face-to-face patient care, completing clinical documentation, obtaining and/or reviewing separately obtained history, performing a medically appropriate examination, counseling and educating the patient/family/caregiver, ordering medications, tests, or procedures, communicating with other health care providers (not separately reported), independently interpreting results (not separately reported), communicating results to  the patient/family/caregiver, and care coordination (not separately reported).    F/up in 3 months or sooner if needed.

## 2024-04-05 NOTE — ASSESSMENT & PLAN NOTE
Please maintain enough calcium in your diet:  9667-2852 mg daily (consume foods rich in calcium rather than taking only calcium supplement to meet daily calcium requirements), take daily Vitamin D supplement with meal. Average Vit D dose is 2000 international units daily.  Weight bearing exercise routine is recommended.

## 2024-04-17 ENCOUNTER — HOSPITAL ENCOUNTER (OUTPATIENT)
Dept: PAIN MEDICINE | Facility: CLINIC | Age: 74
Discharge: HOME | End: 2024-04-17
Payer: MEDICARE

## 2024-04-17 ENCOUNTER — HOSPITAL ENCOUNTER (OUTPATIENT)
Dept: RADIOLOGY | Facility: HOSPITAL | Age: 74
Discharge: HOME | End: 2024-04-17
Payer: MEDICARE

## 2024-04-17 VITALS
SYSTOLIC BLOOD PRESSURE: 183 MMHG | RESPIRATION RATE: 20 BRPM | TEMPERATURE: 96.2 F | OXYGEN SATURATION: 99 % | DIASTOLIC BLOOD PRESSURE: 91 MMHG | HEART RATE: 86 BPM

## 2024-04-17 DIAGNOSIS — M47.816 LUMBAR SPONDYLOSIS: ICD-10-CM

## 2024-04-17 PROCEDURE — 2500000004 HC RX 250 GENERAL PHARMACY W/ HCPCS (ALT 636 FOR OP/ED): Performed by: PAIN MEDICINE

## 2024-04-17 PROCEDURE — 7100000009 HC PHASE TWO TIME - INITIAL BASE CHARGE: Performed by: PAIN MEDICINE

## 2024-04-17 PROCEDURE — 64493 INJ PARAVERT F JNT L/S 1 LEV: CPT | Performed by: PAIN MEDICINE

## 2024-04-17 PROCEDURE — 64493 INJ PARAVERT F JNT L/S 1 LEV: CPT | Mod: 50 | Performed by: PAIN MEDICINE

## 2024-04-17 PROCEDURE — 7100000010 HC PHASE TWO TIME - EACH INCREMENTAL 1 MINUTE: Performed by: PAIN MEDICINE

## 2024-04-17 PROCEDURE — 64494 INJ PARAVERT F JNT L/S 2 LEV: CPT | Performed by: PAIN MEDICINE

## 2024-04-17 PROCEDURE — 2500000005 HC RX 250 GENERAL PHARMACY W/O HCPCS: Performed by: PAIN MEDICINE

## 2024-04-17 RX ORDER — BUPIVACAINE HYDROCHLORIDE 5 MG/ML
10 INJECTION, SOLUTION PERINEURAL ONCE
Status: COMPLETED | OUTPATIENT
Start: 2024-04-17 | End: 2024-04-17

## 2024-04-17 RX ORDER — LIDOCAINE IN NACL,ISO-OSMOT/PF 30 MG/3 ML
10 SYRINGE (ML) INJECTION ONCE
Status: COMPLETED | OUTPATIENT
Start: 2024-04-17 | End: 2024-04-17

## 2024-04-17 RX ADMIN — BUPIVACAINE HYDROCHLORIDE 50 MG: 5 INJECTION, SOLUTION PERINEURAL at 09:09

## 2024-04-17 RX ADMIN — Medication 100 MG: at 09:09

## 2024-04-17 ASSESSMENT — PAIN DESCRIPTION - DESCRIPTORS: DESCRIPTORS: ACHING;TINGLING;SORE

## 2024-04-17 ASSESSMENT — PAIN - FUNCTIONAL ASSESSMENT: PAIN_FUNCTIONAL_ASSESSMENT: 0-10

## 2024-04-17 ASSESSMENT — COLUMBIA-SUICIDE SEVERITY RATING SCALE - C-SSRS
2. HAVE YOU ACTUALLY HAD ANY THOUGHTS OF KILLING YOURSELF?: NO
6. HAVE YOU EVER DONE ANYTHING, STARTED TO DO ANYTHING, OR PREPARED TO DO ANYTHING TO END YOUR LIFE?: NO
1. IN THE PAST MONTH, HAVE YOU WISHED YOU WERE DEAD OR WISHED YOU COULD GO TO SLEEP AND NOT WAKE UP?: NO

## 2024-04-17 ASSESSMENT — PAIN SCALES - GENERAL: PAINLEVEL_OUTOF10: 8

## 2024-04-17 NOTE — OP NOTE
Clinical Note  The patient is here today to receive diagnostic medial nerve branch block bilateral total L3-L4 L4-L5 to assist with pain.    Procedure Note  The patient was taken to the procedure room, was placed into a prone position. The area was prepped and draped sterilely in the normal fashion. The patient was throughout the procedure monitored by the nursing staff. The skin and subcutaneous tissue was anesthetized with 1% lidocaine. Then 22-gauge spinal needles were introduced into the approximation of the medial nerve branches at the above mentioned level, confirmed in AP and oblique view with negative aspiration of heme and CSF. The patient received 0.5 mL of 0.5% Marcaine per site. The patient tolerated the procedure well, was taken to the recovery room, allowed to recover for sufficient amount of time and then was discharged home in stable condition. The patient was advised to followup with the [facility] to reassess the improvement and determine the need for the radiofrequency ablation.    Thank you for allowing me to participate in the care of this patient.

## 2024-04-17 NOTE — DISCHARGE INSTRUCTIONS
Post-injection instructions FOR FACET BLOCK      Pay attention to how much pain relief (what percentage compared to before the procedure) you get and for how long it lasts.     THIS IS A TEMPORARY NUMBING OF PAIN THIS IS BEING USED AS A DIAGNOSTIC INDICATOR IF THIS IS THE SOURCE OF YOUR PAIN    Activity:  RETURN TO NORMAL ACTIVITY  SEE IF YOU CAN APPRECIATE AN IMPROVEMENT IN THE PAIN    Bandages: Remove after 24 hours     Showering/Bathing: You may shower after bandage is removed     Follow up: CALL OFFICE NEXT BUSINESS [phone] LEAVE MESSAGE ABOUT THE % OF RELIEF THAT WAS OBTAINED AND FOR HOW MANY HOURS      Call the OFFICE immediately: if you notice:     Excessive bleeding from procedure site (brisk bright red bleeding from the site or bleeding that soaks the bandages or does not stop)   Severe headache  Inability to walk, leg or arm weakness or numbness that is worse after the procedure   Uncontrolled pain   New urinary or fecal incontinence   Signs of infection: Fever above 101.5F, redness, swelling, pus or drainage from the site

## 2024-04-19 ENCOUNTER — TELEPHONE (OUTPATIENT)
Dept: PAIN MEDICINE | Facility: CLINIC | Age: 74
End: 2024-04-19
Payer: MEDICARE

## 2024-04-19 NOTE — TELEPHONE ENCOUNTER
"Patient reports 80% relief after lumbar facet long as a \"delayed response \" she explains she walked for about an hour and a half at the stores and the pain came back thafter that, but then went away later in the day giving 80% relief.  Do  you wish to proceed to lumbar rfa?  If so could you put order in and forward to secretaries?  Thanks  "

## 2024-04-22 ENCOUNTER — TELEMEDICINE (OUTPATIENT)
Dept: PHARMACY | Facility: HOSPITAL | Age: 74
End: 2024-04-22

## 2024-04-22 DIAGNOSIS — M47.816 LUMBAR SPONDYLOSIS: Primary | ICD-10-CM

## 2024-04-22 DIAGNOSIS — E66.3 OVERWEIGHT WITH BODY MASS INDEX (BMI) OF 29 TO 29.9 IN ADULT: Primary | ICD-10-CM

## 2024-04-22 RX ORDER — TIRZEPATIDE 12.5 MG/.5ML
12.5 INJECTION, SOLUTION SUBCUTANEOUS
Qty: 2 ML | Refills: 0 | Status: SHIPPED
Start: 2024-04-28 | End: 2024-04-22 | Stop reason: ENTERED-IN-ERROR

## 2024-04-22 NOTE — PROGRESS NOTES
Patient is sent at the request of Liza Hebert M* for my opinion regarding weight management.  My final recommendations will be communicated back to the requesting provider by way of shared medical record.    Subjective     Mara Montoya is a 73 y.o. female who is overweight as evidenced by her most recent BMI of 25.50 kg/m2 as of 2024 complicated by hyperlipidemia and hypertension. She was started on Wegovy 0.25 mg once weekly by Dr. Mckeon on 10/19/23. At this time her BMI was 29.63 kg/m2. At last pharmacy visit patient's Zepbound was increased up to 7.5 mg once weekly. Due to backorder patient had been having trouble finding the medication. At last office visit with Dr. Mckeon she printed a hard copy prescription for Zepbound 10 mg once weekly for patient to take to a pharmacy to fill. We are following-up today to discuss tolerability of the 7.5 mg once weekly dose and increasing up to the 10 mg once weekly dose.     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    Past Surgical History:  She has a past surgical history that includes  section, classic (10/28/2013) and Knee surgery (10/28/2013).    Social History:  She reports that she has never smoked. She has never been exposed to tobacco smoke. She has never used smokeless tobacco. She reports current alcohol use of about 7.0 standard drinks of alcohol per week. She reports that she does not use drugs.    Family History:  Family History   Problem Relation Name Age of Onset    Hypertension  Mother      Stroke Mother      Hypertension Father      Crohn's disease Brother      Macular degeneration Mother's Sister       Allergies:  Patient has no known allergies.    Current diet:   Breakfast: egg with toast, coffee  Lunch: tomato with a slice of bread   Dinner: salmon, sweet potatoes, green vegetables   Snacks: not really snacking, is does may have popcorn   Fluids: water, tea at night, has been working on increasing fluid intake   Will also have oranges occasionally     Current exercise:   Has been working on increasing activity   Is using physical therapy exercises at home   Yoga once a week  Pilates once a week   Notes she has had some leg pain when doing a lot of cardio exercises so has cut back on this     Previous Attempts at Weight Loss:   Was in a program with [facility] with  and son to eat a plant based diet, states this has led to some of their current dietary habits  Has tried intermittent fasting   Has also tried the Mediterranean diet    Rentho scale - states that this works with an azucena on her phone to calculate BMI and give dietary recommendations     The patient does not have a known family history of diabetes.    Adverse Effects: does note some mild constipation - Dr. Mckeon suggested patient take Miralax a couple times a week at her last office visit and states this has helped  Does note feeling full if she eats too much but otherwise is tolerating it well      Objective     Weight Management Pharmacotherapy:  Zepbound 7.5 mg under the skin once weekly     Historical Weight Management Pharmacotherapy:   Wegovy 1 mg (switched to Zepbound due to it being cheaper)    Pertinent PMH Review:  PMH of Pancreatitis: No  PMH of Retinopathy: No  PMH of MTC: No  PMH of MEN 2: No    Lab Review  Lab Results   Component Value Date    BILITOT 0.8 02/17/2024    CALCIUM 9.6 02/17/2024    CO2 27 02/17/2024     (H) 02/17/2024    CREATININE 0.75 02/17/2024    GLUCOSE 87 02/17/2024    ALKPHOS 31 (L)  "02/17/2024    K 4.0 02/17/2024    PROT 6.1 (L) 02/17/2024     02/17/2024    AST 15 02/17/2024    ALT 15 02/17/2024    BUN 12 02/17/2024    ANIONGAP 11 02/17/2024    MG 1.70 05/18/2021    PHOS 2.6 05/18/2021    ALBUMIN 4.2 02/17/2024    GFRF 89 07/14/2023     Lab Results   Component Value Date    TRIG 84 02/17/2024    CHOL 206 (H) 02/17/2024    LDLCALC 133 (H) 02/17/2024    HDL 56.0 02/17/2024     No results found for: \"HGBA1C\"  No components found for: \"UACR\"  The 10-year ASCVD risk score (Josi TONY, et al., 2019) is: 49.1%    Values used to calculate the score:      Age: 73 years      Sex: Female      Is Non- : No      Diabetic: Yes      Tobacco smoker: No      Systolic Blood Pressure: 183 mmHg      Is BP treated: Yes      HDL Cholesterol: 56 mg/dL      Total Cholesterol: 206 mg/dL    Drug Interactions:  No significant drug-drug interactions exist that require an adjustment to medication therapy.    Wt Readings from Last 6 Encounters:   04/04/24 63.2 kg (139 lb 6.4 oz)   03/26/24 65.8 kg (145 lb)   01/12/24 69.9 kg (154 lb 3.2 oz)   11/03/23 71.7 kg (158 lb)   10/19/23 73.5 kg (162 lb)   08/25/23 71.2 kg (157 lb)     Estimated body mass index is 25.5 kg/m² as calculated from the following:    Height as of 4/4/24: 1.575 m (5' 2\").    Weight as of 4/4/24: 63.2 kg (139 lb 6.4 oz).    Patient Reported Home Weights:   02/21/24: 146 lbs   03/18/24: 143 lbs   04/22/24: 134 lbs     Body Weight Reduction Since Therapy Initiation: 17.3%    Patient Goals:   Weight reduction of > 5% after 12 weeks at highest tolerated dose     Assessment/Plan   Problem List Items Addressed This Visit       Overweight with body mass index (BMI) of 29 to 29.9 in adult - Primary     Mara Montoya is overweight as evidenced by her starting BMI of 29.63 kg/m2 with weight related comorbidity of hypertension and weight of 162 lbs. She was switched to Zepbound a few months ago due to it being cheaper out of pocket " misty Beck. Today she reports weight of 134 lbs which calculates her BMI to 24.5 kg/m2 which is considered normal.   I discussed with patient today that once her BMI is no longer considered overweight we would likely maintain her at her current dose at that time. Since she has already picked up the 10 mg she will increase up to it at this time. Patient would like to continue increasing the dose however I am concerned about continuing to increase her weight loss further and patient becoming malnourished and/or underweight. She did want her next fill sent in today despite recently picking up the 10 mg dose.   Based on patient's BMI using weight reported today at 24.5 kg/m2 and patient increasing up to the 10 mg once weekly dose this month a refill for the 10 mg strength was submitted to the pharmacy instead of increasing up to the 12.5 mg once weekly dose. I would like to see where patient gets with the 10 mg strength after 4 weeks and I would not like to continue to increase her dose further now that her BMI has decreased down and is considered normal. I would not recommend increasing her Zepbound dose up past 10 mg once weekly.   CHANGE: Zepbound 10 mg under the skin once weekly (increased from 7.5 mg once weekly)  Patient already picked up from the pharmacy   We will follow-up in ~1 month to discuss tolerability to the 10 mg once weekly dose and discuss the plan moving forward.          Relevant Medications    tirzepatide, weight loss, (Zepbound) 10 mg/0.5 mL injection (Start on 5/13/2024)    Other Relevant Orders    Follow Up In [facility] - Pharmacy     Pharmacy Follow-Up: 05/20/2024  PCP Follow-Up: 07/18/2024    Iglesia Watson PharmD     Continue all meds under the continuation of care with the referring provider and clinical pharmacy team.

## 2024-04-22 NOTE — ASSESSMENT & PLAN NOTE
Mara Montoya is overweight as evidenced by her starting BMI of 29.63 kg/m2 with weight related comorbidity of hypertension and weight of 162 lbs. She was switched to Zepbound a few months ago due to it being cheaper out of pocket than Wegovy. Today she reports weight of 134 lbs which calculates her BMI to 24.5 kg/m2 which is considered normal.   I discussed with patient today that once her BMI is no longer considered overweight we would likely maintain her at her current dose at that time. Since she has already picked up the 10 mg she will increase up to it at this time. Patient would like to continue increasing the dose however I am concerned about continuing to increase her weight loss further and patient becoming malnourished and/or underweight. She did want her next fill sent in today despite recently picking up the 10 mg dose.   Based on patient's BMI using weight reported today at 24.5 kg/m2 and patient increasing up to the 10 mg once weekly dose this month a refill for the 10 mg strength was submitted to the pharmacy instead of increasing up to the 12.5 mg once weekly dose. I would like to see where patient gets with the 10 mg strength after 4 weeks and I would not like to continue to increase her dose further now that her BMI has decreased down and is considered normal. I would not recommend increasing her Zepbound dose up past 10 mg once weekly.   CHANGE: Zepbound 10 mg under the skin once weekly (increased from 7.5 mg once weekly)  Patient already picked up from the pharmacy   We will follow-up in ~1 month to discuss tolerability to the 10 mg once weekly dose and discuss the plan moving forward.

## 2024-04-22 NOTE — Clinical Note
Good afternoon Dr. Mckeon!  I spoke with Ms. Montoya today regarding her Zepbound for weight loss. Based on a calculated BMI using her reported home weight today her BMI calculated at 24.5 kg/m2 which is considered normal weight. Patient was adamant that she wants to continue to increase her dose past the 10 mg as we continue to follow-up. After thinking on it after our appointment I would not advise a dose increase past the 10 mg dose as she likely will experience further weight loss over the next month so I refilled the 10 mg dose for her today so [facility] can order it for her when it is due. We will discuss again at our next follow-up appointment next month.   If you have any questions let me know.   Thank you!  Iglesia Watson, PharmD

## 2024-04-30 ENCOUNTER — TELEPHONE (OUTPATIENT)
Dept: PAIN MEDICINE | Facility: CLINIC | Age: 74
End: 2024-04-30
Payer: MEDICARE

## 2024-05-01 DIAGNOSIS — E66.3 OVERWEIGHT WITH BODY MASS INDEX (BMI) OF 29 TO 29.9 IN ADULT: Primary | ICD-10-CM

## 2024-05-10 ENCOUNTER — OFFICE VISIT (OUTPATIENT)
Dept: PRIMARY CARE | Facility: CLINIC | Age: 74
End: 2024-05-10
Payer: MEDICARE

## 2024-05-10 VITALS
RESPIRATION RATE: 16 BRPM | HEART RATE: 86 BPM | WEIGHT: 133.6 LBS | SYSTOLIC BLOOD PRESSURE: 120 MMHG | TEMPERATURE: 98 F | OXYGEN SATURATION: 98 % | BODY MASS INDEX: 24.44 KG/M2 | DIASTOLIC BLOOD PRESSURE: 80 MMHG

## 2024-05-10 DIAGNOSIS — E03.8 OTHER SPECIFIED HYPOTHYROIDISM: ICD-10-CM

## 2024-05-10 DIAGNOSIS — H92.09 EAR ACHE: ICD-10-CM

## 2024-05-10 DIAGNOSIS — J32.9 SINUSITIS, UNSPECIFIED CHRONICITY, UNSPECIFIED LOCATION: Primary | ICD-10-CM

## 2024-05-10 DIAGNOSIS — E78.2 MIXED HYPERLIPIDEMIA: ICD-10-CM

## 2024-05-10 DIAGNOSIS — H61.21 IMPACTED CERUMEN OF RIGHT EAR: ICD-10-CM

## 2024-05-10 DIAGNOSIS — I10 PRIMARY HYPERTENSION: ICD-10-CM

## 2024-05-10 PROCEDURE — 1123F ACP DISCUSS/DSCN MKR DOCD: CPT | Performed by: INTERNAL MEDICINE

## 2024-05-10 PROCEDURE — 1160F RVW MEDS BY RX/DR IN RCRD: CPT | Performed by: INTERNAL MEDICINE

## 2024-05-10 PROCEDURE — 3079F DIAST BP 80-89 MM HG: CPT | Performed by: INTERNAL MEDICINE

## 2024-05-10 PROCEDURE — 3008F BODY MASS INDEX DOCD: CPT | Performed by: INTERNAL MEDICINE

## 2024-05-10 PROCEDURE — 3074F SYST BP LT 130 MM HG: CPT | Performed by: INTERNAL MEDICINE

## 2024-05-10 PROCEDURE — 99213 OFFICE O/P EST LOW 20 MIN: CPT | Performed by: INTERNAL MEDICINE

## 2024-05-10 PROCEDURE — 1159F MED LIST DOCD IN RCRD: CPT | Performed by: INTERNAL MEDICINE

## 2024-05-10 RX ORDER — AMOXICILLIN 875 MG/1
875 TABLET, FILM COATED ORAL 2 TIMES DAILY
Qty: 20 TABLET | Refills: 0 | Status: SHIPPED | OUTPATIENT
Start: 2024-05-10 | End: 2024-05-20 | Stop reason: ALTCHOICE

## 2024-05-10 ASSESSMENT — PATIENT HEALTH QUESTIONNAIRE - PHQ9
SUM OF ALL RESPONSES TO PHQ9 QUESTIONS 1 AND 2: 0
1. LITTLE INTEREST OR PLEASURE IN DOING THINGS: NOT AT ALL
2. FEELING DOWN, DEPRESSED OR HOPELESS: NOT AT ALL

## 2024-05-10 NOTE — PROGRESS NOTES
Subjective   Patient ID: Mara Montoya is a 73 y.o. female who presents for Sinusitis and Otitis Media (Pt is here today due to an ear infect and sinus infection, pt stated she gets cold possible chills , , pt stated right ear feels painful in the back , scalp feels sensitive on the right side , no fever , pt stated she has a little drainage in the back of the throat , pt uses a nasal spray and its not working , pt stated that she has a nasal drainage as well ).  HPI    72 yo female with h/o HTN, HLD, Hypothyroidism, Osteopenia.    Patient has not been feeling well for about 3 weeks, c/o right sided sinus congestion, PND, right ear discomfort, headache. Denies fever, chills, she tried OTC cold medications without much improvement.    She has been tolerating treatment with Zepbound well, except occasional constipation which she has been managing with diet adjustments.  Intentionally lost about 20 lbs.      Patient has been following with [facility] pharmacist, I have been communicating with pharmacist regularly and supervising treatment with Zepbound.   Patient has been having difficulties refilling Zepbound Rx at the pharmacy of her choice, requests printed Rx to facilitate refill at different pharmacy if needed.      She has been exercising regularly at [facility].    Still c/o right lower leg pain when walking.  Patient used to see pain management Dr. Gardiner for lower back pain. Went back to see him in Dec 2023, discussed different modalities for treatment of her condition.  Received diagnostic nerve block in March 2024.  I reviewed recent visit note.         Saw vascular surgeon Dr. Hoffman, advised compression stockings.   May consider sclerotherapy.         Review of Systems   Constitutional:  Positive for fatigue. Negative for activity change, appetite change and unexpected weight change.   HENT:  Positive for congestion, ear pain, postnasal drip and sinus pressure. Negative for ear discharge, hearing  loss, mouth sores, nosebleeds, sinus pain, sore throat, trouble swallowing and voice change.    Eyes: Negative.  Negative for pain, discharge, redness and visual disturbance.   Respiratory: Negative.  Negative for cough, chest tightness, shortness of breath, wheezing and stridor.    Cardiovascular: Negative.  Negative for chest pain, palpitations and leg swelling.   Gastrointestinal: Negative.  Negative for abdominal pain, blood in stool, constipation, diarrhea, nausea and vomiting.   Endocrine: Negative.  Negative for polydipsia, polyphagia and polyuria.   Genitourinary: Negative.  Negative for difficulty urinating, dysuria, flank pain, frequency and urgency.   Musculoskeletal: Negative.  Negative for arthralgias, back pain, gait problem, joint swelling, myalgias, neck pain and neck stiffness.   Skin: Negative.  Negative for color change, rash and wound.   Allergic/Immunologic: Negative.  Negative for environmental allergies, food allergies and immunocompromised state.   Neurological:  Positive for headaches. Negative for dizziness, tremors, seizures, syncope, facial asymmetry, speech difficulty, weakness, light-headedness and numbness.   Hematological: Negative.  Negative for adenopathy. Does not bruise/bleed easily.   Psychiatric/Behavioral: Negative.  Negative for agitation, behavioral problems, confusion, hallucinations, sleep disturbance and suicidal ideas. The patient is not nervous/anxious.    All other systems reviewed and are negative.      Objective     Review of systems was performed and all systems were negative except what in HPI    /80 (BP Location: Left arm, Patient Position: Sitting, BP Cuff Size: Adult)   Pulse 86   Temp 36.7 °C (98 °F) (Temporal)   Resp 16   Wt 60.6 kg (133 lb 9.6 oz)   SpO2 98%   BMI 24.44 kg/m²    Physical Exam  Vitals and nursing note reviewed.   Constitutional:       General: She is not in acute distress.     Appearance: Normal appearance.   HENT:      Head:  Normocephalic and atraumatic.      Right Ear: External ear normal. There is impacted cerumen.      Left Ear: Tympanic membrane, ear canal and external ear normal. There is impacted cerumen.      Nose: Nose normal. No congestion or rhinorrhea.      Comments: PND noted     Mouth/Throat:      Mouth: Mucous membranes are moist.   Eyes:      General:         Right eye: No discharge.         Left eye: No discharge.      Extraocular Movements: Extraocular movements intact.      Conjunctiva/sclera: Conjunctivae normal.      Pupils: Pupils are equal, round, and reactive to light.   Cardiovascular:      Rate and Rhythm: Normal rate and regular rhythm.      Pulses: Normal pulses.      Heart sounds: Normal heart sounds. No murmur heard.     No friction rub. No gallop.   Pulmonary:      Effort: Pulmonary effort is normal. No respiratory distress.      Breath sounds: Normal breath sounds. No stridor. No wheezing, rhonchi or rales.   Chest:      Chest wall: No tenderness.   Abdominal:      Palpations: There is no mass.      Tenderness: There is no abdominal tenderness. There is no guarding or rebound.   Musculoskeletal:         General: No swelling or deformity. Normal range of motion.      Cervical back: Normal range of motion and neck supple. No rigidity or tenderness.      Right lower leg: No edema.      Left lower leg: No edema.   Lymphadenopathy:      Cervical: No cervical adenopathy.   Skin:     General: Skin is warm and dry.      Coloration: Skin is not jaundiced.      Findings: No bruising or erythema.   Neurological:      General: No focal deficit present.      Mental Status: She is alert and oriented to person, place, and time. Mental status is at baseline.      Cranial Nerves: No cranial nerve deficit.      Motor: No weakness.      Coordination: Coordination normal.      Gait: Gait normal.   Psychiatric:         Mood and Affect: Mood normal.         Behavior: Behavior normal.         Thought Content: Thought content  normal.         Judgment: Judgment normal.         Assessment/Plan   Problem List Items Addressed This Visit             ICD-10-CM       Cardiac and Vasculature    Mixed hyperlipidemia E78.2     Low cholesterol and low carbohydrate diet is advised.          Primary hypertension I10     Controlled. Continue current treatment.             ENT    Sinusitis - Primary J32.9    Relevant Medications    amoxicillin (Amoxil) 875 mg tablet    Impacted cerumen of right ear H61.21    Relevant Orders    Referral to ENT    Ear ache H92.09    Relevant Orders    Referral to ENT       Endocrine/Metabolic    Other specified hypothyroidism E03.8     Clinically stable. F/up with endocrinology.           It was a pleasure to see you today.  I would like to remind you about importance of a healthy lifestyle in order to improve your well-being and live longer.  Try to engage in physical activities for at least 150 minutes per week.  Eat about 10 servings of fruits and vegetables daily. My advice is 2 servings of fruits and 8 servings of vegetables.  For vegetables choose at least half of them green and at least half of them fresh.  Please avoid sugar, salt, fried food and saturated fat.    F/up as scheduled or sooner if needed.

## 2024-05-11 PROBLEM — H92.09 EAR ACHE: Status: ACTIVE | Noted: 2024-05-11

## 2024-05-11 PROBLEM — J32.9 SINUSITIS: Status: ACTIVE | Noted: 2024-05-11

## 2024-05-11 PROBLEM — H61.21 IMPACTED CERUMEN OF RIGHT EAR: Status: ACTIVE | Noted: 2024-05-11

## 2024-05-11 ASSESSMENT — ENCOUNTER SYMPTOMS
HEADACHES: 1
COUGH: 0
NECK STIFFNESS: 0
APPETITE CHANGE: 0
NUMBNESS: 0
FACIAL ASYMMETRY: 0
GASTROINTESTINAL NEGATIVE: 1
SORE THROAT: 0
POLYPHAGIA: 0
ALLERGIC/IMMUNOLOGIC NEGATIVE: 1
UNEXPECTED WEIGHT CHANGE: 0
MYALGIAS: 0
SEIZURES: 0
STRIDOR: 0
POLYDIPSIA: 0
ENDOCRINE NEGATIVE: 1
PSYCHIATRIC NEGATIVE: 1
SINUS PRESSURE: 1
EYE PAIN: 0
LIGHT-HEADEDNESS: 0
PALPITATIONS: 0
JOINT SWELLING: 0
EYES NEGATIVE: 1
EYE REDNESS: 0
MUSCULOSKELETAL NEGATIVE: 1
BRUISES/BLEEDS EASILY: 0
HALLUCINATIONS: 0
COLOR CHANGE: 0
WHEEZING: 0
CARDIOVASCULAR NEGATIVE: 1
DIFFICULTY URINATING: 0
FLANK PAIN: 0
ACTIVITY CHANGE: 0
AGITATION: 0
ARTHRALGIAS: 0
SHORTNESS OF BREATH: 0
HEMATOLOGIC/LYMPHATIC NEGATIVE: 1
NAUSEA: 0
CONSTIPATION: 0
SINUS PAIN: 0
FATIGUE: 1
ADENOPATHY: 0
SPEECH DIFFICULTY: 0
RESPIRATORY NEGATIVE: 1
TROUBLE SWALLOWING: 0
DIARRHEA: 0
BACK PAIN: 0
VOMITING: 0
CHEST TIGHTNESS: 0
CONFUSION: 0
WEAKNESS: 0
NERVOUS/ANXIOUS: 0
SLEEP DISTURBANCE: 0
BLOOD IN STOOL: 0
NECK PAIN: 0
TREMORS: 0
DYSURIA: 0
WOUND: 0
ABDOMINAL PAIN: 0
DIZZINESS: 0
EYE DISCHARGE: 0
VOICE CHANGE: 0
FREQUENCY: 0

## 2024-05-11 NOTE — PROGRESS NOTES
Subjective   Patient ID: Mara Montoya is a 73 y.o. female who presents for sinus infection and blocked right ear canal.  HPI  This 73-year-old female is being referred over by her PCP for difficulties with cerumen involving her right ear.  She initially presented there with symptoms possibly related to sinusitis and otitis media.  She stated at the time of that evaluation to be negative for any significant postnasal discharge.  Her PCP did treat her with amoxicillin for presumed infection and referred her for further ENT evaluation.  Ceruminous debris was noted on the surface of the tympanic membrane which could not be cleaned by PCP.  She is anticipating an airflight in 24 hours.  This she has been on amoxicillin for 4 days.  She states she had right-sided temporal discomfort along with hair sensitivity on a scale of 1-10 is 7.  No purulent discharge has been noted nasally.  Review of Systems  A 12 point ROS has been reviewed and are negative for complaint except what is stated in the history of present illness and/or past medical history as noted in the EMR     Active Ambulatory Problems     Diagnosis Date Noted    Varicose veins of lower extremity with pain, right 07/13/2023    Other specified hypothyroidism 07/13/2023    Mixed hyperlipidemia 07/13/2023    Lipoma of right axilla 07/13/2023    Primary hypertension 07/13/2023    Pain in right lower leg 07/13/2023    Erythrocytosis 07/13/2023    Menopausal symptoms 07/13/2023    Acute left otitis media 08/31/2023    Appendiceal mucinous cystadenoma 08/31/2023    Blood pressure elevated without history of HTN 08/31/2023    Cataract 08/31/2023    Closed fracture of base of fifth metatarsal bone of left foot with delayed healing 08/31/2023    Closed fracture of base of fifth metatarsal bone 08/31/2023    Colonic mass 08/31/2023    Combined form of senile cataract of both eyes 08/31/2023    DDD (degenerative disc disease), lumbosacral 08/31/2023    Fungal nail  infection 08/31/2023    Iliotibial band friction syndrome of both knees 08/31/2023    Kyphosis (acquired) (postural) 08/31/2023    Lumbar spondylosis 08/31/2023    Migraine with aura and without status migrainosus, not intractable 08/31/2023    Lumbosacral radiculitis 08/31/2023    Myopia of both eyes 08/31/2023    Myopia with astigmatism and presbyopia 08/31/2023    Nasal congestion 08/31/2023    Neuroma of left lower extremity 08/31/2023    Nevus of left conjunctiva 08/31/2023    Osteopenia 08/31/2023    Lumbar radiculopathy 08/31/2023    Pinguecula of both eyes 08/31/2023    Post-menopausal bleeding 08/31/2023    Primary osteoarthritis of right hip 08/31/2023    Plantar fasciitis, left 08/31/2023    Rash and nonspecific skin eruption 08/31/2023    Segmental and somatic dysfunction 08/31/2023    Sprain and strain of interphalangeal (joint) of hand 08/31/2023    Submucosal colonic lesion 08/31/2023    Tendonitis 08/31/2023    Varicose veins of both lower extremities with pain 08/31/2023    Vision changes 08/31/2023    Vitamin D deficiency 08/31/2023    Overweight with body mass index (BMI) of 29 to 29.9 in adult 10/21/2023    Cold sore 10/21/2023    Sinusitis 05/11/2024    Impacted cerumen of right ear 05/11/2024    Ear ache 05/11/2024    Hearing difficulty 05/13/2024    Type 2 diabetes mellitus without complication (Multi) 05/13/2024     Resolved Ambulatory Problems     Diagnosis Date Noted    No Resolved Ambulatory Problems     Past Medical History:   Diagnosis Date    Asymptomatic menopausal state 01/08/2018    Asymptomatic menopausal state 01/08/2018    Pain in right foot 08/26/2016    Personal history of other endocrine, nutritional and metabolic disease     Superficial foreign body of unspecified parts of thorax, initial encounter 07/24/2015    Superficial foreign body of unspecified parts of thorax, initial encounter 07/24/2015    Thyrotoxicosis with diffuse goiter without thyrotoxic crisis or storm   "        Current Outpatient Medications:     amLODIPine (Norvasc) 5 mg tablet, TAKE 1 TABLET BY MOUTH EVERY DAY, Disp: 90 tablet, Rfl: 3    amoxicillin (Amoxil) 875 mg tablet, Take 1 tablet (875 mg) by mouth 2 times a day for 10 days., Disp: 20 tablet, Rfl: 0    estradiol (Estrace) 0.5 mg tablet, Take 1 tablet (0.5 mg) by mouth once daily., Disp: , Rfl:     fluticasone (Flonase) 50 mcg/actuation nasal spray, INHALE 1 SPRAY IN EACH NOSTRIL TWICE DAILY, Disp: 48 mL, Rfl: 2    levothyroxine (Synthroid, Levoxyl) 125 mcg tablet, TAKE 1 TABLET BY MOUTH EVERY DAY, Disp: 90 tablet, Rfl: 2    liothyronine (Cytomel) 5 mcg tablet, TAKE 2 TABLETS BY MOUTH EVERY DAY, Disp: 180 tablet, Rfl: 3    norethindrone (Aygestin) 5 mg tablet, Take 1 tablet (5 mg) by mouth once daily., Disp: 90 tablet, Rfl: 2    tirzepatide, weight loss, (Zepbound) 12.5 mg/0.5 mL injection, Inject 12.5 mg under the skin every 7 days., Disp: 4 each, Rfl: 0    tretinoin (Retin-A) 0.1 % cream, Apply 1 Application topically., Disp: , Rfl:     valACYclovir (Valtrex) 1 gram tablet, TAKE 2 TABLET BY MOUTH EVERY TWELVE HOURS AS NEEDED FOR 1 DAY, REPEAT IF NEEDED, Disp: 24 tablet, Rfl: 1     Social History     Tobacco Use    Smoking status: Never     Passive exposure: Never    Smokeless tobacco: Never   Substance Use Topics    Alcohol use: Yes     Alcohol/week: 7.0 standard drinks of alcohol     Types: 7 Glasses of wine per week     Comment: Will have a glass of wine with dinner        Past Surgical History:   Procedure Laterality Date     SECTION, CLASSIC  10/28/2013     Section    KNEE SURGERY  10/28/2013    Knee Surgery Right        No Known Allergies     Height 1.575 m (5' 2\"), weight 60.3 kg (133 lb). Objective   Physical Exam  Examination:    CONSTITUTIONAL: Alert, in no acute distress, normal pitch/clarity of voice, well-developed, well-nourished, cooperative.  HEAD/FACE: Normocephalic, atraumatic, no tenderness over the sinuses, facial " strength and movement symmetric.    SKIN: Good turgor, no rashes, no suspicious lesions, in the head and neck.    EYES: Both eyes have normal extraocular movements with no nystagmus, pupils are equal and reactive to light and accommodation, conjunctiva is clear.    EARS: Both ears are negative for external skin abnormalities, external auditory canals are without lesions or signs of inflammation, tympanic membranes are intact and are of normal color and texture, no effusions are seen, light reflexes normal, no mastoid tenderness is noted to palpation, objective hearing is intact.    NOSE: No external skin lesions are noted, nares are patent, septum is intact and noninflamed, nasal turbinates are normal in appearance, sinuses are nontender to palpation bilaterally, no internal lesions or polyps are noted, no discharge is noted.  There was no evidence for purulent nasal discharge or visible nasal polyps on anterior rhinoscopy.    OROPHARYNX/ORAL CAVITY: Mucous membranes of the oropharynx and the oral cavity proper are without lesions or ulcerations, tongue mobility is normal and no lesions are noted, gingiva and alveolar mucosa is intact without lesions, oral mucosa is moist, muscular movement of the palate and gag reflex are normal.  No purulent postnasal discharge was noted.  Movement of the palate and tongue was normal.    NECK: No lymphadenopathy is palpated, neck is supple with full range of motion, thyroid is without swelling or tenderness, trachea is midline, no neck masses are noted.    Lymphatics: No cervical adenopathy or supraclavicular adenopathy noted to palpation.    HEART/VASCULAR: No jugular venous distention is noted, carotid pulsations are intact with a regular rate and rhythm noted,    PULMONARY: Good air movement with normal inspiratory/expiratory effort is noted, no audible wheezing is appreciated.    NEUROLOGIC: Alert and oriented, cranial nerves are grossly intact, gait is normal, sensation in  the head and neck is intact,    PSYCH: oriented to person, place and time, normal mood and affect.    EXTREMITIES: No motor dysfunction of the upper and lower extremity is noted.    Patient ID: Mara Montoya is a 73 y.o. female.    Ear cerumen removal    Date/Time: 5/13/2024 8:53 AM    Performed by: Ed Jerry DMD, MD  Authorized by: Ed Jerry DMD, MD    Consent:     Consent obtained:  Verbal    Consent given by:  Patient    Risks discussed:  Pain and incomplete removal    Alternatives discussed:  No treatment and alternative treatment  Universal protocol:     Procedure explained and questions answered to patient or proxy's satisfaction: yes      Imaging studies available: no      Required blood products, implants, devices, and special equipment available: no      Patient identity confirmed:  Verbally with patient  Procedure details:     Location:  L ear and R ear    Procedure type: curette      Procedure type comment:  Or suction    Procedure outcomes: cerumen removed    Post-procedure details:     Inspection:  No bleeding and ear canal clear    Hearing quality:  Improved    Procedure completion:  Tolerated well, no immediate complications  Comments:      Microscopic evaluation of the right ear did show evidence for ceruminous debris down onto the surface of the tympanic membrane and surrounding ear canal skin.  There did not appear to be any evidence for middle ear disease or acute inflammation.  There was cerumen especially up against the anterior quadrant.  Parasite suctions realize to remove this.  No middle ear fluid was noted.  The left ear was examined was completely clear with no obstruction in the canal and no inflammation of the tympanic membrane.    Assessment/Plan   Problem List Items Addressed This Visit             ICD-10-CM    Nasal congestion - Primary R09.81    Impacted cerumen of right ear H61.21     Other Visit Diagnoses         Codes    Referred ear pain, right     H92.01     Hypertrophy of nasal turbinates     J34.3             I discussed the findings with the patient. Do to the history of cerumen impactions, avoidance of Q tip use and water contamination is advised. Periodic check ups in the office or with the PCP are advised and if recurrent obstructions are noted a scheduled cleaning schedule will be maintained. Ear pain, otorhea, changes in hearing should be reported to the office. For some the use of debrox or baby oil can be helpful as long as avila TM is intact and not perforated.  Her exam today did not show evidence for purulent nasal discharge and there did not appear to be any significant obstruction intranasally.  I did recommend that she use nasal saline misting of her nose during the day, stay on fluticasone spray, finish the antibiotic as prescribed by her PCP.  In regards to flying I did discuss with her barotrauma and the use of Afrin as a decongestant spray half an hour before flight.  She should also stay on the topical steroidal spray, get airplane plugs to help equalize the pressure in the canal.  If her pain is not improving then she should be reevaluated with imaging studies done because of the quality of pain that she has.  Hearing test would also be appropriate at a follow-up.    Ed Jerry DMD, MD 05/13/24 8:56 AM

## 2024-05-13 ENCOUNTER — OFFICE VISIT (OUTPATIENT)
Dept: OTOLARYNGOLOGY | Facility: CLINIC | Age: 74
End: 2024-05-13
Payer: MEDICARE

## 2024-05-13 VITALS — HEIGHT: 62 IN | BODY MASS INDEX: 24.48 KG/M2 | WEIGHT: 133 LBS

## 2024-05-13 DIAGNOSIS — H61.21 IMPACTED CERUMEN OF RIGHT EAR: ICD-10-CM

## 2024-05-13 DIAGNOSIS — R09.81 NASAL CONGESTION: Primary | ICD-10-CM

## 2024-05-13 DIAGNOSIS — H92.01 REFERRED EAR PAIN, RIGHT: ICD-10-CM

## 2024-05-13 DIAGNOSIS — J34.3 HYPERTROPHY OF NASAL TURBINATES: ICD-10-CM

## 2024-05-13 PROBLEM — H91.90 HEARING DIFFICULTY: Status: ACTIVE | Noted: 2024-05-13

## 2024-05-13 PROBLEM — E11.9 TYPE 2 DIABETES MELLITUS WITHOUT COMPLICATION (MULTI): Status: ACTIVE | Noted: 2024-05-13

## 2024-05-13 PROCEDURE — 3050F LDL-C >= 130 MG/DL: CPT | Performed by: OTOLARYNGOLOGY

## 2024-05-13 PROCEDURE — 1036F TOBACCO NON-USER: CPT | Performed by: OTOLARYNGOLOGY

## 2024-05-13 PROCEDURE — 69210 REMOVE IMPACTED EAR WAX UNI: CPT | Performed by: OTOLARYNGOLOGY

## 2024-05-13 PROCEDURE — 99203 OFFICE O/P NEW LOW 30 MIN: CPT | Performed by: OTOLARYNGOLOGY

## 2024-05-13 PROCEDURE — 1159F MED LIST DOCD IN RCRD: CPT | Performed by: OTOLARYNGOLOGY

## 2024-05-13 PROCEDURE — 1123F ACP DISCUSS/DSCN MKR DOCD: CPT | Performed by: OTOLARYNGOLOGY

## 2024-05-13 PROCEDURE — 1160F RVW MEDS BY RX/DR IN RCRD: CPT | Performed by: OTOLARYNGOLOGY

## 2024-05-13 PROCEDURE — 3008F BODY MASS INDEX DOCD: CPT | Performed by: OTOLARYNGOLOGY

## 2024-05-20 ENCOUNTER — TELEMEDICINE (OUTPATIENT)
Dept: PHARMACY | Facility: HOSPITAL | Age: 74
End: 2024-05-20
Payer: MEDICARE

## 2024-05-20 DIAGNOSIS — E66.3 OVERWEIGHT WITH BODY MASS INDEX (BMI) OF 29 TO 29.9 IN ADULT: Primary | ICD-10-CM

## 2024-05-20 NOTE — ASSESSMENT & PLAN NOTE
Mara Montoya has history of overweight BMI as evidenced by her starting BMI of 29.63 kg/m2 with weight comorbidities including hypertension. She saw Dr. Mckeon in-office last on 05/13/24 with weight of 60.3 kg and calculated BMI of 24.33 kg/m2 which is considered normal. She was increased up to the 10 mg dose of Zepbound at last pharmacy encounter and reports still having some mild constipation but is overall tolerating it well.   She was provided with a paper script by Dr. Mckeon for the 12.5 mg once weekly dose of Zepbound so she is able to take it to whichever pharmacy she can find that has it in stock.   CHANGE: Zepbound 12.5 mg under the skin once weekly (increased from 10 mg once weekly)  Patient did ask about how a maintenance dose is determined with the medication. I explained to patient that with Zepbound, it is just increased based on clinical results. Any dose from 10-15 mg once weekly I feel is a good maintenance dose as the largest increase in effect is seen from 7.5-10 mg once weekly and then from 10-12.5 mg once weekly.   We will plan to follow-up in ~4 weeks to see how patient is tolerating the increased dose and to discuss a maintenance dose moving forward. She was instructed to reach out if she has any trouble finding the 12.5 mg at [facility] pharmacy as our  Pharmacy currently still has it in stock at our home delivery pharmacy and we could always take her back down to the 10 mg once weekly dose if it's the only strength available as well. She was encouraged to also reach out with any questions and/or concerns.

## 2024-05-20 NOTE — PROGRESS NOTES
Patient is sent at the request of Liza Hebert M* for my opinion regarding weight management.  My final recommendations will be communicated back to the requesting provider by way of shared medical record.    Subjective     Mara Montoya is a 73 y.o. female who is overweight as evidenced by her most recent BMI of 25.50 kg/m2 as of 2024 complicated by hyperlipidemia and hypertension. She was started on Wegovy 0.25 mg once weekly by Dr. Mckeon on 10/19/23. At this time her BMI was 29.63 kg/m2. At last pharmacy visit patient's Zepbound was increased up to 10 mg once weekly. Due to backorder patient had been having trouble finding the medication so Dr. Mckeon had printed a hard copy prescription for patient to take around to try and find it.     Since last pharmacy encounter patient did see Dr. Mckeon on 05/10/24 and had an in-office measured weight of 60.3 kg with calculated BMI of 24.33 kg/m2 which is considered normal BMI. Dr. Mckeon provided patient with a paper prescription for the 12.5 mg dose of Zepbound as well so she can find it at a local pharmacy.     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    Past Surgical History:  She has a past surgical history that includes  section, classic (10/28/2013) and Knee surgery (10/28/2013).    Social History:  She reports that she has never smoked. She has never been exposed to tobacco smoke. She has never used smokeless tobacco. She reports current alcohol use of about 7.0 standard drinks of alcohol  per week. She reports that she does not use drugs.    Family History:  Family History   Problem Relation Name Age of Onset    Hypertension Mother      Stroke Mother      Hypertension Father      Crohn's disease Brother      Macular degeneration Mother's Sister       Allergies:  Patient has no known allergies.    Current diet:   Breakfast: egg with toast, coffee  Lunch: tomato with a slice of bread   Dinner: salmon, sweet potatoes, green vegetables   Snacks: not really snacking, is does may have popcorn   Fluids: water, tea at night, has been working on increasing fluid intake   Will also have oranges occasionally     Current exercise:   Has been working on increasing activity   Is using physical therapy exercises at home   Yoga once a week  Pilates once a week   Notes she has had some leg pain when doing a lot of cardio exercises so has cut back on this     Previous Attempts at Weight Loss:   Was in a program with [facility] with  and son to eat a plant based diet, states this has led to some of their current dietary habits  Has tried intermittent fasting   Has also tried the Mediterranean diet    Rentho scale - states that this works with an azucena on her phone to calculate BMI and give dietary recommendations     The patient does not have a known family history of diabetes.    Adverse Effects: does note some mild constipation - Dr. Mckeon suggested patient take Miralax a couple times a week at her last office visit and states this has helped (takes Miralax intermittently)  Does note feeling full if she eats too much but otherwise is tolerating it well      Objective     Weight Management Pharmacotherapy:  Zepbound 10 mg under the skin once weekly     Historical Weight Management Pharmacotherapy:   Wegovy 1 mg (switched to Zepbound due to it being cheaper)    Pertinent PMH Review:  PMH of Pancreatitis: No  PMH of Retinopathy: No  PMH of MTC: No  PMH of MEN 2: No    Lab Review  Lab Results   Component Value Date     "BILITOT 0.8 02/17/2024    CALCIUM 9.6 02/17/2024    CO2 27 02/17/2024     (H) 02/17/2024    CREATININE 0.75 02/17/2024    GLUCOSE 87 02/17/2024    ALKPHOS 31 (L) 02/17/2024    K 4.0 02/17/2024    PROT 6.1 (L) 02/17/2024     02/17/2024    AST 15 02/17/2024    ALT 15 02/17/2024    BUN 12 02/17/2024    ANIONGAP 11 02/17/2024    MG 1.70 05/18/2021    PHOS 2.6 05/18/2021    ALBUMIN 4.2 02/17/2024    GFRF 89 07/14/2023     Lab Results   Component Value Date    TRIG 84 02/17/2024    CHOL 206 (H) 02/17/2024    LDLCALC 133 (H) 02/17/2024    HDL 56.0 02/17/2024     No results found for: \"HGBA1C\"  No components found for: \"UACR\"  The 10-year ASCVD risk score (Josi TONY, et al., 2019) is: 25%    Values used to calculate the score:      Age: 73 years      Sex: Female      Is Non- : No      Diabetic: Yes      Tobacco smoker: No      Systolic Blood Pressure: 120 mmHg      Is BP treated: Yes      HDL Cholesterol: 56 mg/dL      Total Cholesterol: 206 mg/dL    Drug Interactions:  No significant drug-drug interactions exist that require an adjustment to medication therapy.    Wt Readings from Last 6 Encounters:   05/13/24 60.3 kg (133 lb)   05/10/24 60.6 kg (133 lb 9.6 oz)   04/04/24 63.2 kg (139 lb 6.4 oz)   03/26/24 65.8 kg (145 lb)   01/12/24 69.9 kg (154 lb 3.2 oz)   11/03/23 71.7 kg (158 lb)     Estimated body mass index is 24.33 kg/m² as calculated from the following:    Height as of 5/13/24: 1.575 m (5' 2\").    Weight as of 5/13/24: 60.3 kg (133 lb).    Patient Reported Home Weights:   02/21/24: 146 lbs   03/18/24: 143 lbs   04/22/24: 134 lbs     Body Weight Reduction Since Therapy Initiation: 18%    Patient Goals:   Weight reduction of > 5% after 12 weeks at highest tolerated dose     Assessment/Plan   Problem List Items Addressed This Visit       Overweight with body mass index (BMI) of 29 to 29.9 in adult - Primary     Mara Montoya has history of overweight BMI as evidenced by her " starting BMI of 29.63 kg/m2 with weight comorbidities including hypertension. She saw Dr. Mckeon in-office last on 05/13/24 with weight of 60.3 kg and calculated BMI of 24.33 kg/m2 which is considered normal. She was increased up to the 10 mg dose of Zepbound at last pharmacy encounter and reports still having some mild constipation but is overall tolerating it well.   She was provided with a paper script by Dr. Mckeon for the 12.5 mg once weekly dose of Zepbound so she is able to take it to whichever pharmacy she can find that has it in stock.   CHANGE: Zepbound 12.5 mg under the skin once weekly (increased from 10 mg once weekly)  Patient did ask about how a maintenance dose is determined with the medication. I explained to patient that with Zepbound, it is just increased based on clinical results. Any dose from 10-15 mg once weekly I feel is a good maintenance dose as the largest increase in effect is seen from 7.5-10 mg once weekly and then from 10-12.5 mg once weekly.   We will plan to follow-up in ~4 weeks to see how patient is tolerating the increased dose and to discuss a maintenance dose moving forward. She was instructed to reach out if she has any trouble finding the 12.5 mg at [facility] pharmacy as our  Pharmacy currently still has it in stock at our home delivery pharmacy and we could always take her back down to the 10 mg once weekly dose if it's the only strength available as well. She was encouraged to also reach out with any questions and/or concerns.          Relevant Orders    Follow Up In [facility] - Pharmacy     Pharmacy Follow-Up: 06/17/2024  PCP Follow-Up: 07/18/2024    Iglesia Watson PharmD     Continue all meds under the continuation of care with the referring provider and clinical pharmacy team.

## 2024-05-22 ENCOUNTER — HOSPITAL ENCOUNTER (OUTPATIENT)
Dept: PAIN MEDICINE | Facility: CLINIC | Age: 74
Discharge: HOME | End: 2024-05-22
Payer: MEDICARE

## 2024-05-22 ENCOUNTER — HOSPITAL ENCOUNTER (OUTPATIENT)
Dept: RADIOLOGY | Facility: HOSPITAL | Age: 74
Discharge: HOME | End: 2024-05-22
Payer: MEDICARE

## 2024-05-22 VITALS
SYSTOLIC BLOOD PRESSURE: 123 MMHG | OXYGEN SATURATION: 99 % | TEMPERATURE: 64.4 F | DIASTOLIC BLOOD PRESSURE: 72 MMHG | HEART RATE: 91 BPM | RESPIRATION RATE: 16 BRPM

## 2024-05-22 DIAGNOSIS — M47.816 LUMBAR SPONDYLOSIS: ICD-10-CM

## 2024-05-22 PROCEDURE — 64635 DESTROY LUMB/SAC FACET JNT: CPT | Mod: 50

## 2024-05-22 PROCEDURE — 99152 MOD SED SAME PHYS/QHP 5/>YRS: CPT | Performed by: PAIN MEDICINE

## 2024-05-22 PROCEDURE — A4649 SURGICAL SUPPLIES: HCPCS | Performed by: PAIN MEDICINE

## 2024-05-22 PROCEDURE — 7100000009 HC PHASE TWO TIME - INITIAL BASE CHARGE: Performed by: PAIN MEDICINE

## 2024-05-22 PROCEDURE — 3700000012 HC SEDATION LEVEL 5+ TIME - INITIAL 15 MINUTES 5/> YEARS: Performed by: PAIN MEDICINE

## 2024-05-22 PROCEDURE — 64636 DESTROY L/S FACET JNT ADDL: CPT | Mod: RT

## 2024-05-22 PROCEDURE — 7100000010 HC PHASE TWO TIME - EACH INCREMENTAL 1 MINUTE: Performed by: PAIN MEDICINE

## 2024-05-22 PROCEDURE — 2500000004 HC RX 250 GENERAL PHARMACY W/ HCPCS (ALT 636 FOR OP/ED): Performed by: PAIN MEDICINE

## 2024-05-22 PROCEDURE — 64636 DESTROY L/S FACET JNT ADDL: CPT | Performed by: PAIN MEDICINE

## 2024-05-22 PROCEDURE — 2500000005 HC RX 250 GENERAL PHARMACY W/O HCPCS: Performed by: PAIN MEDICINE

## 2024-05-22 PROCEDURE — 64635 DESTROY LUMB/SAC FACET JNT: CPT | Performed by: PAIN MEDICINE

## 2024-05-22 RX ORDER — MIDAZOLAM HYDROCHLORIDE 1 MG/ML
2 INJECTION, SOLUTION INTRAMUSCULAR; INTRAVENOUS
Status: DISCONTINUED | OUTPATIENT
Start: 2024-05-22 | End: 2024-05-23 | Stop reason: HOSPADM

## 2024-05-22 RX ORDER — BUPIVACAINE HYDROCHLORIDE 5 MG/ML
10 INJECTION, SOLUTION PERINEURAL ONCE
Status: COMPLETED | OUTPATIENT
Start: 2024-05-22 | End: 2024-05-22

## 2024-05-22 RX ORDER — LIDOCAINE IN NACL,ISO-OSMOT/PF 30 MG/3 ML
10 SYRINGE (ML) INJECTION ONCE
Status: COMPLETED | OUTPATIENT
Start: 2024-05-22 | End: 2024-05-22

## 2024-05-22 RX ADMIN — MIDAZOLAM 2 MG: 1 INJECTION INTRAMUSCULAR; INTRAVENOUS at 08:30

## 2024-05-22 RX ADMIN — BUPIVACAINE HYDROCHLORIDE 50 MG: 5 INJECTION, SOLUTION INFILTRATION; PERINEURAL at 08:40

## 2024-05-22 RX ADMIN — Medication 100 MG: at 08:40

## 2024-05-22 ASSESSMENT — PAIN - FUNCTIONAL ASSESSMENT: PAIN_FUNCTIONAL_ASSESSMENT: 0-10

## 2024-05-22 ASSESSMENT — PAIN SCALES - GENERAL: PAINLEVEL_OUTOF10: 6

## 2024-05-22 NOTE — OP NOTE
Surgical Indications  The patient is here today to receive a radiofrequency ablation of the bilateral L3-L4 L4-L5 medial  nerve to assist with the pain condition.    Versed 2 mg     Operative Report  The patient was taken to the procedure room, was placed into a prone positioning. The skin was prepped and draped sterilely in the normal fashion. Under fluoroscopic guidance the medial nerve root branches were identified and the patient was throughout the procedure monitored by the nursing staff. The skin and subcutaneous tissues were anesthetized with 1% lidocaine. Then 20-gauge RF needles were introduced into the approximation of the medial nerve branches at the above mentioned level and confirmed in AP and oblique view, with negative aspiration of heme and CSF, with positive sensory stimulation and negative motor stimulation. Then the patient received 1 mL of 0.5% bupivacaine per site and radiofrequency ablation at temperature of 80°C for 90 seconds was achieved. Patient tolerated the procedure well, was taken to the recovery room, allowed to recover for a sufficient amount of time and then was discharged home in stable condition. The patient was advised to followup with the [facility] to reassess the improvement on as-needed basis. Take you for allowing me to participate in the care of this patient.

## 2024-05-22 NOTE — DISCHARGE INSTRUCTIONS
Post-injection instructions FOR Radiofrequency Ablation    Your radiofrequency ablation was completed today is not unusual to have some exacerbation of the pain before you get better.  Radiofrequency ablation takes an average of 2 to 3 weeks before it completely starts showing full results      Activity:  RETURN TO NORMAL ACTIVITY once the sedation is completely worn off do not sign any legal document for the first 24 hours do not drive or operate machinery for the first 24 hours until the sedation effect is completely worn off    Bandages: Remove after 24 hours     Showering/Bathing: You may shower after bandage is removed     May use ice at the site of the injection for the first 24 hours      Call the OFFICE immediately: if you notice:     Excessive bleeding from procedure site (brisk bright red bleeding from the site or bleeding that soaks the bandages or does not stop)   Severe headache  Inability to walk, leg or arm weakness or numbness that is worse after the procedure   Uncontrolled pain   New urinary or fecal incontinence   Signs of infection: Fever above 101.5F, redness, swelling, pus or drainage from the site    Please contact the pain clinic at [phone]  in 3 weeks to report results or with any further questions or concerns

## 2024-05-22 NOTE — H&P
History Of Present Illness  Mara Montoya is a 73 y.o. female presenting with chronic back pain here today for radiofrequency ablation of the medial nerve branches L3-L4 L4-L5 bilaterally     Past Medical History  Past Medical History:   Diagnosis Date    Asymptomatic menopausal state 2018    Asymptomatic age-related postmenopausal state    Asymptomatic menopausal state 2018    Asymptomatic age-related postmenopausal state    Pain in right foot 2016    Pain of right heel    Personal history of other endocrine, nutritional and metabolic disease     History of hypothyroidism    Superficial foreign body of unspecified parts of thorax, initial encounter 2015    Superficial foreign body of trunk with infection, initial encounter    Superficial foreign body of unspecified parts of thorax, initial encounter 2015    Superficial foreign body of trunk with infection, initial encounter    Thyrotoxicosis with diffuse goiter without thyrotoxic crisis or storm     Graves disease       Surgical History  Past Surgical History:   Procedure Laterality Date     SECTION, CLASSIC  10/28/2013     Section    KNEE SURGERY  10/28/2013    Knee Surgery Right        Social History  She reports that she has never smoked. She has never been exposed to tobacco smoke. She has never used smokeless tobacco. She reports current alcohol use of about 7.0 standard drinks of alcohol per week. She reports that she does not use drugs.    Family History  Family History   Problem Relation Name Age of Onset    Hypertension Mother      Stroke Mother      Hypertension Father      Crohn's disease Brother      Macular degeneration Mother's Sister          Allergies  Patient has no known allergies.    Review of Systems   All 13 systems were reviewed and are within normal levels except as noted below or per HPI. Positive and pertinent negative responses are noted below or in the HPI   Denied any fever or chills. No  weight loss and no night sweats. No cough or sputum production. No diarrhea   No constipation  No bladder and bowel incontinence and no other changes in bladder and bowel. No skin changes.   Denied opioids diversion and abuse and denies alcoholism. Denies overuse of  pain medications.    Physical Exam       Past medical history no interval changes has been noted    On physical examination    General   Alert, oriented x3 pleasant and cooperative. Does not look in any major distress.    HEENT  Pupils normal in size. Ears, nose, mouth, and throat appear to be in normal condition.  Head atraumatic      No signs of sedation or signs of withdrawal apparent.    Psychiatric   No signs of depression apparent.    Neuro   No focal neurological deficit apparent. Ambulation at baseline.      Respiratory  No respiratory distress     Abdomen  no distention     Skin  No skin markings supportive of recent IV drug usage .    Cardiovascular  Regular rate and rhythm     Last Recorded Vitals  Blood pressure 143/84, pulse 72, temperature (!) 18 °C (64.4 °F), resp. rate 18, SpO2 98%.    Relevant Results         Assessment/Plan   Active Problems:  There are no active Hospital Problems.      Here today for radiofrequency ablation of the medial nerve branches bilaterally L3-L4-L5         Gilberto Bradley MD

## 2024-06-10 DIAGNOSIS — E66.3 OVERWEIGHT WITH BODY MASS INDEX (BMI) OF 29 TO 29.9 IN ADULT: Primary | ICD-10-CM

## 2024-06-17 ENCOUNTER — APPOINTMENT (OUTPATIENT)
Dept: PHARMACY | Facility: HOSPITAL | Age: 74
End: 2024-06-17
Payer: MEDICARE

## 2024-06-17 DIAGNOSIS — E66.3 OVERWEIGHT WITH BODY MASS INDEX (BMI) OF 29 TO 29.9 IN ADULT: Primary | ICD-10-CM

## 2024-06-17 NOTE — ASSESSMENT & PLAN NOTE
Mara Montoya has history of overweight BMI as evidenced by her starting BMI of 29.63 kg/m2 with weight comorbidities including hypertension. She saw Dr. Mckeon in-office last on 05/13/24 with weight of 60.3 kg and calculated BMI of 24.33 kg/m2 which is considered normal.   Since last pharmacy encounter she stayed at the 10 mg once weekly dose due to availability due to backorder. She has picked up the 12.5 mg/dose pens and will increase up to 12.5 mg starting next week.   CHANGE: Zepbound 12.5 mg under the skin once weekly (increased from 10 mg once weekly)  Medications are dosed appropriately per most recent renal and hepatic function.   Patient has continued to lose weight even staying at the 10 mg once weekly dose. We will continue to monitor weight loss to ensure patient is not losing too much weight and is eating enough food.   We will plan to follow-up in ~4 weeks to see how she has tolerated the 12.5 mg once weekly dose. She was encouraged to reach out with any questions and/or concerns prior to our next follow-up.

## 2024-06-17 NOTE — PROGRESS NOTES
Patient is sent at the request of Liza Hebert M* for my opinion regarding weight management.  My final recommendations will be communicated back to the requesting provider by way of shared medical record.    Subjective     Mara Montoya is a 73 y.o. female who is overweight as evidenced by her most recent BMI of 24.33 kg/m2 as of 24 complicated by hyperlipidemia and hypertension. She was started on Wegovy 0.25 mg once weekly by Dr. Mckeon on 10/19/23. At this time her BMI was 29.63 kg/m2.     At last pharmacy visit patient's Zepbound was increased up to 12.5 mg once weekly. Today she reports that she was unable to fill the 12.5 mg prescription when originally prescribed due to it being unavailable so she picked up the 10 mg prescription which she also had an active e-script for. Since then she was able to  the 12.5 mg dose so she will start the increased dose next week.     Due to backorder patient had been having trouble finding the medication so Dr. Mckeon had printed a hard copy prescription for the 15 mg strength for patient to take around to try and find it when she is ready to have it filled.     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    Past Surgical History:  She has a past surgical history that includes  section, classic (10/28/2013) and Knee surgery (10/28/2013).    Social History:  She reports that she has never smoked. She has never been exposed to tobacco smoke. She has never used smokeless tobacco. She reports  current alcohol use of about 7.0 standard drinks of alcohol per week. She reports that she does not use drugs.    Family History:  Family History   Problem Relation Name Age of Onset    Hypertension Mother      Stroke Mother      Hypertension Father      Crohn's disease Brother      Macular degeneration Mother's Sister       Allergies:  Patient has no known allergies.    Current diet:   Breakfast: egg with toast, coffee  Lunch: tomato with a slice of bread   Dinner: salmon, sweet potatoes, green vegetables   Snacks: not really snacking, is does may have popcorn   Fluids: water, tea at night, has been working on increasing fluid intake   Will also have oranges occasionally   Has been eating more hardboiled eggs recently     Current exercise:   Has been working on increasing activity   Is using physical therapy exercises at home   Yoga once a week  Pilates once a week   Notes she has had some leg pain when doing a lot of cardio exercises so has cut back on this     Previous Attempts at Weight Loss:   Was in a program with [facility] with  and son to eat a plant based diet, states this has led to some of their current dietary habits  Has tried intermittent fasting   Has also tried the Mediterranean diet    Rentho scale - states that this works with an azucena on her phone to calculate BMI and give dietary recommendations     The patient does not have a known family history of diabetes.    Adverse Effects:   Does note some mild constipation (takes Miralax ~1 time per week which helps);   Does note feeling full if she eats too much but otherwise is tolerating it well      Objective     Weight Management Pharmacotherapy:  Zepbound 12.5 mg under the skin once weekly     Historical Weight Management Pharmacotherapy:   Wegovy 1 mg (switched to Zepbound due to it being cheaper)    Pertinent PMH Review:  PMH of Pancreatitis: No  PMH of Retinopathy: No  PMH of MTC: No  PMH of MEN 2: No    Lab Review  Lab Results   Component Value  "Date    BILITOT 0.8 02/17/2024    CALCIUM 9.6 02/17/2024    CO2 27 02/17/2024     (H) 02/17/2024    CREATININE 0.75 02/17/2024    GLUCOSE 87 02/17/2024    ALKPHOS 31 (L) 02/17/2024    K 4.0 02/17/2024    PROT 6.1 (L) 02/17/2024     02/17/2024    AST 15 02/17/2024    ALT 15 02/17/2024    BUN 12 02/17/2024    ANIONGAP 11 02/17/2024    MG 1.70 05/18/2021    PHOS 2.6 05/18/2021    ALBUMIN 4.2 02/17/2024    GFRF 89 07/14/2023     Lab Results   Component Value Date    TRIG 84 02/17/2024    CHOL 206 (H) 02/17/2024    LDLCALC 133 (H) 02/17/2024    HDL 56.0 02/17/2024     No results found for: \"HGBA1C\"  No components found for: \"UACR\"  The 10-year ASCVD risk score (Josi TONY, et al., 2019) is: 26.1%    Values used to calculate the score:      Age: 73 years      Sex: Female      Is Non- : No      Diabetic: Yes      Tobacco smoker: No      Systolic Blood Pressure: 123 mmHg      Is BP treated: Yes      HDL Cholesterol: 56 mg/dL      Total Cholesterol: 206 mg/dL    Drug Interactions:  No significant drug-drug interactions exist that require an adjustment to medication therapy.    Wt Readings from Last 6 Encounters:   05/13/24 60.3 kg (133 lb)   05/10/24 60.6 kg (133 lb 9.6 oz)   04/04/24 63.2 kg (139 lb 6.4 oz)   03/26/24 65.8 kg (145 lb)   01/12/24 69.9 kg (154 lb 3.2 oz)   11/03/23 71.7 kg (158 lb)     Estimated body mass index is 24.33 kg/m² as calculated from the following:    Height as of 5/13/24: 1.575 m (5' 2\").    Weight as of 5/13/24: 60.3 kg (133 lb).    Patient Reported Home Weights:   02/21/24: 146 lbs   03/18/24: 143 lbs   04/22/24: 134 lbs   06/17/24: 128 lbs (calculated BMI 23.4 kg/m2)    Body Weight Reduction Since Therapy Initiation: ~17%    Patient Goals:   Weight reduction of > 5% after 12 weeks at highest tolerated dose     Assessment/Plan   Problem List Items Addressed This Visit       Overweight with body mass index (BMI) of 29 to 29.9 in adult     Mara Montoya has " history of overweight BMI as evidenced by her starting BMI of 29.63 kg/m2 with weight comorbidities including hypertension. She saw Dr. Mckeon in-office last on 05/13/24 with weight of 60.3 kg and calculated BMI of 24.33 kg/m2 which is considered normal.   Since last pharmacy encounter she stayed at the 10 mg once weekly dose due to availability due to backorder. She has picked up the 12.5 mg/dose pens and will increase up to 12.5 mg starting next week.   CHANGE: Zepbound 12.5 mg under the skin once weekly (increased from 10 mg once weekly)  Medications are dosed appropriately per most recent renal and hepatic function.   Patient has continued to lose weight even staying at the 10 mg once weekly dose. We will continue to monitor weight loss to ensure patient is not losing too much weight and is eating enough food.   We will plan to follow-up in ~4 weeks to see how she has tolerated the 12.5 mg once weekly dose. She was encouraged to reach out with any questions and/or concerns prior to our next follow-up.           Pharmacy Follow-Up: 07/15/2024  PCP Follow-Up: 07/18/2024    Iglesia Watson PharmD     Continue all meds under the continuation of care with the referring provider and clinical pharmacy team.

## 2024-07-03 ENCOUNTER — TELEPHONE (OUTPATIENT)
Dept: OBSTETRICS AND GYNECOLOGY | Facility: CLINIC | Age: 74
End: 2024-07-03

## 2024-07-03 DIAGNOSIS — N95.1 HOT FLASHES DUE TO MENOPAUSE: ICD-10-CM

## 2024-07-03 DIAGNOSIS — N95.1 MENOPAUSAL AND FEMALE CLIMACTERIC STATES: ICD-10-CM

## 2024-07-03 RX ORDER — ESTRADIOL 0.5 MG/1
0.5 TABLET ORAL DAILY
Qty: 90 TABLET | Refills: 3 | Status: SHIPPED | OUTPATIENT
Start: 2024-07-03

## 2024-07-03 RX ORDER — NORETHINDRONE 5 MG/1
5 TABLET ORAL DAILY
Qty: 90 TABLET | Refills: 0 | Status: SHIPPED | OUTPATIENT
Start: 2024-07-03

## 2024-07-14 DIAGNOSIS — I10 ESSENTIAL (PRIMARY) HYPERTENSION: ICD-10-CM

## 2024-07-15 ENCOUNTER — APPOINTMENT (OUTPATIENT)
Dept: PHARMACY | Facility: HOSPITAL | Age: 74
End: 2024-07-15
Payer: MEDICARE

## 2024-07-15 DIAGNOSIS — E66.3 OVERWEIGHT WITH BODY MASS INDEX (BMI) OF 29 TO 29.9 IN ADULT: Primary | ICD-10-CM

## 2024-07-15 RX ORDER — AMLODIPINE BESYLATE 5 MG/1
TABLET ORAL
Qty: 90 TABLET | Refills: 3 | Status: SHIPPED | OUTPATIENT
Start: 2024-07-15 | End: 2024-07-18 | Stop reason: ALTCHOICE

## 2024-07-15 NOTE — ASSESSMENT & PLAN NOTE
Mara Montoya has history of overweight BMI as evidenced by her starting BMI of 29.63 kg/m2 with weight comorbidities including hypertension. She saw Dr. Mckeon in-office last on 05/13/24 with weight of 60.3 kg and calculated BMI of 24.33 kg/m2 which is considered normal.   Since last pharmacy encounter she reports tolerating the Zepbound 12.5 mg once weekly well with no reported side effects. Still reports occasional constipation but states it has not worsened. She has already picked up a box of the 15 mg pens which was provided by Dr. Mckeon so she could find ahead of time at a pharmacy due to backorder.   CHANGE: Zepbound 15 mg under the skin once weekly (increased from 12.5 mg once weekly)  Medications are dosed appropriately per most recent renal and hepatic function.   We discussed maintenance therapy and moving forward with the medication. We discussed that stopping abruptly has been shown to lead to weight re-gain, however, we can help limit that by slowly titrating down her dose. I encouraged her to think about how she would like to move forward, at least for the next few months, so we can discuss at next follow-up.   We will follow-up in 4-weeks to ensure she has continued to tolerate Zepbound at the 15 mg once weekly dose. She was encouraged to reach out with any questions and/or concerns prior to our next follow-up.

## 2024-07-15 NOTE — PROGRESS NOTES
Patient is sent at the request of Liza Hebert M* for my opinion regarding weight management.  My final recommendations will be communicated back to the requesting provider by way of shared medical record.    Subjective     Mara Montoya is a 73 y.o. female who is overweight as evidenced by her most recent BMI of 24.33 kg/m2 as of 24 complicated by hyperlipidemia and hypertension. She was started on Wegovy 0.25 mg once weekly by Dr. Mckeon on 10/19/23. At this time her BMI was 29.63 kg/m2.     At last pharmacy visit patient's Zepbound was increased up to 12.5 mg once weekly. We are following-up today to see how she has tolerated the increased dose.     Due to backorder patient had been having trouble finding the medication so Dr. Mckeon had printed a hard copy prescription for the 15 mg strength for patient to take around to try and find it when she is ready to have it filled.     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    Past Surgical History:  She has a past surgical history that includes  section, classic (10/28/2013) and Knee surgery (10/28/2013).    Social History:  She reports that she has never smoked. She has never been exposed to tobacco smoke. She has never used smokeless tobacco. She reports current alcohol use of about 7.0 standard drinks of alcohol per week. She reports that she does not use drugs.    Family History:  Family History   Problem Relation Name Age of Onset    Hypertension Mother      Stroke Mother       Hypertension Father      Crohn's disease Brother      Macular degeneration Mother's Sister       Allergies:  Patient has no known allergies.    Current diet:   Breakfast: egg with toast, coffee  Lunch: tomato with a slice of bread   Dinner: salmon, sweet potatoes, green vegetables   Snacks: not really snacking, is does may have popcorn   Fluids: water, tea at night, has been working on increasing fluid intake   Will also have oranges occasionally   Has been eating more hardboiled eggs recently     Current exercise:   Has been working on increasing activity   Is using physical therapy exercises at home   Yoga once a week  Pilates once a week   Notes she has had some leg pain when doing a lot of cardio exercises so has cut back on this     Previous Attempts at Weight Loss:   Was in a program with [facility] with  and son to eat a plant based diet, states this has led to some of their current dietary habits  Has tried intermittent fasting   Has also tried the Mediterranean diet    Rentho scale - states that this works with an azucena on her phone to calculate BMI and give dietary recommendations     The patient does not have a known family history of diabetes.    Adverse Effects:   Still notes some mild constipation (takes Miralax ~1 time per week which helps)    Objective     Weight Management Pharmacotherapy:  Zepbound 12.5 mg under the skin once weekly     Historical Weight Management Pharmacotherapy:   Wegovy 1 mg (switched to Zepbound due to it being cheaper)    Pertinent PMH Review:  PMH of Pancreatitis: No  PMH of Retinopathy: No  PMH of MTC: No  PMH of MEN 2: No    Lab Review  Lab Results   Component Value Date    BILITOT 0.8 02/17/2024    CALCIUM 9.6 02/17/2024    CO2 27 02/17/2024     (H) 02/17/2024    CREATININE 0.75 02/17/2024    GLUCOSE 87 02/17/2024    ALKPHOS 31 (L) 02/17/2024    K 4.0 02/17/2024    PROT 6.1 (L) 02/17/2024     02/17/2024    AST 15 02/17/2024    ALT 15 02/17/2024    BUN 12  "02/17/2024    ANIONGAP 11 02/17/2024    MG 1.70 05/18/2021    PHOS 2.6 05/18/2021    ALBUMIN 4.2 02/17/2024    GFRF 89 07/14/2023     Lab Results   Component Value Date    TRIG 84 02/17/2024    CHOL 206 (H) 02/17/2024    LDLCALC 133 (H) 02/17/2024    HDL 56.0 02/17/2024     No results found for: \"HGBA1C\"  No components found for: \"UACR\"  The 10-year ASCVD risk score (Josi TONY, et al., 2019) is: 26.1%    Values used to calculate the score:      Age: 73 years      Sex: Female      Is Non- : No      Diabetic: Yes      Tobacco smoker: No      Systolic Blood Pressure: 123 mmHg      Is BP treated: Yes      HDL Cholesterol: 56 mg/dL      Total Cholesterol: 206 mg/dL    Drug Interactions:  No significant drug-drug interactions exist that require an adjustment to medication therapy.    Wt Readings from Last 6 Encounters:   05/13/24 60.3 kg (133 lb)   05/10/24 60.6 kg (133 lb 9.6 oz)   04/04/24 63.2 kg (139 lb 6.4 oz)   03/26/24 65.8 kg (145 lb)   01/12/24 69.9 kg (154 lb 3.2 oz)   11/03/23 71.7 kg (158 lb)     Estimated body mass index is 24.33 kg/m² as calculated from the following:    Height as of 5/13/24: 1.575 m (5' 2\").    Weight as of 5/13/24: 60.3 kg (133 lb).    Patient Reported Home Weights:   02/21/24: 146 lbs   03/18/24: 143 lbs   04/22/24: 134 lbs   06/17/24: 128 lbs (calculated BMI 23.4 kg/m2)  07/15/24: 120.2 lbs (calculated BMI 22 kg/m2)    Body Weight Reduction Since Therapy Initiation: ~21.9%    Patient Goals:   Weight reduction of at least > 5% after 12 weeks at highest tolerated dose     Assessment/Plan   Problem List Items Addressed This Visit       Overweight with body mass index (BMI) of 29 to 29.9 in adult - Primary     Mara Montoya has history of overweight BMI as evidenced by her starting BMI of 29.63 kg/m2 with weight comorbidities including hypertension. She saw Dr. Mckeon in-office last on 05/13/24 with weight of 60.3 kg and calculated BMI of 24.33 kg/m2 which is " considered normal.   Since last pharmacy encounter she reports tolerating the Zepbound 12.5 mg once weekly well with no reported side effects. Still reports occasional constipation but states it has not worsened. She has already picked up a box of the 15 mg pens which was provided by Dr. Mckeon so she could find ahead of time at a pharmacy due to backorder.   CHANGE: Zepbound 15 mg under the skin once weekly (increased from 12.5 mg once weekly)  Medications are dosed appropriately per most recent renal and hepatic function.   We discussed maintenance therapy and moving forward with the medication. We discussed that stopping abruptly has been shown to lead to weight re-gain, however, we can help limit that by slowly titrating down her dose. I encouraged her to think about how she would like to move forward, at least for the next few months, so we can discuss at next follow-up.   We will follow-up in 4-weeks to ensure she has continued to tolerate Zepbound at the 15 mg once weekly dose. She was encouraged to reach out with any questions and/or concerns prior to our next follow-up.          Relevant Orders    Follow Up In [facility] - Pharmacy     Pharmacy Follow-Up: 08/12/2024  PCP Follow-Up: 07/18/2024    Iglesia Watson PharmD     Continue all meds under the continuation of care with the referring provider and clinical pharmacy team.

## 2024-07-18 ENCOUNTER — APPOINTMENT (OUTPATIENT)
Dept: PRIMARY CARE | Facility: CLINIC | Age: 74
End: 2024-07-18
Payer: MEDICARE

## 2024-07-18 VITALS
DIASTOLIC BLOOD PRESSURE: 70 MMHG | BODY MASS INDEX: 22.26 KG/M2 | HEART RATE: 79 BPM | SYSTOLIC BLOOD PRESSURE: 132 MMHG | TEMPERATURE: 98 F | WEIGHT: 121 LBS | OXYGEN SATURATION: 98 % | HEIGHT: 62 IN | RESPIRATION RATE: 16 BRPM

## 2024-07-18 DIAGNOSIS — E78.2 MIXED HYPERLIPIDEMIA: ICD-10-CM

## 2024-07-18 DIAGNOSIS — I10 PRIMARY HYPERTENSION: ICD-10-CM

## 2024-07-18 DIAGNOSIS — M54.16 LUMBAR RADICULOPATHY: ICD-10-CM

## 2024-07-18 DIAGNOSIS — R01.1 HEART MURMUR: ICD-10-CM

## 2024-07-18 DIAGNOSIS — R09.81 SINUS CONGESTION: Primary | ICD-10-CM

## 2024-07-18 DIAGNOSIS — E03.8 OTHER SPECIFIED HYPOTHYROIDISM: ICD-10-CM

## 2024-07-18 DIAGNOSIS — I83.811 VARICOSE VEINS OF LOWER EXTREMITY WITH PAIN, RIGHT: ICD-10-CM

## 2024-07-18 PROBLEM — E11.9 TYPE 2 DIABETES MELLITUS WITHOUT COMPLICATION (MULTI): Status: RESOLVED | Noted: 2024-05-13 | Resolved: 2024-07-18

## 2024-07-18 PROCEDURE — 1123F ACP DISCUSS/DSCN MKR DOCD: CPT | Performed by: INTERNAL MEDICINE

## 2024-07-18 PROCEDURE — 1036F TOBACCO NON-USER: CPT | Performed by: INTERNAL MEDICINE

## 2024-07-18 PROCEDURE — 3075F SYST BP GE 130 - 139MM HG: CPT | Performed by: INTERNAL MEDICINE

## 2024-07-18 PROCEDURE — 1159F MED LIST DOCD IN RCRD: CPT | Performed by: INTERNAL MEDICINE

## 2024-07-18 PROCEDURE — 3078F DIAST BP <80 MM HG: CPT | Performed by: INTERNAL MEDICINE

## 2024-07-18 PROCEDURE — 99214 OFFICE O/P EST MOD 30 MIN: CPT | Performed by: INTERNAL MEDICINE

## 2024-07-18 PROCEDURE — 1160F RVW MEDS BY RX/DR IN RCRD: CPT | Performed by: INTERNAL MEDICINE

## 2024-07-18 PROCEDURE — 3008F BODY MASS INDEX DOCD: CPT | Performed by: INTERNAL MEDICINE

## 2024-07-18 PROCEDURE — G2211 COMPLEX E/M VISIT ADD ON: HCPCS | Performed by: INTERNAL MEDICINE

## 2024-07-18 RX ORDER — AMLODIPINE BESYLATE 2.5 MG/1
2.5 TABLET ORAL DAILY
Qty: 30 TABLET | Refills: 5 | Status: SHIPPED | OUTPATIENT
Start: 2024-07-18 | End: 2025-01-14

## 2024-07-18 RX ORDER — GABAPENTIN 100 MG/1
100 CAPSULE ORAL 3 TIMES DAILY
Qty: 90 CAPSULE | Refills: 1 | Status: SHIPPED | OUTPATIENT
Start: 2024-07-18 | End: 2024-09-16

## 2024-07-18 RX ORDER — AMLODIPINE BESYLATE 5 MG/1
5 TABLET ORAL DAILY
Qty: 90 TABLET | Refills: 3 | Status: SHIPPED | OUTPATIENT
Start: 2024-07-18 | End: 2024-07-18 | Stop reason: ALTCHOICE

## 2024-07-18 RX ORDER — AZELASTINE 1 MG/ML
1 SPRAY, METERED NASAL 2 TIMES DAILY
Qty: 30 ML | Refills: 1 | Status: SHIPPED | OUTPATIENT
Start: 2024-07-18 | End: 2024-09-16

## 2024-07-18 ASSESSMENT — ENCOUNTER SYMPTOMS
EYES NEGATIVE: 1
BLOOD IN STOOL: 0
EYE PAIN: 0
DIFFICULTY URINATING: 0
SORE THROAT: 0
SEIZURES: 0
EYE REDNESS: 0
CONSTITUTIONAL NEGATIVE: 1
FLANK PAIN: 0
SINUS PAIN: 0
GASTROINTESTINAL NEGATIVE: 1
NAUSEA: 0
DIARRHEA: 0
APPETITE CHANGE: 0
SHORTNESS OF BREATH: 0
JOINT SWELLING: 0
ACTIVITY CHANGE: 0
ARTHRALGIAS: 0
PSYCHIATRIC NEGATIVE: 1
NECK STIFFNESS: 0
UNEXPECTED WEIGHT CHANGE: 0
COLOR CHANGE: 0
SINUS PRESSURE: 0
TREMORS: 0
WOUND: 0
MYALGIAS: 0
NUMBNESS: 0
WHEEZING: 0
ENDOCRINE NEGATIVE: 1
PALPITATIONS: 0
FACIAL ASYMMETRY: 0
BRUISES/BLEEDS EASILY: 0
CONSTIPATION: 0
SLEEP DISTURBANCE: 0
ADENOPATHY: 0
TROUBLE SWALLOWING: 0
DYSURIA: 0
EYE DISCHARGE: 0
SPEECH DIFFICULTY: 0
LIGHT-HEADEDNESS: 0
CARDIOVASCULAR NEGATIVE: 1
ABDOMINAL PAIN: 0
RESPIRATORY NEGATIVE: 1
ALLERGIC/IMMUNOLOGIC NEGATIVE: 1
BACK PAIN: 1
HEMATOLOGIC/LYMPHATIC NEGATIVE: 1
POLYPHAGIA: 0
CONFUSION: 0
VOICE CHANGE: 0
HALLUCINATIONS: 0
POLYDIPSIA: 0
COUGH: 0
HEADACHES: 0
VOMITING: 0
AGITATION: 0
NERVOUS/ANXIOUS: 0
CHEST TIGHTNESS: 0
STRIDOR: 0
WEAKNESS: 0
NECK PAIN: 0
FREQUENCY: 0
DIZZINESS: 0

## 2024-07-18 NOTE — PROGRESS NOTES
Subjective   Patient ID: Mara Montoya is a 73 y.o. female who presents for Follow-up (Pt is here due to a 3 month follow up sinus issues ).  HPI      This is 72 yo female with complex medical problems including HTN, HLD, Hypothyroidism, osteopenia, back pain, lumbar spondylosis, right leg pain, varicose veins.    We reviewed and discussed her medical conditions during today's visit.    Patient has been feeling pretty good and has been complaint with prescribed medications.    We reviewed and discussed details of recent blood work: CBC, CMP, TSH, Lipid panel, Hb A1c, Vit D done in  Feb 2024.  Results within acceptable range except mildly elevated cholesterol.     C/o sinus congestion which usually is worse in am, despite using Fluticasone nasal spray.  Will refer to ENT for further evaluation. In the meantime patient may try Astelin nasal spray in addition to Fluticasone.    Patient has been monitoring her BP at home and noted low BP readings  which correlate with recent weight loss.  Will reduce amlodipine to 2.5 mg daily.     She has been tolerating treatment with Zepbound well, except occasional constipation which she has been managing with diet adjustments.  Intentionally lost about 30 lbs.      Patient has been following with [facility] pharmacist, I have been communicating with pharmacist regularly and supervising treatment with Zepbound.     She has been exercising regularly at [facility].     Still c/o right lower leg pain when walking.  Patient sees pain management Dr. Bradley.  Received diagnostic nerve block in March 2024.  No significant improvement noted.    Will try Gabapentin. Start with 100 mg and slowly increase to 300 mg daily.        Saw vascular surgeon Dr. Hoffman, advised compression stockings.   May consider sclerotherapy.  Concerned about heart murmur which was recently detected during vascular evaluation.  Will arrange echocardiogram for further evaluation.     Review of Systems  "  Constitutional: Negative.  Negative for activity change, appetite change and unexpected weight change.   HENT:  Positive for congestion. Negative for ear discharge, ear pain, hearing loss, mouth sores, nosebleeds, sinus pressure, sinus pain, sore throat, trouble swallowing and voice change.    Eyes: Negative.  Negative for pain, discharge, redness and visual disturbance.   Respiratory: Negative.  Negative for cough, chest tightness, shortness of breath, wheezing and stridor.    Cardiovascular: Negative.  Negative for chest pain, palpitations and leg swelling.   Gastrointestinal: Negative.  Negative for abdominal pain, blood in stool, constipation, diarrhea, nausea and vomiting.   Endocrine: Negative.  Negative for polydipsia, polyphagia and polyuria.   Genitourinary: Negative.  Negative for difficulty urinating, dysuria, flank pain, frequency and urgency.   Musculoskeletal:  Positive for back pain and gait problem. Negative for arthralgias, joint swelling, myalgias, neck pain and neck stiffness.   Skin: Negative.  Negative for color change, rash and wound.   Allergic/Immunologic: Negative.  Negative for environmental allergies, food allergies and immunocompromised state.   Neurological:  Negative for dizziness, tremors, seizures, syncope, facial asymmetry, speech difficulty, weakness, light-headedness, numbness and headaches.   Hematological: Negative.  Negative for adenopathy. Does not bruise/bleed easily.   Psychiatric/Behavioral: Negative.  Negative for agitation, behavioral problems, confusion, hallucinations, sleep disturbance and suicidal ideas. The patient is not nervous/anxious.    All other systems reviewed and are negative.      Objective     Review of systems was performed and all systems were negative except what in HPI    /70 (BP Location: Left arm, Patient Position: Sitting, BP Cuff Size: Adult)   Pulse 79   Temp 36.7 °C (98 °F) (Temporal)   Resp 16   Ht 1.575 m (5' 2\")   Wt 54.9 kg (121 " lb)   SpO2 98%   BMI 22.13 kg/m²    Physical Exam  Vitals and nursing note reviewed.   Constitutional:       General: She is not in acute distress.     Appearance: Normal appearance.   HENT:      Head: Normocephalic and atraumatic.      Right Ear: External ear normal.      Left Ear: External ear normal.      Nose: Nose normal. No congestion or rhinorrhea.   Eyes:      General:         Right eye: No discharge.         Left eye: No discharge.      Extraocular Movements: Extraocular movements intact.      Conjunctiva/sclera: Conjunctivae normal.      Pupils: Pupils are equal, round, and reactive to light.   Cardiovascular:      Rate and Rhythm: Normal rate and regular rhythm.      Pulses: Normal pulses.      Heart sounds: Murmur heard.      Systolic murmur is present with a grade of 1/6.      No friction rub. No gallop.   Pulmonary:      Effort: Pulmonary effort is normal. No respiratory distress.      Breath sounds: Normal breath sounds. No stridor. No wheezing, rhonchi or rales.   Chest:      Chest wall: No tenderness.   Abdominal:      General: Bowel sounds are normal.      Palpations: Abdomen is soft. There is no mass.      Tenderness: There is no abdominal tenderness. There is no guarding or rebound.   Musculoskeletal:         General: No swelling or deformity. Normal range of motion.      Cervical back: Normal range of motion and neck supple. No rigidity or tenderness.      Right lower leg: No edema.      Left lower leg: No edema.   Lymphadenopathy:      Cervical: No cervical adenopathy.   Skin:     General: Skin is warm and dry.      Coloration: Skin is not jaundiced.      Findings: No bruising or erythema.   Neurological:      General: No focal deficit present.      Mental Status: She is alert and oriented to person, place, and time. Mental status is at baseline.      Cranial Nerves: No cranial nerve deficit.      Motor: No weakness.      Coordination: Coordination normal.      Gait: Gait normal.    Psychiatric:         Mood and Affect: Mood normal.         Behavior: Behavior normal.         Thought Content: Thought content normal.         Judgment: Judgment normal.         Assessment/Plan   Problem List Items Addressed This Visit             ICD-10-CM       Cardiac and Vasculature    Varicose veins of lower extremity with pain, right I83.811     Consult vascular surgery         Mixed hyperlipidemia E78.2     Low cholesterol and low carbohydrate diet is advised.          Primary hypertension I10     Will lower Amlodipine to 2.5 mg daily and monitor BP closely.          Relevant Medications    amLODIPine (Norvasc) 2.5 mg tablet    Heart murmur R01.1    Relevant Orders    Transthoracic Echo (TTE) Complete       ENT    Sinus congestion - Primary R09.81    Relevant Medications    azelastine (Astelin) 137 mcg (0.1 %) nasal spray    Other Relevant Orders    Referral to ENT       Endocrine/Metabolic    Other specified hypothyroidism E03.8     Clinically stable. F/up with endocrinology.             Neuro    Lumbar radiculopathy M54.16     Start gabapentin as discussed.         Relevant Medications    gabapentin (Neurontin) 100 mg capsule     It was a pleasure to see you today.  I would like to remind you about importance of a healthy lifestyle in order to improve your well-being and live longer.  Try to engage in physical activities for at least 150 minutes per week.  Eat about 10 servings of fruits and vegetables daily. My advice is 2 servings of fruits and 8 servings of vegetables.  For vegetables choose at least half of them green and at least half of them fresh.  Please avoid sugar, salt, fried food and saturated fat.    I spent a total of 38 minutes on the date of service for follow up visit, which included preparing to see the patient, face-to-face patient care, completing clinical documentation, obtaining and/or reviewing separately obtained history, performing a medically appropriate examination, counseling  and educating the patient/family/caregiver, ordering medications, tests, or procedures, communicating with other health care providers (not separately reported), independently interpreting results (not separately reported), communicating results to the patient/family/caregiver, and care coordination (not separately reported).      F/up in 3 months or sooner if needed.

## 2024-07-18 NOTE — ASSESSMENT & PLAN NOTE
Please maintain enough calcium in your diet:  4792-8851 mg daily (consume foods rich in calcium rather than taking only calcium supplement to meet daily calcium requirements), take daily Vitamin D supplement with meal. Average Vit D dose is 2000 international units daily.  Weight bearing exercise routine is recommended.

## 2024-08-08 DIAGNOSIS — H91.90 HEARING DIFFICULTY, UNSPECIFIED LATERALITY: Primary | ICD-10-CM

## 2024-08-08 DIAGNOSIS — E66.3 OVERWEIGHT WITH BODY MASS INDEX (BMI) OF 29 TO 29.9 IN ADULT: ICD-10-CM

## 2024-08-12 ENCOUNTER — APPOINTMENT (OUTPATIENT)
Dept: PHARMACY | Facility: HOSPITAL | Age: 74
End: 2024-08-12
Payer: MEDICARE

## 2024-08-12 DIAGNOSIS — E66.3 OVERWEIGHT WITH BODY MASS INDEX (BMI) OF 29 TO 29.9 IN ADULT: Primary | ICD-10-CM

## 2024-08-12 RX ORDER — NORETHINDRONE 5 MG/1
5 TABLET ORAL DAILY
COMMUNITY

## 2024-08-12 NOTE — PROGRESS NOTES
Patient is sent at the request of Liza Hebert M* for my opinion regarding weight management.  My final recommendations will be communicated back to the requesting provider by way of shared medical record.    Subjective     Mara Montoya is a 74 y.o. female who is overweight as evidenced by her most recent BMI of 24.33 kg/m2 as of 24 complicated by hyperlipidemia and hypertension. She was started on Wegovy 0.25 mg once weekly by Dr. Mckeon on 10/19/23. At this time her BMI was 29.63 kg/m2.     At last pharmacy visit patient's Zepbound was increased up to 15 mg once weekly. Patient had reached out to Dr. Mckeon via [employer] message last week to see if she can reduce her Zepbound to 15 mg every 10 days. Dr Mckeon recommended reducing down to 12.5 mg once weekly instead. We are following-up today to review patient's continued weight loss and discuss reducing the dose down to 12.5 mg once weekly.     Did note welts/hives from gabapentin, has since stopped taking it.     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    Past Surgical History:  She has a past surgical history that includes  section, classic (10/28/2013) and Knee surgery (10/28/2013).    Social History:  She reports that she has never smoked. She has never been exposed to tobacco smoke. She has never used smokeless tobacco. She reports current alcohol use of about 7.0 standard drinks of alcohol per week. She reports that she does not use drugs.    Family History:  Family History    Problem Relation Name Age of Onset    Hypertension Mother      Stroke Mother      Hypertension Father      Crohn's disease Brother      Macular degeneration Mother's Sister       Allergies:  Gabapentin    Current diet:   Breakfast: egg with toast, coffee  Lunch: tomato with a slice of bread   Dinner: salmon, sweet potatoes, green vegetables   Snacks: not really snacking, is does may have popcorn   Fluids: water, tea at night, has been working on increasing fluid intake   Will also have oranges occasionally   Has been eating more hardboiled eggs recently     Current exercise:   Has been working on increasing activity   Is using physical therapy exercises at home   Yoga once a week  Pilates once a week   Notes she has had some leg pain when doing a lot of cardio exercises so has cut back on this     Previous Attempts at Weight Loss:   Was in a program with [facility] with  and son to eat a plant based diet, states this has led to some of their current dietary habits  Has tried intermittent fasting   Has also tried the Mediterranean diet    Rentho scale - states that this works with an azucena on her phone to calculate BMI and give dietary recommendations     The patient does not have a known family history of diabetes.    Adverse Effects:   Still notes some mild constipation (takes Miralax ~1 time per week which helps)    Objective     Weight Management Pharmacotherapy:  Zepbound 15 mg under the skin once weekly (has been taking every 10 days since her last dose)    Historical Weight Management Pharmacotherapy:   Wegovy 1 mg (switched to Zepbound due to it being cheaper)    Pertinent PMH Review:  PMH of Pancreatitis: No  PMH of Retinopathy: No  PMH of MTC: No  PMH of MEN 2: No    Lab Review  Lab Results   Component Value Date    BILITOT 0.8 02/17/2024    CALCIUM 9.6 02/17/2024    CO2 27 02/17/2024     (H) 02/17/2024    CREATININE 0.75 02/17/2024    GLUCOSE 87 02/17/2024    ALKPHOS 31 (L) 02/17/2024    K 4.0  "02/17/2024    PROT 6.1 (L) 02/17/2024     02/17/2024    AST 15 02/17/2024    ALT 15 02/17/2024    BUN 12 02/17/2024    ANIONGAP 11 02/17/2024    MG 1.70 05/18/2021    PHOS 2.6 05/18/2021    ALBUMIN 4.2 02/17/2024    GFRF 89 07/14/2023     Lab Results   Component Value Date    TRIG 84 02/17/2024    CHOL 206 (H) 02/17/2024    LDLCALC 133 (H) 02/17/2024    HDL 56.0 02/17/2024     No results found for: \"HGBA1C\"  No components found for: \"UACR\"  The 10-year ASCVD risk score (Josi TONY, et al., 2019) is: 32.3%    Values used to calculate the score:      Age: 74 years      Sex: Female      Is Non- : No      Diabetic: Yes      Tobacco smoker: No      Systolic Blood Pressure: 132 mmHg      Is BP treated: Yes      HDL Cholesterol: 56 mg/dL      Total Cholesterol: 206 mg/dL    Drug Interactions:  No significant drug-drug interactions exist that require an adjustment to medication therapy.    Wt Readings from Last 6 Encounters:   07/18/24 54.9 kg (121 lb)   05/13/24 60.3 kg (133 lb)   05/10/24 60.6 kg (133 lb 9.6 oz)   04/04/24 63.2 kg (139 lb 6.4 oz)   03/26/24 65.8 kg (145 lb)   01/12/24 69.9 kg (154 lb 3.2 oz)     Estimated body mass index is 22.13 kg/m² as calculated from the following:    Height as of 7/18/24: 1.575 m (5' 2\").    Weight as of 7/18/24: 54.9 kg (121 lb).    Patient Reported Home Weights:   02/21/24: 146 lbs   03/18/24: 143 lbs   04/22/24: 134 lbs   06/17/24: 128 lbs (calculated BMI 23.4 kg/m2)  07/15/24: 120.2 lbs (calculated BMI 22 kg/m2)  08/12/24: 119 lbs (calculated BMI 21.8 kg/m2)    Assessment/Plan   Problem List Items Addressed This Visit       Overweight with body mass index (BMI) of 29 to 29.9 in adult - Primary     Current regimen includes Zepbound 15 mg under the skin every 10 days (patient has reduced down to every 10 days from every 7 days). Patient's current weight reported as 119 lbs. Starting weight was 162 lbs on 10/19/23. Has lost 43 lbs (~26.5% of TBW) since " starting therapy plan. Patient feels she has reached her goal weight now and wants to start to pull back on her dose.     She states she discussed reducing taking the 15 mg once weekly dose down to taking every 10 days and started that with her last dose. I explained to patient that this may cause the effects to wear off significantly, increasing risk of weight gain due to significant increase in hunger. It would be preferred to reduce the dose down to 12.5 mg once weekly and keep it at every 7 day dosing and then continue to reduce down each month to see at which lowest dose patient can maintain weight loss. Patient would like to continue taking the 15 mg every 10 days for now but will reduce to 12.5 mg once she has completed her last 2 doses.     Medication Changes:  CONTINUE:  Zepbound 15 mg under the skin every 10 days (she has 2 doses remaining at home)    Patient will reduce down to 12.5 mg after she completes he last 15 mg pens. New prescription sent to [facility] for them to attempt to fill for patient when product is available.     Future Considerations:  Continue to titrate down monthly as long as patient is able to maintain her weight loss with each titration. If she notices any weight gain we can increase her dose back up to the dose she was at prior.     Monitoring and Education:  Patient educated that ideally Zepbound should be dosed at once weekly instead of every 10 days, however she would like to continue it at every 10 days and see how she does  She was informed effects may wear off around day 7-8 and she may note significant increased hunger the other days  We will continue to monitor her weight and for any new/worsening side effects at each encounter    We will plan to follow-up in 4-weeks to see how she has done taking a 15 mg pen every 10 days and to discuss continuing to titrate her dose down as tolerated. She was encouraged to reach out with any questions and/or concerns.             Relevant Medications    tirzepatide, weight loss, (Zepbound) 12.5 mg/0.5 mL injection    Other Relevant Orders    Follow Up In [facility] - Pharmacy     Pharmacy Follow-Up: 09/09/2024   PCP Follow-Up: 10/22/2024    Iglesia Watson, PharmD     Continue all meds under the continuation of care with the referring provider and clinical pharmacy team.

## 2024-08-12 NOTE — ASSESSMENT & PLAN NOTE
Current regimen includes Zepbound 15 mg under the skin every 10 days (patient has reduced down to every 10 days from every 7 days). Patient's current weight reported as 119 lbs. Starting weight was 162 lbs on 10/19/23. Has lost 43 lbs (~26.5% of TBW) since starting therapy plan. Patient feels she has reached her goal weight now and wants to start to pull back on her dose.     She states she discussed reducing taking the 15 mg once weekly dose down to taking every 10 days and started that with her last dose. I explained to patient that this may cause the effects to wear off significantly, increasing risk of weight gain due to significant increase in hunger. It would be preferred to reduce the dose down to 12.5 mg once weekly and keep it at every 7 day dosing and then continue to reduce down each month to see at which lowest dose patient can maintain weight loss. Patient would like to continue taking the 15 mg every 10 days for now but will reduce to 12.5 mg once she has completed her last 2 doses.     Medication Changes:  CONTINUE:  Zepbound 15 mg under the skin every 10 days (she has 2 doses remaining at home)    Patient will reduce down to 12.5 mg after she completes he last 15 mg pens. New prescription sent to [facility] for them to attempt to fill for patient when product is available.     Future Considerations:  Continue to titrate down monthly as long as patient is able to maintain her weight loss with each titration. If she notices any weight gain we can increase her dose back up to the dose she was at prior.     Monitoring and Education:  Patient educated that ideally Zepbound should be dosed at once weekly instead of every 10 days, however she would like to continue it at every 10 days and see how she does  She was informed effects may wear off around day 7-8 and she may note significant increased hunger the other days  We will continue to monitor her weight and for any new/worsening side effects  at each encounter    We will plan to follow-up in 4-weeks to see how she has done taking a 15 mg pen every 10 days and to discuss continuing to titrate her dose down as tolerated. She was encouraged to reach out with any questions and/or concerns.

## 2024-08-13 ENCOUNTER — HOSPITAL ENCOUNTER (OUTPATIENT)
Dept: CARDIOLOGY | Facility: CLINIC | Age: 74
Discharge: HOME | End: 2024-08-13
Payer: MEDICARE

## 2024-08-13 DIAGNOSIS — R01.1 HEART MURMUR: ICD-10-CM

## 2024-08-13 PROCEDURE — 93306 TTE W/DOPPLER COMPLETE: CPT

## 2024-08-13 PROCEDURE — 93306 TTE W/DOPPLER COMPLETE: CPT | Performed by: INTERNAL MEDICINE

## 2024-08-14 ENCOUNTER — APPOINTMENT (OUTPATIENT)
Dept: OTOLARYNGOLOGY | Facility: CLINIC | Age: 74
End: 2024-08-14
Payer: MEDICARE

## 2024-08-14 VITALS — BODY MASS INDEX: 20.8 KG/M2 | HEIGHT: 63 IN | WEIGHT: 117.4 LBS

## 2024-08-14 DIAGNOSIS — J31.0 CHRONIC RHINITIS: ICD-10-CM

## 2024-08-14 DIAGNOSIS — J34.89 NASAL CONGESTION WITH RHINORRHEA: ICD-10-CM

## 2024-08-14 DIAGNOSIS — R09.89 THROAT CLEARING: ICD-10-CM

## 2024-08-14 DIAGNOSIS — R44.8 FACIAL PRESSURE: ICD-10-CM

## 2024-08-14 DIAGNOSIS — R09.81 SINUS CONGESTION: ICD-10-CM

## 2024-08-14 DIAGNOSIS — R09.81 NASAL CONGESTION WITH RHINORRHEA: ICD-10-CM

## 2024-08-14 DIAGNOSIS — R09.82 POST-NASAL DRAINAGE: Primary | ICD-10-CM

## 2024-08-14 LAB
AORTIC VALVE MEAN GRADIENT: 3.1 MMHG
AORTIC VALVE PEAK VELOCITY: 1.24 M/S
AV PEAK GRADIENT: 6.2 MMHG
AVA (PEAK VEL): 2.75 CM2
AVA (VTI): 2.76 CM2
EJECTION FRACTION APICAL 4 CHAMBER: 65.2
EJECTION FRACTION: 62 %
LEFT ATRIUM VOLUME AREA LENGTH INDEX BSA: 28.2 ML/M2
LEFT VENTRICLE INTERNAL DIMENSION DIASTOLE: 3.68 CM (ref 3.5–6)
LEFT VENTRICULAR OUTFLOW TRACT DIAMETER: 2.01 CM
MITRAL VALVE E/A RATIO: 0.78
RIGHT VENTRICLE FREE WALL PEAK S': 10 CM/S
TRICUSPID ANNULAR PLANE SYSTOLIC EXCURSION: 3.2 CM

## 2024-08-14 PROCEDURE — 3008F BODY MASS INDEX DOCD: CPT | Performed by: OTOLARYNGOLOGY

## 2024-08-14 PROCEDURE — 1126F AMNT PAIN NOTED NONE PRSNT: CPT | Performed by: OTOLARYNGOLOGY

## 2024-08-14 PROCEDURE — 99204 OFFICE O/P NEW MOD 45 MIN: CPT | Performed by: OTOLARYNGOLOGY

## 2024-08-14 PROCEDURE — 1160F RVW MEDS BY RX/DR IN RCRD: CPT | Performed by: OTOLARYNGOLOGY

## 2024-08-14 PROCEDURE — 1123F ACP DISCUSS/DSCN MKR DOCD: CPT | Performed by: OTOLARYNGOLOGY

## 2024-08-14 PROCEDURE — 31231 NASAL ENDOSCOPY DX: CPT | Performed by: OTOLARYNGOLOGY

## 2024-08-14 PROCEDURE — 1159F MED LIST DOCD IN RCRD: CPT | Performed by: OTOLARYNGOLOGY

## 2024-08-14 ASSESSMENT — PAIN SCALES - GENERAL: PAINLEVEL: 0-NO PAIN

## 2024-08-14 NOTE — LETTER
August 22, 2024     Liza Hebert MD PhD  [address]  [facility], [address]  [facility] [id number]    Patient: Mara Montoya   YOB: 1950   Date of Visit: 8/14/2024       Dear Dr. Liza Hebert MD PhD:    Thank you for referring Mara Montoya to me for evaluation. Below are my notes for this consultation.  If you have questions, please do not hesitate to call me. I look forward to following your patient along with you.       Sincerely,     Ed Dupree MD      CC: No Recipients  ______________________________________________________________________________________    Chief Complaint:  Sinus problems     History Of Present Illness:    Mara Montoya presents as a new patient to me with sinus issues.    She has chronic postnasal drainage regardless of whether or not she is infected and she has been able to manage this previously with Nasonex.  A few months ago, she had a bad sinus infection and was treated with some benefit but not resolution.  She discontinued the Nasonex at that time and has been using Flonase.    She has had persistence of postnasal drainage, voice changes, pressure in her head.  The pressure is centered in her forehead.    Main Symptoms:  Patient has anterior nasal drainage.  Off and on.   Patient has posterior nasal drainage.  More of a problem.   Patient has nasal airway obstruction.  Can be an issue at night.   Patient does not have facial pain.  Patient has facial pressure.  Off and on.   Patient does not have decreased sense of smell.   Associated Symptoms:  Patient does not have headaches.  Patient has throat clearing.  Intermittent.   Patient has coughing.  Intermittent.   Patient has dysphonia.  High pitched ? Because of PND  Patient has sneezing.  Off and on.   Patient does not have nasal bleeding.    Medications currently on for sinonasal symptoms:  Flonase 2 puffs each side BID  Medications tried in the past for sinonasal  symptoms:  Amoxicillin.      Other Pertinent Medical Conditions:  Patient does not have asthma  Patient does not have aspirin sensitivity.  Patient does not have migraines.  Patient does not have history of allergy testing.   Patient does not have history of sinus surgery.  Patient does not have history of nasal fracture.  Patient does not have heartburn  Patient does not have therapy for heartburn.  Patient does not have imaging of sinuses.     Active Problems:  Patient Active Problem List   Diagnosis   • Varicose veins of lower extremity with pain, right   • Other specified hypothyroidism   • Mixed hyperlipidemia   • Lipoma of right axilla   • Primary hypertension   • Pain in right lower leg   • Erythrocytosis   • Menopausal symptoms   • Acute left otitis media   • Appendiceal mucinous cystadenoma   • Blood pressure elevated without history of HTN   • Cataract   • Closed fracture of base of fifth metatarsal bone of left foot with delayed healing   • Closed fracture of base of fifth metatarsal bone   • Colonic mass   • Combined form of senile cataract of both eyes   • DDD (degenerative disc disease), lumbosacral   • Fungal nail infection   • Iliotibial band friction syndrome of both knees   • Kyphosis (acquired) (postural)   • Lumbar spondylosis   • Migraine with aura and without status migrainosus, not intractable   • Lumbosacral radiculitis   • Myopia of both eyes   • Myopia with astigmatism and presbyopia   • Sinus congestion   • Neuroma of left lower extremity   • Nevus of left conjunctiva   • Osteopenia   • Lumbar radiculopathy   • Pinguecula of both eyes   • Post-menopausal bleeding   • Primary osteoarthritis of right hip   • Plantar fasciitis, left   • Rash and nonspecific skin eruption   • Segmental and somatic dysfunction   • Sprain and strain of interphalangeal (joint) of hand   • Submucosal colonic lesion   • Tendonitis   • Varicose veins of both lower extremities with pain   • Vision changes   • Vitamin  D deficiency   • Overweight with body mass index (BMI) of 29 to 29.9 in adult   • Cold sore   • Sinusitis   • Impacted cerumen of right ear   • Ear ache   • Hearing difficulty   • Heart murmur      Past Medical History:  She has a past medical history of Asymptomatic menopausal state (2018), Asymptomatic menopausal state (2018), Pain in right foot (2016), Personal history of other endocrine, nutritional and metabolic disease, Superficial foreign body of unspecified parts of thorax, initial encounter (2015), Superficial foreign body of unspecified parts of thorax, initial encounter (2015), and 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    Surgical History:  She has a past surgical history that includes  section, classic (10/28/2013) and Knee surgery (10/28/2013).     Family History:  Family History   Problem Relation Name Age of Onset   • Hypertension Mother     • Stroke Mother     • Hypertension Father     • Crohn's disease Brother     • Macular degeneration Mother's Sister       Social History:  She reports that she has never smoked. She has never been exposed to tobacco smoke. She has never used smokeless tobacco. She reports current alcohol use of about 7.0 standard drinks of alcohol per week. She reports that she does not use drugs.     Allergies:  Gabapentin    Current Meds:    Current Outpatient Medications:   •  amLODIPine (Norvasc) 2.5 mg tablet, Take 1 tablet (2.5 mg) by mouth once daily., Disp: 30 tablet, Rfl: 5  •  estradiol (Estrace) 0.5 mg tablet, TAKE 1 TABLET BY MOUTH EVERY DAY, Disp: 90 tablet, Rfl: 3  •  fluticasone (Flonase) 50 mcg/actuation nasal spray, INHALE 1 SPRAY IN EACH NOSTRIL TWICE DAILY, Disp: 48 mL, Rfl: 2  •  levothyroxine (Synthroid, Levoxyl) 125 mcg tablet, TAKE 1 TABLET BY MOUTH EVERY DAY, Disp: 90 tablet, Rfl: 2  •  liothyronine (Cytomel) 5 mcg tablet, TAKE 2 TABLETS BY MOUTH EVERY DAY, Disp: 180 tablet, Rfl: 3  •   "norethindrone (Aygestin) 5 mg tablet, Take 1 tablet (5 mg) by mouth once daily., Disp: , Rfl:   •  tirzepatide, weight loss, (Zepbound) 12.5 mg/0.5 mL injection, Inject 12.5 mg under the skin every 7 days., Disp: 2 mL, Rfl: 0  •  tretinoin (Retin-A) 0.1 % cream, Apply 1 Application topically., Disp: , Rfl:   •  valACYclovir (Valtrex) 1 gram tablet, TAKE 2 TABLET BY MOUTH EVERY TWELVE HOURS AS NEEDED FOR 1 DAY, REPEAT IF NEEDED, Disp: 24 tablet, Rfl: 1  •  azelastine (Astelin) 137 mcg (0.1 %) nasal spray, Administer 1 spray into each nostril 2 times a day. (Patient not taking: Reported on 8/14/2024), Disp: 30 mL, Rfl: 1    Vitals:  Visit Vitals  Ht 1.6 m (5' 3\")   Wt 53.3 kg (117 lb 6.4 oz)   BMI 20.80 kg/m²   OB Status Menopausal   Smoking Status Never   BSA 1.54 m²      Physical Exam:  CONSTITUTIONAL:  Vitals reviewed in nursing chart, well developed, well nourished.    RESPIRATION:  Breathing comfortably, no stridor.  CV:  No clubbing/cyanosis/edema in hands.  EYES:  EOM Intact, sclera normal.  NEURO:  Alert and oriented times 3, Cranial nerves 2-12 intact and symmetric bilaterally.  HEAD AND FACE:  Skin with no masses or lesions, sinuses nontender to palpation.  SALIVARY GLANDS:  Parotid and submandibular glands normal bilaterally.  EARS:  Normal external ears, external auditory canals, and TMs to otoscopy, normal hearing to whispered voice.  NOSE:  External nose midline, anterior rhinoscopy is normal with limited visualization to the anterior aspect of the interior turbinates (see nasal endoscopy).  ORAL CAVITY/OROPHARYNX/LIPS:  Normal mucous membranes, normal floor of mouth/tongue/OP, no masses or lesions are noted.  PHARYNGEAL WALLS AND NASOPHARYNX:  No masses noted.  NECK/LYMPH:  No LAD, no thyroid masses.  LARYNX:  No lesions noted.  Normal vocal fold mobility bilateral.    SINONASAL ENDOSCOPY (CPT 46630):  To better evaluate the patient's symptoms, sinonasal endoscopy is indicated.  After discussion of " risks and benefits, and topical decongestion and anesthesia, an endoscope was used to perform nasal endoscopy on each side.  A time out identifying the patient, the procedure, the location of the procedure and any concerns was performed prior to beginning the procedure.    Findings:  Examination of the right nasal cavity revealed no evidence of purulence or polyposis at the middle meatus or sphenoethmoid recess.  Examination of the left nasal cavity revealed no evidence of purulence or polyposis at the middle meatus or sphenoethmoid recess.  Mild anterior nasal cavity dryness bilaterally this is consistent with topical steroid use.    Results/Data:  I personally reviewed the last note from otolaryngology from Dr. Jerry May 13, 2024.  She was being seen at that time for ear related issues.    I reviewed a note from her primary care provider from 8/10/2024 and at that time she was referred to ENT and was prescribed amoxicillin for sinusitis.    Provider Impressions:  1.  Rhinorrhea  2.  Nasal airway obstruction  3.  Facial pressure  4.  Throat clearing, coughing, dysphonia    Discussion:  Mara Montoya and I discussed options including trials of additional intranasal medical therapy, allergy assessment, or imaging of her sinuses.  She has used Nasonex and Flonase without resolution of her current symptoms.    Specifically in regard to imaging, I think there would be some value there given that her symptoms progressed after a bad sinus infection and I did not see anything concerning on nasal endoscopy today.  This would allow us to understand if there is an inflammatory and not necessarily infectious issue in her sinuses causing her symptoms.  She was comfortable moving forward with imaging and this was ordered today.  I asked her to coordinate a virtual visit with me after completion of the imaging to review those findings and formulate next steps.  Given that she has just recently completed amoxicillin and there  was no evidence of infection I would not provide additional antibiotic at this time.  All questions were answered.    Signature:  Ed Dupree MD.

## 2024-08-14 NOTE — PROGRESS NOTES
Chief Complaint:  Sinus problems     History Of Present Illness:    Mara Montoya presents as a new patient to me with sinus issues.    She has chronic postnasal drainage regardless of whether or not she is infected and she has been able to manage this previously with Nasonex.  A few months ago, she had a bad sinus infection and was treated with some benefit but not resolution.  She discontinued the Nasonex at that time and has been using Flonase.    She has had persistence of postnasal drainage, voice changes, pressure in her head.  The pressure is centered in her forehead.    Main Symptoms:  Patient has anterior nasal drainage.  Off and on.   Patient has posterior nasal drainage.  More of a problem.   Patient has nasal airway obstruction.  Can be an issue at night.   Patient does not have facial pain.  Patient has facial pressure.  Off and on.   Patient does not have decreased sense of smell.   Associated Symptoms:  Patient does not have headaches.  Patient has throat clearing.  Intermittent.   Patient has coughing.  Intermittent.   Patient has dysphonia.  High pitched ? Because of PND  Patient has sneezing.  Off and on.   Patient does not have nasal bleeding.    Medications currently on for sinonasal symptoms:  Flonase 2 puffs each side BID  Medications tried in the past for sinonasal symptoms:  Amoxicillin.      Other Pertinent Medical Conditions:  Patient does not have asthma  Patient does not have aspirin sensitivity.  Patient does not have migraines.  Patient does not have history of allergy testing.   Patient does not have history of sinus surgery.  Patient does not have history of nasal fracture.  Patient does not have heartburn  Patient does not have therapy for heartburn.  Patient does not have imaging of sinuses.     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, right    Other specified hypothyroidism    Mixed hyperlipidemia    Lipoma of  right axilla    Primary hypertension    Pain in right lower leg    Erythrocytosis    Menopausal symptoms    Acute left otitis media    Appendiceal mucinous cystadenoma    Blood pressure elevated without history of HTN    Cataract    Closed fracture of base of fifth metatarsal bone of left foot with delayed healing    Closed fracture of base of fifth metatarsal bone    Colonic mass    Combined form of senile cataract of both eyes    DDD (degenerative disc disease), lumbosacral    Fungal nail infection    Iliotibial band friction syndrome of both knees    Kyphosis (acquired) (postural)    Lumbar spondylosis    Migraine with aura and without status migrainosus, not intractable    Lumbosacral radiculitis    Myopia of both eyes    Myopia with astigmatism and presbyopia    Sinus congestion    Neuroma of left lower extremity    Nevus of left conjunctiva    Osteopenia    Lumbar radiculopathy    Pinguecula of both eyes    Post-menopausal bleeding    Primary osteoarthritis of right hip    Plantar fasciitis, left    Rash and nonspecific skin eruption    Segmental and somatic dysfunction    Sprain and strain of interphalangeal (joint) of hand    Submucosal colonic lesion    Tendonitis    Varicose veins of both lower extremities with pain    Vision changes    Vitamin D deficiency    Overweight with body mass index (BMI) of 29 to 29.9 in adult    Cold sore    Sinusitis    Impacted cerumen of right ear    Ear ache    Hearing difficulty    Heart murmur      Past Medical History:  She has a past medical history of Asymptomatic menopausal state (01/08/2018), Asymptomatic menopausal state (01/08/2018), Pain in right foot (08/26/2016), Personal history of other endocrine, nutritional and metabolic disease, Superficial foreign body of unspecified parts of thorax, initial encounter (07/24/2015), Superficial foreign body of unspecified parts of thorax, initial encounter (07/24/2015), and Thyrotoxicosis with diffuse goiter without  "thyrotoxic crisis or storm.    Surgical History:  She has a past surgical history that includes  section, classic (10/28/2013) and Knee surgery (10/28/2013).     Family History:  Family History   Problem Relation Name Age of Onset    Hypertension Mother      Stroke Mother      Hypertension Father      Crohn's disease Brother      Macular degeneration Mother's Sister       Social History:  She reports that she has never smoked. She has never been exposed to tobacco smoke. She has never used smokeless tobacco. She reports current alcohol use of about 7.0 standard drinks of alcohol per week. She reports that she does not use drugs.     Allergies:  Gabapentin    Current Meds:    Current Outpatient Medications:     amLODIPine (Norvasc) 2.5 mg tablet, Take 1 tablet (2.5 mg) by mouth once daily., Disp: 30 tablet, Rfl: 5    estradiol (Estrace) 0.5 mg tablet, TAKE 1 TABLET BY MOUTH EVERY DAY, Disp: 90 tablet, Rfl: 3    fluticasone (Flonase) 50 mcg/actuation nasal spray, INHALE 1 SPRAY IN EACH NOSTRIL TWICE DAILY, Disp: 48 mL, Rfl: 2    levothyroxine (Synthroid, Levoxyl) 125 mcg tablet, TAKE 1 TABLET BY MOUTH EVERY DAY, Disp: 90 tablet, Rfl: 2    liothyronine (Cytomel) 5 mcg tablet, TAKE 2 TABLETS BY MOUTH EVERY DAY, Disp: 180 tablet, Rfl: 3    norethindrone (Aygestin) 5 mg tablet, Take 1 tablet (5 mg) by mouth once daily., Disp: , Rfl:     tirzepatide, weight loss, (Zepbound) 12.5 mg/0.5 mL injection, Inject 12.5 mg under the skin every 7 days., Disp: 2 mL, Rfl: 0    tretinoin (Retin-A) 0.1 % cream, Apply 1 Application topically., Disp: , Rfl:     valACYclovir (Valtrex) 1 gram tablet, TAKE 2 TABLET BY MOUTH EVERY TWELVE HOURS AS NEEDED FOR 1 DAY, REPEAT IF NEEDED, Disp: 24 tablet, Rfl: 1    azelastine (Astelin) 137 mcg (0.1 %) nasal spray, Administer 1 spray into each nostril 2 times a day. (Patient not taking: Reported on 2024), Disp: 30 mL, Rfl: 1    Vitals:  Visit Vitals  Ht 1.6 m (5' 3\")   Wt 53.3 kg (117 lb " 6.4 oz)   BMI 20.80 kg/m²   OB Status Menopausal   Smoking Status Never   BSA 1.54 m²      Physical Exam:  CONSTITUTIONAL:  Vitals reviewed in nursing chart, well developed, well nourished.    RESPIRATION:  Breathing comfortably, no stridor.  CV:  No clubbing/cyanosis/edema in hands.  EYES:  EOM Intact, sclera normal.  NEURO:  Alert and oriented times 3, Cranial nerves 2-12 intact and symmetric bilaterally.  HEAD AND FACE:  Skin with no masses or lesions, sinuses nontender to palpation.  SALIVARY GLANDS:  Parotid and submandibular glands normal bilaterally.  EARS:  Normal external ears, external auditory canals, and TMs to otoscopy, normal hearing to whispered voice.  NOSE:  External nose midline, anterior rhinoscopy is normal with limited visualization to the anterior aspect of the interior turbinates (see nasal endoscopy).  ORAL CAVITY/OROPHARYNX/LIPS:  Normal mucous membranes, normal floor of mouth/tongue/OP, no masses or lesions are noted.  PHARYNGEAL WALLS AND NASOPHARYNX:  No masses noted.  NECK/LYMPH:  No LAD, no thyroid masses.  LARYNX:  No lesions noted.  Normal vocal fold mobility bilateral.    SINONASAL ENDOSCOPY (CPT 81004):  To better evaluate the patient's symptoms, sinonasal endoscopy is indicated.  After discussion of risks and benefits, and topical decongestion and anesthesia, an endoscope was used to perform nasal endoscopy on each side.  A time out identifying the patient, the procedure, the location of the procedure and any concerns was performed prior to beginning the procedure.    Findings:  Examination of the right nasal cavity revealed no evidence of purulence or polyposis at the middle meatus or sphenoethmoid recess.  Examination of the left nasal cavity revealed no evidence of purulence or polyposis at the middle meatus or sphenoethmoid recess.  Mild anterior nasal cavity dryness bilaterally this is consistent with topical steroid use.    Results/Data:  I personally reviewed the last note  from otolaryngology from Dr. Jerry May 13, 2024.  She was being seen at that time for ear related issues.    I reviewed a note from her primary care provider from 8/10/2024 and at that time she was referred to ENT and was prescribed amoxicillin for sinusitis.    Provider Impressions:  1.  Rhinorrhea  2.  Nasal airway obstruction  3.  Facial pressure  4.  Throat clearing, coughing, dysphonia    Discussion:  Mara Montoya and I discussed options including trials of additional intranasal medical therapy, allergy assessment, or imaging of her sinuses.  She has used Nasonex and Flonase without resolution of her current symptoms.    Specifically in regard to imaging, I think there would be some value there given that her symptoms progressed after a bad sinus infection and I did not see anything concerning on nasal endoscopy today.  This would allow us to understand if there is an inflammatory and not necessarily infectious issue in her sinuses causing her symptoms.  She was comfortable moving forward with imaging and this was ordered today.  I asked her to coordinate a virtual visit with me after completion of the imaging to review those findings and formulate next steps.  Given that she has just recently completed amoxicillin and there was no evidence of infection I would not provide additional antibiotic at this time.  All questions were answered.    Signature:  Ed Dupree MD.

## 2024-08-16 ENCOUNTER — TELEPHONE (OUTPATIENT)
Dept: OTOLARYNGOLOGY | Facility: HOSPITAL | Age: 74
End: 2024-08-16
Payer: MEDICARE

## 2024-08-16 NOTE — TELEPHONE ENCOUNTER
Patient left  that she needed to schedule Virtual f/u appt with KR. When I called her back, got  so left a message that I put her on schedule for 8/26 @ 4pm for a Virtual and to call back if that was not good for her to reschedule.

## 2024-08-18 NOTE — RESULT ENCOUNTER NOTE
Your recent echocardiogram was within acceptable range.  Mild regurgitation of aortic valve noted. No interventions needed at this time.  Will monitor.  We will discuss details during your next office visit. Please keep your next appointment as scheduled. Dr. Mckeon

## 2024-08-19 DIAGNOSIS — I10 PRIMARY HYPERTENSION: ICD-10-CM

## 2024-08-19 DIAGNOSIS — E03.9 HYPOTHYROIDISM, UNSPECIFIED: ICD-10-CM

## 2024-08-20 RX ORDER — LEVOTHYROXINE SODIUM 125 UG/1
125 TABLET ORAL DAILY
Qty: 90 TABLET | Refills: 2 | Status: SHIPPED | OUTPATIENT
Start: 2024-08-20

## 2024-08-20 RX ORDER — AMLODIPINE BESYLATE 2.5 MG/1
2.5 TABLET ORAL DAILY
Qty: 90 TABLET | Refills: 2 | Status: SHIPPED | OUTPATIENT
Start: 2024-08-20

## 2024-08-26 ENCOUNTER — APPOINTMENT (OUTPATIENT)
Dept: OTOLARYNGOLOGY | Facility: CLINIC | Age: 74
End: 2024-08-26
Payer: MEDICARE

## 2024-08-28 ENCOUNTER — HOSPITAL ENCOUNTER (OUTPATIENT)
Dept: RADIOLOGY | Facility: CLINIC | Age: 74
Discharge: HOME | End: 2024-08-28
Payer: MEDICARE

## 2024-08-28 DIAGNOSIS — R09.82 POST-NASAL DRAINAGE: ICD-10-CM

## 2024-08-28 PROCEDURE — 70486 CT MAXILLOFACIAL W/O DYE: CPT

## 2024-09-05 NOTE — PROGRESS NOTES
Virtual visit consent:  An interactive audio and video telecommunication system which permits real time communications between the patient (at the originating site) and provider (at the distant site) was utilized to provide this telehealth service. Verbal consent was requested and obtained from Mara Montoya on this date, 09/09/2024, for a telehealth visit.    Chief Complaint:  1.  Rhinorrhea  2.  Nasal airway obstruction  3.  Facial pressure  4.  Throat clearing, coughing, dysphonia    History Of Present Illness:  Mara Montoya presents since last being seen 8/14/24.     She continues with symptoms as outlined in the chief complaint.  She is currently using fluticasone 2 puffs each side twice per day.  A CT sinus was ordered following her last evaluation with me and we reviewed this together today.    Medications tried in the past for sinonasal symptoms:  Amoxicillin.    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, right    Other specified hypothyroidism    Mixed hyperlipidemia    Lipoma of right axilla    Primary hypertension    Pain in right lower leg    Erythrocytosis    Menopausal symptoms    Acute left otitis media    Appendiceal mucinous cystadenoma    Blood pressure elevated without history of HTN    Cataract    Closed fracture of base of fifth metatarsal bone of left foot with delayed healing    Closed fracture of base of fifth metatarsal bone    Colonic mass    Combined form of senile cataract of both eyes    DDD (degenerative disc disease), lumbosacral    Fungal nail infection    Iliotibial band friction syndrome of both knees    Kyphosis (acquired) (postural)    Lumbar spondylosis    Migraine with aura and without status migrainosus, not intractable    Lumbosacral radiculitis    Myopia of both eyes    Myopia with astigmatism and presbyopia    Sinus congestion    Neuroma of left lower extremity    Nevus of left conjunctiva    Osteopenia     Lumbar radiculopathy    Pinguecula of both eyes    Post-menopausal bleeding    Primary osteoarthritis of right hip    Plantar fasciitis, left    Rash and nonspecific skin eruption    Segmental and somatic dysfunction    Sprain and strain of interphalangeal (joint) of hand    Submucosal colonic lesion    Tendonitis    Varicose veins of both lower extremities with pain    Vision changes    Vitamin D deficiency    Overweight with body mass index (BMI) of 29 to 29.9 in adult    Cold sore    Sinusitis    Impacted cerumen of right ear    Ear ache    Hearing difficulty    Heart murmur     Past Medical History:  She has a past medical history of Asymptomatic menopausal state (2018), Asymptomatic menopausal state (2018), Pain in right foot (2016), Personal history of other endocrine, nutritional and metabolic disease, Superficial foreign body of unspecified parts of thorax, initial encounter (2015), Superficial foreign body of unspecified parts of thorax, initial encounter (2015), and 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    Surgical History:  She has a past surgical history that includes  section, classic (10/28/2013) and Knee surgery (10/28/2013).     Family History:  Family History   Problem Relation Name Age of Onset    Hypertension Mother      Stroke Mother      Hypertension Father      Crohn's disease Brother      Macular degeneration Mother's Sister       Social History:  She reports that she has never smoked. She has never been exposed to tobacco smoke. She has never used smokeless tobacco. She reports current alcohol use of about 7.0 standard drinks of alcohol per week. She reports that she does not use drugs.     Allergies:  Gabapentin    Current Meds:  Current Outpatient Medications:     amLODIPine (Norvasc) 2.5 mg tablet, TAKE 1 TABLET BY MOUTH ONCE DAILY., Disp: 90 tablet, Rfl: 2    azelastine (Astelin) 137 mcg (0.1 %) nasal spray,  Administer 1 spray into each nostril 2 times a day. (Patient not taking: Reported on 8/14/2024), Disp: 30 mL, Rfl: 1    estradiol (Estrace) 0.5 mg tablet, TAKE 1 TABLET BY MOUTH EVERY DAY, Disp: 90 tablet, Rfl: 3    fluticasone (Flonase) 50 mcg/actuation nasal spray, INHALE 1 SPRAY IN EACH NOSTRIL TWICE DAILY, Disp: 48 mL, Rfl: 2    levothyroxine (Synthroid, Levoxyl) 125 mcg tablet, Take 1 tablet (125 mcg) by mouth once daily., Disp: 90 tablet, Rfl: 2    liothyronine (Cytomel) 5 mcg tablet, TAKE 2 TABLETS BY MOUTH EVERY DAY, Disp: 180 tablet, Rfl: 3    norethindrone (Aygestin) 5 mg tablet, Take 1 tablet (5 mg) by mouth once daily., Disp: , Rfl:     tirzepatide, weight loss, (Zepbound) 12.5 mg/0.5 mL injection, Inject 12.5 mg under the skin every 7 days., Disp: 2 mL, Rfl: 0    tretinoin (Retin-A) 0.1 % cream, Apply 1 Application topically., Disp: , Rfl:     valACYclovir (Valtrex) 1 gram tablet, TAKE 2 TABLET BY MOUTH EVERY TWELVE HOURS AS NEEDED FOR 1 DAY, REPEAT IF NEEDED, Disp: 24 tablet, Rfl: 1    Vitals:  Visit Vitals  OB Status Menopausal   Smoking Status Never     Physical Exam:  Virtual visit.    Results/Data:  The patient and I reviewed her CT sinus from August 28, 2024.  There were no significant inflammatory changes within her sinuses.  In her most posterior left upper tooth there could be a small cavity at the root and we discussed this.    Provider Impressions:  1.  Rhinorrhea  2.  Nasal airway obstruction  3.  Facial pressure  4.  Throat clearing, coughing, dysphonia    Discussion:  Mara Montoya and I discussed options.  Given the results of the CT sinus I would not recommend any type of surgical intervention.  She was comfortable considering further medical options.  I recommended continuation of fluticasone.    Medications started today: Azelastine, ipratropium    We reviewed appropriate administration technique.  I recommended the patient begin with the topical antihistamine over the next 2  weeks.  If at after 2 weeks, there is clear benefit, I recommended continuation.  If there is not, I recommended discontinuation of that therapy.  At that time, I recommended initiation of ipratropium.  We discussed the rationale for staggering these therapies.  Fluticasone, azelastine, and ipratropium can be administered at the same time as there are no interactions.    Medications stopped today: None    I asked her to follow-up with me either face-to-face or virtually in 2 months.  If her symptoms persist at that time we could consider allergy testing or potentially a different intranasal medical therapy.  There are also considerations with nonheartburn reflux particularly for her postnasal drainage symptomatology.  All questions were answered.    Scribe Attestation  By signing my name below, I, Gurjit Garcia, attest that this documentation has been prepared under the direction and in the presence of Ed Dupree MD.    Signature:  Ed Dupree MD

## 2024-09-09 ENCOUNTER — APPOINTMENT (OUTPATIENT)
Dept: OTOLARYNGOLOGY | Facility: CLINIC | Age: 74
End: 2024-09-09
Payer: MEDICARE

## 2024-09-09 ENCOUNTER — APPOINTMENT (OUTPATIENT)
Dept: PHARMACY | Facility: HOSPITAL | Age: 74
End: 2024-09-09
Payer: MEDICARE

## 2024-09-09 DIAGNOSIS — E66.3 OVERWEIGHT WITH BODY MASS INDEX (BMI) OF 29 TO 29.9 IN ADULT: Primary | ICD-10-CM

## 2024-09-09 DIAGNOSIS — R09.82 POST-NASAL DRAINAGE: Primary | ICD-10-CM

## 2024-09-09 PROCEDURE — 1036F TOBACCO NON-USER: CPT | Performed by: OTOLARYNGOLOGY

## 2024-09-09 PROCEDURE — 1123F ACP DISCUSS/DSCN MKR DOCD: CPT | Performed by: OTOLARYNGOLOGY

## 2024-09-09 PROCEDURE — 1160F RVW MEDS BY RX/DR IN RCRD: CPT | Performed by: OTOLARYNGOLOGY

## 2024-09-09 PROCEDURE — 1159F MED LIST DOCD IN RCRD: CPT | Performed by: OTOLARYNGOLOGY

## 2024-09-09 PROCEDURE — 99213 OFFICE O/P EST LOW 20 MIN: CPT | Performed by: OTOLARYNGOLOGY

## 2024-09-09 RX ORDER — AZELASTINE 1 MG/ML
2 SPRAY, METERED NASAL 2 TIMES DAILY
Qty: 30 ML | Refills: 11 | Status: SHIPPED | OUTPATIENT
Start: 2024-09-09 | End: 2025-09-09

## 2024-09-09 RX ORDER — IPRATROPIUM BROMIDE 21 UG/1
2 SPRAY, METERED NASAL 3 TIMES DAILY
Qty: 30 ML | Refills: 11 | Status: SHIPPED | OUTPATIENT
Start: 2024-09-09 | End: 2025-09-09

## 2024-09-09 NOTE — PROGRESS NOTES
Patient is sent at the request of Liza Hebert M* for my opinion regarding weight management.  My final recommendations will be communicated back to the requesting provider by way of shared medical record.    Subjective     Mara Montoya is a 74 y.o. female who is overweight as evidenced by her most recent BMI of 24.33 kg/m2 as of 24 complicated by hyperlipidemia and hypertension. She was started on Wegovy 0.25 mg once weekly by Dr. Mckeon on 10/19/23. At this time her BMI was 29.63 kg/m2.     At last pharmacy visit patient's Zepbound was continued at 15 mg once weekly until she completed her last pens at home, then she reduced down to 12.5 mg once weekly. We are following-up today to see how she has done with the dose reduction and to ensure she has maintained weight lost.     She had inquired about giving Zepbound 15 mg every 10 days instead of every 7 days in place of reducing the dose. We discussed that ideally we would want her to take a reduced dose at the FDA approved dosing interval and she was encouraged to go back to every 7 days when she reduced down to 12.5 mg once weekly. Today she reports she has continued every 10 days and has been able to manage the increased hunger that comes back on days 6-10.     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    Past Surgical History:  She has a past surgical history that includes  section, classic (10/28/2013) and Knee surgery (10/28/2013).    Social History:  She reports that she has never  smoked. She has never been exposed to tobacco smoke. She has never used smokeless tobacco. She reports current alcohol use of about 7.0 standard drinks of alcohol per week. She reports that she does not use drugs.    Family History:  Family History   Problem Relation Name Age of Onset    Hypertension Mother      Stroke Mother      Hypertension Father      Crohn's disease Brother      Macular degeneration Mother's Sister       Allergies:  Gabapentin    Current diet:   Breakfast: egg with toast, coffee  Lunch: tomato with a slice of bread   Dinner: salmon, sweet potatoes, green vegetables   Snacks: not really snacking, is does may have popcorn   Fluids: water, tea at night, has been working on increasing fluid intake   Will also have oranges occasionally   Has been eating more hardboiled eggs recently     Current exercise:   Has been working on increasing activity   Is using physical therapy exercises at home   Yoga once a week  Pilates once a week   Notes she has had some leg pain when doing a lot of cardio exercises so has cut back on this     Previous Attempts at Weight Loss:   Was in a program with [facility] with  and son to eat a plant based diet, states this has led to some of their current dietary habits  Has tried intermittent fasting   Has also tried the Mediterranean diet    Rentho scale - states that this works with an azucena on her phone to calculate BMI and give dietary recommendations     The patient does not have a known family history of diabetes.    Adverse Effects:   Still notes some mild constipation (takes Miralax ~1 time per week which helps)    Objective     Weight Management Pharmacotherapy:  Zepbound 12.5 mg under the skin once weekly (has been giving every 10 days)     Historical Weight Management Pharmacotherapy:   Wegovy 1 mg (switched to Zepbound due to it being cheaper)    Pertinent PMH Review:  PMH of Pancreatitis: No  PMH of Retinopathy: No  PMH of MTC: No  PMH of MEN 2: No    Lab  "Review  Lab Results   Component Value Date    BILITOT 0.8 02/17/2024    CALCIUM 9.6 02/17/2024    CO2 27 02/17/2024     (H) 02/17/2024    CREATININE 0.75 02/17/2024    GLUCOSE 87 02/17/2024    ALKPHOS 31 (L) 02/17/2024    K 4.0 02/17/2024    PROT 6.1 (L) 02/17/2024     02/17/2024    AST 15 02/17/2024    ALT 15 02/17/2024    BUN 12 02/17/2024    ANIONGAP 11 02/17/2024    MG 1.70 05/18/2021    PHOS 2.6 05/18/2021    ALBUMIN 4.2 02/17/2024    GFRF 89 07/14/2023     Lab Results   Component Value Date    TRIG 84 02/17/2024    CHOL 206 (H) 02/17/2024    LDLCALC 133 (H) 02/17/2024    HDL 56.0 02/17/2024     No results found for: \"HGBA1C\"  No components found for: \"UACR\"  The 10-year ASCVD risk score (Josi TONY, et al., 2019) is: 32.3%    Values used to calculate the score:      Age: 74 years      Sex: Female      Is Non- : No      Diabetic: Yes      Tobacco smoker: No      Systolic Blood Pressure: 132 mmHg      Is BP treated: Yes      HDL Cholesterol: 56 mg/dL      Total Cholesterol: 206 mg/dL    Drug Interactions:  No significant drug-drug interactions exist that require an adjustment to medication therapy.    Wt Readings from Last 6 Encounters:   08/14/24 53.3 kg (117 lb 6.4 oz)   07/18/24 54.9 kg (121 lb)   05/13/24 60.3 kg (133 lb)   05/10/24 60.6 kg (133 lb 9.6 oz)   04/04/24 63.2 kg (139 lb 6.4 oz)   03/26/24 65.8 kg (145 lb)     Estimated body mass index is 20.8 kg/m² as calculated from the following:    Height as of 8/14/24: 1.6 m (5' 3\").    Weight as of 8/14/24: 53.3 kg (117 lb 6.4 oz).    Patient Reported Home Weights:   02/21/24: 146 lbs   03/18/24: 143 lbs   04/22/24: 134 lbs   06/17/24: 128 lbs (calculated BMI 23.4 kg/m2)  07/15/24: 120.2 lbs (calculated BMI 22 kg/m2)  08/12/24: 119 lbs (calculated BMI 21.8 kg/m2)  09/09/24: 115 lbs (calculated BMI 20.4 kg/m2)    Assessment/Plan   Problem List Items Addressed This Visit       Overweight with body mass index (BMI) of 29 to " 29.9 in adult - Primary     Current regimen includes Zepbound 15 mg under the skin every 10 days (patient has reduced down to every 10 days from every 7 days). Patient's current weight reported as 115 lbs. Starting weight was 162 lbs on 10/19/23. Has lost 43 lbs (~29% of TBW) since starting therapy plan. Patient feels she has reached her goal weight now and has been able to maintain at 115 lbs since reducing her dose    We discussed reducing further down to 10 mg every 10 days after she finishes her 3 pens of 12.5 mg every 10 days. For now patient would like to complete another box of 12.5 mg pens taking once every 10 days.     We had previously discussed that taking the medication every 10 days is not FDA approved dosing and it would be better for maintaining weight lost to reduce the dose and keep it at once weekly instead of staying at a higher dose and taking every 10 days. She would like to continue dosing every 10 days.     Medication Changes:  CONTINUE:  Zepbound 12.5 mg under the skin every 10 days (she has 3 doses remaining at home and would like to continue for an additional package of 4 doses)    Future Considerations:  Continue to titrate down to 10 mg once weekly at follow-up as long as patient is able to maintain her weight loss with each titration. If she notices any weight gain we can increase her dose back up to the dose she was at prior.     Monitoring and Education:  Patient educated that ideally Zepbound should be dosed at once weekly instead of every 10 days, however she would like to continue it at every 10 days and see how she does  She was informed effects may wear off around day 7-8 and she may note significant increased hunger the other days  We will continue to monitor her weight and for any new/worsening side effects at each encounter    We will plan to follow-up in ~2 monthss to see how she has done taking 12.5 mg every 10 days and to discuss continuing to titrate her dose down as  tolerated. She was encouraged to reach out with any questions and/or concerns.          Relevant Medications    tirzepatide, weight loss, (Zepbound) 12.5 mg/0.5 mL injection    Other Relevant Orders    Follow Up In [facility] - Pharmacy     Pharmacy Follow-Up: 11/04/2024   PCP Follow-Up: 10/22/2024    Iglesia Watson PharmD     Continue all meds under the continuation of care with the referring provider and clinical pharmacy team.

## 2024-09-09 NOTE — ASSESSMENT & PLAN NOTE
Current regimen includes Zepbound 15 mg under the skin every 10 days (patient has reduced down to every 10 days from every 7 days). Patient's current weight reported as 115 lbs. Starting weight was 162 lbs on 10/19/23. Has lost 43 lbs (~29% of TBW) since starting therapy plan. Patient feels she has reached her goal weight now and has been able to maintain at 115 lbs since reducing her dose    We discussed reducing further down to 10 mg every 10 days after she finishes her 3 pens of 12.5 mg every 10 days. For now patient would like to complete another box of 12.5 mg pens taking once every 10 days.     We had previously discussed that taking the medication every 10 days is not FDA approved dosing and it would be better for maintaining weight lost to reduce the dose and keep it at once weekly instead of staying at a higher dose and taking every 10 days. She would like to continue dosing every 10 days.     Medication Changes:  CONTINUE:  Zepbound 12.5 mg under the skin every 10 days (she has 3 doses remaining at home and would like to continue for an additional package of 4 doses)    Future Considerations:  Continue to titrate down to 10 mg once weekly at follow-up as long as patient is able to maintain her weight loss with each titration. If she notices any weight gain we can increase her dose back up to the dose she was at prior.     Monitoring and Education:  Patient educated that ideally Zepbound should be dosed at once weekly instead of every 10 days, however she would like to continue it at every 10 days and see how she does  She was informed effects may wear off around day 7-8 and she may note significant increased hunger the other days  We will continue to monitor her weight and for any new/worsening side effects at each encounter    We will plan to follow-up in ~2 monthss to see how she has done taking 12.5 mg every 10 days and to discuss continuing to titrate her dose down as tolerated. She was encouraged to  reach out with any questions and/or concerns.

## 2024-09-12 ENCOUNTER — CLINICAL SUPPORT (OUTPATIENT)
Dept: AUDIOLOGY | Facility: CLINIC | Age: 74
End: 2024-09-12
Payer: MEDICARE

## 2024-09-12 DIAGNOSIS — H90.3 SENSORINEURAL HEARING LOSS (SNHL) OF BOTH EARS: ICD-10-CM

## 2024-09-12 PROCEDURE — 92550 TYMPANOMETRY & REFLEX THRESH: CPT | Performed by: AUDIOLOGIST

## 2024-09-12 PROCEDURE — 92557 COMPREHENSIVE HEARING TEST: CPT | Performed by: AUDIOLOGIST

## 2024-09-12 NOTE — PROGRESS NOTES
AUDIOLOGY ADULT AUDIOMETRIC EVALUATION      Name:  Mara Montoya   :  1950  Age:  74 y.o.  Date of Evaluation:  2024    Time: 4680-6333    IMPRESSIONS     Normal hearing sensitivity 125-750 Hz sloping to severe sensorineural hearing loss in the right ear, and normal hearing sensitivity 125-500 Hz sloping to moderately-severe sensorineural hearing loss in the left ear.  Word understanding in quiet is good in both ears.  Tympanometry indicates normal middle ear pressure and tympanic membrane mobility in both ears.     Today's test results are hearing loss requiring medical/otologic and audiologic follow-up.     RECOMMENDATIONS     Continue medical follow up with primary care provider and/or Ears Nose and Throat (ENT) provider as recommended.  Schedule an evaluation with an Ears Nose and Throat (ENT) provider to address asymmetric hearing loss. For ENT scheduling, call [phone].   Return for audiologic evaluation annually to monitor hearing sensitivity and assess middle ear status or sooner should concerns arise. The audiology department can be reached at [phone] to schedule an appointment.   Consider amplification pending medical clearance. Check insurance benefit for hearing aid benefits and in-network providers. To schedule a hearing aid consultation with [facility] audiology department, call [phone]. Patient was provided with a copy of today's audiogram.  Avoid exposure to loud sounds by moving away from the noise, turning down the volume, or wearing proper hearing protection correctly.    HISTORY     Reason for visit:  Ms. Montoya is seen today for an initial audiologic evaluation at the request of Emilee MCELROY PhD due to complaints of gradual decrease in hearing in both ears. History obtained from patient report and chart review.     Change in Hearing: yes in both ears  Difficult listening environments: Conversations with her  at home, watching  "TV  Tinnitus: denied  Otalgia: denied  Aural Pressure/Fullness: yes, noted sinus issues  Recent Ear Infections/Illness: yes in the right ear a few months ago  Otorrhea: denied  Dizziness: denied  History of Ear Surgeries: denied  Family History of Hearing Loss: Yes, her father with aging   Hearing Aid Use: None    EVALUATION     See scanned audiogram in \"Media\"     TEST RESULTS     Otoscopic Evaluation:  Right Ear: Ear canal clear, tympanic membrane visualized.  Left Ear: Ear canal clear, tympanic membrane visualized.    Tympanometry (226 Hz):  Right Ear: Type A, middle ear pressure and tympanic membrane compliance within normal limits.   Left Ear: Type A, middle ear pressure and tympanic membrane compliance within normal limits.     Acoustic Reflexes:   Right Ear: (Ipsilateral) Responses present at expected levels for 500-4000 Hz.  Left Ear: (Ipsilateral) Responses present at expected levels for 500-4000 Hz.    Distortion Product Otoacoustic Emissions:  Right Ear: Did not test.  Left Ear:  Did not test.  Present OAEs suggest normal or near cochlear outer hair cell function for corresponding frequency region(s). Absent OAEs with normal middle ear function can be consistent with some degree of hearing loss. Assessment of cochlear outer hair cell function may be impacted by outer or middle ear function.    Test technique:  Pure Tone Audiometry via headphones  Reliability: Good    Pure Tone Audiometry:    Right Ear: Normal hearing sensitivity 125-750 Hz sloping to severe sensorineural hearing loss.   Left Ear: Normal hearing sensitivity 125-500 Hz sloping to moderately-severe sensorineural hearing loss    Speech Audiometry:   Right Ear:  Speech Reception Threshold (SRT) was obtained at 10 dB HL. Word Recognition scores were good (88%) in quiet when words were presented at 60 dB HL. These results are based on [facility] Auditory Test No.6 (NU-6) (N=25).   Left Ear:  Speech Reception Threshold (SRT) was " obtained at 20 dB HL. Word Recognition scores were good (84%) in quiet when words were presented at 60 dB HL. These results are based on [facility] Auditory Test No.6 (NU-6) (N=25).     Comparison of today's results with previous test results: No previous results available.         PATIENT EDUCATION     Discussed results and recommendations with Ms. Montoya. Questions were addressed and the patient was encouraged to contact our department at [phone] should concerns arise.     SHANELLE Riojas.  Audiology Graduate Clinician    Viridiana Hector, CCC-A  Licensed Senior Audiologist     Degree of   Hearing Sensitivity dB Range   Within Normal Limits (WNL) 0 - 20   Slight 25   Mild 26 - 40   Moderate 41 - 55   Moderately-Severe 56 - 70   Severe 71 - 90   Profound 91 +     Key   CHL Conductive Hearing Loss   ECV Ear Canal Volume   HA Hearing Aid   NIHL Noise-Induced Hearing Loss   PTA Pure Tone Average   SNHL Sensorineural Hearing Loss   TM Tympanic Membrane   WNL Within Normal Limits

## 2024-10-02 DIAGNOSIS — R09.82 POST-NASAL DRAINAGE: ICD-10-CM

## 2024-10-03 ENCOUNTER — APPOINTMENT (OUTPATIENT)
Dept: OTOLARYNGOLOGY | Facility: CLINIC | Age: 74
End: 2024-10-03
Payer: MEDICARE

## 2024-10-08 RX ORDER — IPRATROPIUM BROMIDE 21 UG/1
2 SPRAY, METERED NASAL 3 TIMES DAILY
Qty: 90 ML | Refills: 3 | Status: SHIPPED | OUTPATIENT
Start: 2024-10-08 | End: 2025-10-08

## 2024-10-15 ENCOUNTER — APPOINTMENT (OUTPATIENT)
Dept: OTOLARYNGOLOGY | Facility: CLINIC | Age: 74
End: 2024-10-15
Payer: MEDICARE

## 2024-10-15 VITALS
SYSTOLIC BLOOD PRESSURE: 150 MMHG | HEIGHT: 63 IN | TEMPERATURE: 97.3 F | BODY MASS INDEX: 20.8 KG/M2 | DIASTOLIC BLOOD PRESSURE: 83 MMHG

## 2024-10-15 DIAGNOSIS — H90.3 BILATERAL SENSORINEURAL HEARING LOSS: Primary | ICD-10-CM

## 2024-10-15 PROCEDURE — 1123F ACP DISCUSS/DSCN MKR DOCD: CPT | Performed by: OTOLARYNGOLOGY

## 2024-10-15 PROCEDURE — 3079F DIAST BP 80-89 MM HG: CPT | Performed by: OTOLARYNGOLOGY

## 2024-10-15 PROCEDURE — 1036F TOBACCO NON-USER: CPT | Performed by: OTOLARYNGOLOGY

## 2024-10-15 PROCEDURE — 3077F SYST BP >= 140 MM HG: CPT | Performed by: OTOLARYNGOLOGY

## 2024-10-15 PROCEDURE — 99214 OFFICE O/P EST MOD 30 MIN: CPT | Performed by: OTOLARYNGOLOGY

## 2024-10-15 PROCEDURE — 1159F MED LIST DOCD IN RCRD: CPT | Performed by: OTOLARYNGOLOGY

## 2024-10-15 NOTE — PROGRESS NOTES
Impression:  Bilateral sensorineural hearing loss-asymmetric low-frequency loss in the left ear with an asymmetric high-frequency loss in the right ear    RECOMMENDATIONS/PLAN :  I explained to the patient that medically her ears look excellent and more than likely she would benefit from hearing aids.  We will repeat an audiogram in 6 months and make sure there is not any progression in one of the ears.  She will call if she notices any significant fullness or pressure in the ears or any vertigo and at that point we would get an MRI of her IACs.  She would like to hold off for now.      **This electronic medical record note was created with the use of voice recognition software.  Despite proofreading, typographical or grammatical errors may be present that could affect meaning of content **    Subjective   Patient ID:     Mara Montoya is a 74 y.o. female who presents to the office today with a gradual onset of hearing loss in both ears.  She denies any vertigo or any neurologic complaints.  No history of middle ear infections or drainage from the ears.  No imbalance or unsteadiness.  She denies any fullness or pressure within the ears.    ROS:  A detailed 12 system review of systems is noted on the intake form has been reviewed with the patient with details noted in the HPI and scanned into the patient's medical record.    Objective     Past Medical History:   Diagnosis Date    Asymptomatic menopausal state 01/08/2018    Asymptomatic age-related postmenopausal state    Asymptomatic menopausal state 01/08/2018    Asymptomatic age-related postmenopausal state    Pain in right foot 08/26/2016    Pain of right heel    Personal history of other endocrine, nutritional and metabolic disease     History of hypothyroidism    Superficial foreign body of unspecified parts of thorax, initial encounter 07/24/2015    Superficial foreign body of trunk with infection, initial encounter    Superficial foreign body of  unspecified parts of thorax, initial encounter 2015    Superficial foreign body of trunk with infection, initial encounter    Thyrotoxicosis with diffuse goiter without thyrotoxic crisis or storm     Graves disease        Past Surgical History:   Procedure Laterality Date     SECTION, CLASSIC  10/28/2013     Section    KNEE SURGERY  10/28/2013    Knee Surgery Right        Allergies   Allergen Reactions    Gabapentin Hives          Current Outpatient Medications:     amLODIPine (Norvasc) 2.5 mg tablet, TAKE 1 TABLET BY MOUTH ONCE DAILY., Disp: 90 tablet, Rfl: 2    azelastine (Astelin) 137 mcg (0.1 %) nasal spray, Administer 2 sprays into each nostril 2 times a day. Use in each nostril as directed, Disp: 30 mL, Rfl: 11    estradiol (Estrace) 0.5 mg tablet, TAKE 1 TABLET BY MOUTH EVERY DAY, Disp: 90 tablet, Rfl: 3    fluticasone (Flonase) 50 mcg/actuation nasal spray, INHALE 1 SPRAY IN EACH NOSTRIL TWICE DAILY, Disp: 48 mL, Rfl: 2    ipratropium (Atrovent) 21 mcg (0.03 %) nasal spray, ADMINISTER 2 SPRAYS INTO EACH NOSTRIL 3 TIMES A DAY., Disp: 90 mL, Rfl: 3    levothyroxine (Synthroid, Levoxyl) 125 mcg tablet, Take 1 tablet (125 mcg) by mouth once daily., Disp: 90 tablet, Rfl: 2    liothyronine (Cytomel) 5 mcg tablet, TAKE 2 TABLETS BY MOUTH EVERY DAY, Disp: 180 tablet, Rfl: 3    norethindrone (Aygestin) 5 mg tablet, Take 1 tablet (5 mg) by mouth once daily., Disp: , Rfl:     tirzepatide, weight loss, (Zepbound) 12.5 mg/0.5 mL injection, Inject 12.5 mg under the skin every 7 days., Disp: 2 mL, Rfl: 0    tretinoin (Retin-A) 0.1 % cream, Apply 1 Application topically., Disp: , Rfl:     valACYclovir (Valtrex) 1 gram tablet, TAKE 2 TABLET BY MOUTH EVERY TWELVE HOURS AS NEEDED FOR 1 DAY, REPEAT IF NEEDED, Disp: 24 tablet, Rfl: 1     Tobacco Use: Low Risk  (10/15/2024)    Patient History     Smoking Tobacco Use: Never     Smokeless Tobacco Use: Never     Passive Exposure: Never        Alcohol Use: Not  "on file        Social History     Substance and Sexual Activity   Drug Use Never        Physical Exam:  Visit Vitals  /83   Temp 36.3 °C (97.3 °F) (Temporal)   Ht 1.6 m (5' 3\")   BMI 20.80 kg/m²   OB Status Menopausal   Smoking Status Never   BSA 1.54 m²      General: Patient is alert, oriented, cooperative in no apparent distress.  Head: Normocephalic, atraumatic.  Eyes: PERRL, EOMI, Conjunctiva is clear. No nystagmus.  Ears: Right Ear-- Pinna is normal.  External auditory canal is patent. Tympanic membrane is [intact, translucent and has good mobility with my pneumatic otoscope. No effusion].  Mastoid is nontender.  Left ear-- Pinna is normal.  External auditory canal is patent. Tympanic membrane is [intact, translucent and has good mobility with my pneumatic otoscope.  No effusion].  Mastoid is nontender.  Nose: Septum is straight.  No septal perforation or lesions. No septal hematoma/ seroma.  No signs of bleeding.  Inferior turbinates are normal.   No evidence of intranasal polyps.  No infectious drainage.  Throat:  Floor of mouth is clear, no masses.  Tongue appears normal, no lesions or masses. Gums, gingiva, buccal mucosa appear pink and moist, no lesions. Teeth are in good repair.  No obvious dental infections.  Peritonsillar regions appear symmetric without swelling.  Hard and soft palate appear normal, no obvious cleft. Uvula is midline.  Oropharynx: No lesions. Retropharyngeal wall is flat.  No active postnasal drip.  Neck: Supple,  no lymphadenopathy.  No masses.  Salivary Glands: Symmetric bilaterally.  No palpable masses.  No evidence of acute infection or salivary stones  Neurologic: Cranial Nerves 2-12 are grossly intact without focal deficits. Cerebellar function testing is normal.     Results:   I reviewed her recent audiogram and she has a bilateral high-frequency sensorineural loss however her left ear is asymmetric from the right at the low frequencies and her right ear is asymmetric from " the left at high frequencies.  Both tympanic membranes have normal mobility on tympanometry.  Word recognition scores 88% in the right ear and 84% in the left ear.  Speech reception threshold is 10 dB in the right ear and 20 dB in the left ear.    Procedure:   []    Juan A Nye, DO

## 2024-10-17 ENCOUNTER — APPOINTMENT (OUTPATIENT)
Dept: OBSTETRICS AND GYNECOLOGY | Facility: CLINIC | Age: 74
End: 2024-10-17
Payer: MEDICARE

## 2024-10-17 VITALS
BODY MASS INDEX: 20.55 KG/M2 | DIASTOLIC BLOOD PRESSURE: 72 MMHG | SYSTOLIC BLOOD PRESSURE: 122 MMHG | HEIGHT: 63 IN | WEIGHT: 116 LBS

## 2024-10-17 DIAGNOSIS — N95.1 MENOPAUSAL SYMPTOMS: Primary | ICD-10-CM

## 2024-10-17 DIAGNOSIS — N95.1 MENOPAUSAL AND FEMALE CLIMACTERIC STATES: ICD-10-CM

## 2024-10-17 PROCEDURE — 3078F DIAST BP <80 MM HG: CPT | Performed by: OBSTETRICS & GYNECOLOGY

## 2024-10-17 PROCEDURE — 3008F BODY MASS INDEX DOCD: CPT | Performed by: OBSTETRICS & GYNECOLOGY

## 2024-10-17 PROCEDURE — 1159F MED LIST DOCD IN RCRD: CPT | Performed by: OBSTETRICS & GYNECOLOGY

## 2024-10-17 PROCEDURE — 1126F AMNT PAIN NOTED NONE PRSNT: CPT | Performed by: OBSTETRICS & GYNECOLOGY

## 2024-10-17 PROCEDURE — 3074F SYST BP LT 130 MM HG: CPT | Performed by: OBSTETRICS & GYNECOLOGY

## 2024-10-17 PROCEDURE — 99213 OFFICE O/P EST LOW 20 MIN: CPT | Performed by: OBSTETRICS & GYNECOLOGY

## 2024-10-17 PROCEDURE — 1123F ACP DISCUSS/DSCN MKR DOCD: CPT | Performed by: OBSTETRICS & GYNECOLOGY

## 2024-10-17 RX ORDER — NORETHINDRONE 5 MG/1
5 TABLET ORAL DAILY
Qty: 90 TABLET | Refills: 3 | Status: SHIPPED | OUTPATIENT
Start: 2024-10-17

## 2024-10-17 RX ORDER — ESTRADIOL 0.5 MG/1
0.5 TABLET ORAL DAILY
Qty: 90 TABLET | Refills: 3 | Status: SHIPPED | OUTPATIENT
Start: 2024-10-17

## 2024-10-17 ASSESSMENT — PAIN SCALES - GENERAL: PAINLEVEL_OUTOF10: 0-NO PAIN

## 2024-10-20 DIAGNOSIS — E03.9 HYPOTHYROIDISM, UNSPECIFIED: ICD-10-CM

## 2024-10-21 RX ORDER — LIOTHYRONINE SODIUM 5 UG/1
10 TABLET ORAL DAILY
Qty: 180 TABLET | Refills: 3 | Status: SHIPPED | OUTPATIENT
Start: 2024-10-21

## 2024-10-22 ENCOUNTER — APPOINTMENT (OUTPATIENT)
Dept: PRIMARY CARE | Facility: CLINIC | Age: 74
End: 2024-10-22
Payer: MEDICARE

## 2024-10-22 VITALS
HEIGHT: 63 IN | SYSTOLIC BLOOD PRESSURE: 110 MMHG | TEMPERATURE: 98 F | DIASTOLIC BLOOD PRESSURE: 60 MMHG | BODY MASS INDEX: 20.62 KG/M2 | WEIGHT: 116.4 LBS | RESPIRATION RATE: 16 BRPM

## 2024-10-22 DIAGNOSIS — E66.3 OVERWEIGHT WITH BODY MASS INDEX (BMI) OF 29 TO 29.9 IN ADULT: Primary | ICD-10-CM

## 2024-10-22 DIAGNOSIS — D75.1 ERYTHROCYTOSIS: ICD-10-CM

## 2024-10-22 DIAGNOSIS — E03.9 HYPOTHYROIDISM, UNSPECIFIED TYPE: ICD-10-CM

## 2024-10-22 DIAGNOSIS — I10 PRIMARY HYPERTENSION: ICD-10-CM

## 2024-10-22 DIAGNOSIS — E78.2 MIXED HYPERLIPIDEMIA: ICD-10-CM

## 2024-10-22 DIAGNOSIS — M46.1 SACROILIITIS, NOT ELSEWHERE CLASSIFIED (CMS-HCC): ICD-10-CM

## 2024-10-22 DIAGNOSIS — E55.9 VITAMIN D DEFICIENCY: ICD-10-CM

## 2024-10-22 DIAGNOSIS — E46 PROTEIN-CALORIE MALNUTRITION, UNSPECIFIED SEVERITY (MULTI): ICD-10-CM

## 2024-10-22 DIAGNOSIS — B00.1 COLD SORE: ICD-10-CM

## 2024-10-22 PROCEDURE — 1159F MED LIST DOCD IN RCRD: CPT | Performed by: INTERNAL MEDICINE

## 2024-10-22 PROCEDURE — 1036F TOBACCO NON-USER: CPT | Performed by: INTERNAL MEDICINE

## 2024-10-22 PROCEDURE — 3078F DIAST BP <80 MM HG: CPT | Performed by: INTERNAL MEDICINE

## 2024-10-22 PROCEDURE — 3074F SYST BP LT 130 MM HG: CPT | Performed by: INTERNAL MEDICINE

## 2024-10-22 PROCEDURE — 3008F BODY MASS INDEX DOCD: CPT | Performed by: INTERNAL MEDICINE

## 2024-10-22 PROCEDURE — G2211 COMPLEX E/M VISIT ADD ON: HCPCS | Performed by: INTERNAL MEDICINE

## 2024-10-22 PROCEDURE — 99214 OFFICE O/P EST MOD 30 MIN: CPT | Performed by: INTERNAL MEDICINE

## 2024-10-22 PROCEDURE — 1123F ACP DISCUSS/DSCN MKR DOCD: CPT | Performed by: INTERNAL MEDICINE

## 2024-10-22 PROCEDURE — 1160F RVW MEDS BY RX/DR IN RCRD: CPT | Performed by: INTERNAL MEDICINE

## 2024-10-22 RX ORDER — VALACYCLOVIR HYDROCHLORIDE 1 G/1
TABLET, FILM COATED ORAL
Qty: 24 TABLET | Refills: 1 | Status: SHIPPED | OUTPATIENT
Start: 2024-10-22

## 2024-10-22 RX ORDER — AMLODIPINE BESYLATE 2.5 MG/1
2.5 TABLET ORAL DAILY
Qty: 90 TABLET | Refills: 2 | Status: SHIPPED | OUTPATIENT
Start: 2024-10-22

## 2024-10-22 ASSESSMENT — ENCOUNTER SYMPTOMS
TREMORS: 0
SORE THROAT: 0
VOICE CHANGE: 0
CONSTIPATION: 0
MUSCULOSKELETAL NEGATIVE: 1
CARDIOVASCULAR NEGATIVE: 1
JOINT SWELLING: 0
AGITATION: 0
SPEECH DIFFICULTY: 0
DYSURIA: 0
NECK STIFFNESS: 0
NERVOUS/ANXIOUS: 0
HALLUCINATIONS: 0
COLOR CHANGE: 0
BLOOD IN STOOL: 0
WHEEZING: 0
ABDOMINAL PAIN: 0
FACIAL ASYMMETRY: 0
ENDOCRINE NEGATIVE: 1
VOMITING: 0
BACK PAIN: 0
SLEEP DISTURBANCE: 0
HEADACHES: 0
ALLERGIC/IMMUNOLOGIC NEGATIVE: 1
CHEST TIGHTNESS: 0
POLYDIPSIA: 0
WEAKNESS: 0
GASTROINTESTINAL NEGATIVE: 1
NECK PAIN: 0
LIGHT-HEADEDNESS: 0
EYE REDNESS: 0
MYALGIAS: 0
STRIDOR: 0
NUMBNESS: 0
DIFFICULTY URINATING: 0
SINUS PRESSURE: 0
CONFUSION: 0
EYE DISCHARGE: 0
FREQUENCY: 0
FLANK PAIN: 0
WOUND: 0
ADENOPATHY: 0
UNEXPECTED WEIGHT CHANGE: 0
TROUBLE SWALLOWING: 0
CONSTITUTIONAL NEGATIVE: 1
HEMATOLOGIC/LYMPHATIC NEGATIVE: 1
COUGH: 0
APPETITE CHANGE: 0
EYES NEGATIVE: 1
SINUS PAIN: 0
DIZZINESS: 0
ACTIVITY CHANGE: 0
ARTHRALGIAS: 0
PALPITATIONS: 0
EYE PAIN: 0
PSYCHIATRIC NEGATIVE: 1
SEIZURES: 0
NEUROLOGICAL NEGATIVE: 1
SHORTNESS OF BREATH: 0
RESPIRATORY NEGATIVE: 1
POLYPHAGIA: 0
BRUISES/BLEEDS EASILY: 0
NAUSEA: 0
DIARRHEA: 0

## 2024-10-22 NOTE — PROGRESS NOTES
Subjective   Patient ID: Mara Montoya is a 74 y.o. female who presents for Follow-up (Pt is here due to a 3 month follow up).  HPI    This is 72 yo female with complex medical problems including HTN, HLD, Hypothyroidism, osteopenia, back pain, lumbar spondylosis, right leg pain, varicose veins.     We reviewed and discussed her medical conditions during today's visit.    She has been following with vein specialist Dr. Sahni, underwent ambulatory phlebectomy 2 weeks ago, tolerated procedure well, has been wearing compression stockings since and seem to be feeling pretty good.  Awaiting to see longer term improvement.     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    She has been tolerating treatment with Zepbound well, except occasional constipation which she has been managing with diet adjustments.  Intentionally lost about 40 lbs.      Patient has been following with [facility] pharmacist, I have been communicating with pharmacist regularly and supervising treatment with Zepbound.  We will start slowly reducing dose of Zepbound to minimum maintenance dose which will control her appetite, allow to maintain weight without further weight loss.      She has been exercising regularly at [facility].    BP currently well controlled with Amlodipine 2.5 mg daily.     Due to  c/o right lower leg pain when walking  saw pain management Dr. Bradley.  Received diagnostic nerve block in March 2024.  No significant improvement noted.    We reviewed and discussed details of recent blood work: CBC, CMP, TSH, Lipid panel, Hb A1c, Vit D done in Feb 2024. Results within acceptable range.  We will obtain blood work after patient lost weight.     Review of Systems   Constitutional: Negative.  Negative for activity change, appetite change and unexpected weight change.   HENT: Negative.  Negative for  "congestion, ear discharge, ear pain, hearing loss, mouth sores, nosebleeds, sinus pressure, sinus pain, sore throat, trouble swallowing and voice change.    Eyes: Negative.  Negative for pain, discharge, redness and visual disturbance.   Respiratory: Negative.  Negative for cough, chest tightness, shortness of breath, wheezing and stridor.    Cardiovascular: Negative.  Negative for chest pain, palpitations and leg swelling.   Gastrointestinal: Negative.  Negative for abdominal pain, blood in stool, constipation, diarrhea, nausea and vomiting.   Endocrine: Negative.  Negative for polydipsia, polyphagia and polyuria.   Genitourinary: Negative.  Negative for difficulty urinating, dysuria, flank pain, frequency and urgency.   Musculoskeletal: Negative.  Negative for arthralgias, back pain, gait problem, joint swelling, myalgias, neck pain and neck stiffness.   Skin: Negative.  Negative for color change, rash and wound.   Allergic/Immunologic: Negative.  Negative for environmental allergies, food allergies and immunocompromised state.   Neurological: Negative.  Negative for dizziness, tremors, seizures, syncope, facial asymmetry, speech difficulty, weakness, light-headedness, numbness and headaches.   Hematological: Negative.  Negative for adenopathy. Does not bruise/bleed easily.   Psychiatric/Behavioral: Negative.  Negative for agitation, behavioral problems, confusion, hallucinations, sleep disturbance and suicidal ideas. The patient is not nervous/anxious.    All other systems reviewed and are negative.      Objective     Review of systems was performed and all systems were negative except what in HPI    /60 (BP Location: Right arm, Patient Position: Sitting, BP Cuff Size: Adult)   Temp 36.7 °C (98 °F) (Temporal)   Resp 16   Ht 1.6 m (5' 3\")   Wt 52.8 kg (116 lb 6.4 oz)   BMI 20.62 kg/m²    Physical Exam  Vitals and nursing note reviewed.   Constitutional:       General: She is not in acute distress.     " Appearance: Normal appearance.   HENT:      Head: Normocephalic and atraumatic.      Right Ear: External ear normal.      Left Ear: External ear normal.      Nose: Nose normal. No congestion or rhinorrhea.   Eyes:      General:         Right eye: No discharge.         Left eye: No discharge.      Extraocular Movements: Extraocular movements intact.      Conjunctiva/sclera: Conjunctivae normal.      Pupils: Pupils are equal, round, and reactive to light.   Cardiovascular:      Rate and Rhythm: Normal rate and regular rhythm.      Pulses: Normal pulses.      Heart sounds: Normal heart sounds. No murmur heard.     No friction rub. No gallop.   Pulmonary:      Effort: Pulmonary effort is normal. No respiratory distress.      Breath sounds: Normal breath sounds. No stridor. No wheezing, rhonchi or rales.   Chest:      Chest wall: No tenderness.   Abdominal:      General: Bowel sounds are normal.      Palpations: Abdomen is soft. There is no mass.      Tenderness: There is no abdominal tenderness. There is no guarding or rebound.   Musculoskeletal:         General: No swelling or deformity. Normal range of motion.      Cervical back: Normal range of motion and neck supple. No rigidity or tenderness.      Right lower leg: No edema.      Left lower leg: No edema.   Lymphadenopathy:      Cervical: No cervical adenopathy.   Skin:     General: Skin is warm and dry.      Coloration: Skin is not jaundiced.      Findings: No bruising or erythema.   Neurological:      General: No focal deficit present.      Mental Status: She is alert and oriented to person, place, and time. Mental status is at baseline.      Cranial Nerves: No cranial nerve deficit.      Motor: No weakness.      Coordination: Coordination normal.      Gait: Gait normal.   Psychiatric:         Mood and Affect: Mood normal.         Behavior: Behavior normal.         Thought Content: Thought content normal.         Judgment: Judgment normal.         Assessment/Plan    Problem List Items Addressed This Visit             ICD-10-CM       Cardiac and Vasculature    Mixed hyperlipidemia E78.2     Low cholesterol and low carbohydrate diet is advised.          Relevant Orders    Comprehensive Metabolic Panel    Lipid Panel    Primary hypertension I10     Continue Amlodipine  2.5 mg daily and monitor BP closely.          Relevant Medications    amLODIPine (Norvasc) 2.5 mg tablet       Endocrine/Metabolic    Hypothyroidism E03.9     Clinically stable. F/up with endocrinology.          Relevant Orders    T3    TSH    T4, free    Vitamin D deficiency E55.9    Overweight with body mass index (BMI) of 29 to 29.9 in adult - Primary E66.3, Z68.29    Relevant Medications    tirzepatide, weight loss, (Zepbound) 10 mg/0.5 mL injection    Protein-calorie malnutrition, unspecified severity (Multi) E46     Clinically stable. Will monitor.             Hematology and Neoplasia    Erythrocytosis D75.1    Relevant Orders    CBC       Infectious Diseases    Cold sore B00.1    Relevant Medications    valACYclovir (Valtrex) 1 gram tablet       Musculoskeletal and Injuries    Sacroiliitis, not elsewhere classified (CMS-HCC) M46.1     Clinically stable. Will monitor.         It was a pleasure to see you today.  I would like to remind you about importance of a healthy lifestyle in order to improve your well-being and live longer.  Try to engage in physical activities for at least 150 minutes per week.  Eat about 10 servings of fruits and vegetables daily. My advice is 2 servings of fruits and 8 servings of vegetables.  For vegetables choose at least half of them green and at least half of them fresh.  Please avoid sugar, salt, fried food and saturated fat.    I spent a total of 35 minutes on the date of service for follow up visit, which included preparing to see the patient, face-to-face patient care, completing clinical documentation, obtaining and/or reviewing separately obtained history, performing a  medically appropriate examination, counseling and educating the patient/family/caregiver, ordering medications, tests, or procedures, communicating with other health care providers (not separately reported), independently interpreting results (not separately reported), communicating results to the patient/family/caregiver, and care coordination (not separately reported).      F/up in 3 months or sooner if needed.

## 2024-11-01 NOTE — PROGRESS NOTES
Patient is sent at the request of Liza Hebert M* for my opinion regarding weight management.  My final recommendations will be communicated back to the requesting provider by way of shared medical record.    Subjective     Mara Montoya is a 74 y.o. female who is overweight as evidenced by her most recent BMI of 24.33 kg/m2 as of 24 complicated by hyperlipidemia and hypertension. She was started on Wegovy 0.25 mg once weekly by Dr. Mckeon on 10/19/23. At this time her BMI was 29.63 kg/m2.     At last pharmacy visit patient's Zepbound was decreased down to 12.5 mg once weekly but patient preferred to give the dose every 10 days. She since saw Dr. Mckeon on 10/22/24 and her dose was reduced down further to 10 mg once weekly. We are following-up today to see how she has done with the dose reduction.     Today states she has still been taking the 12.5 mg every 10 days to finish out her pens, she still has one 12.5 mg dose left. Will then switch to the 10 mg pens.    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    Past Surgical History:  She has a past surgical history that includes  section, classic (10/28/2013) and Knee surgery (10/28/2013).    Social History:  She reports that she has never smoked. She has never been exposed to tobacco smoke. She has never used smokeless tobacco. She reports current alcohol use of about 7.0 standard drinks of alcohol per week. She reports that she does not use drugs.    Family History:  Family History    Problem Relation Name Age of Onset    Hypertension Mother      Stroke Mother      Hypertension Father      Crohn's disease Brother      Macular degeneration Mother's Sister       Allergies:  Gabapentin    Current diet:   Breakfast: egg with toast, coffee  Lunch: tomato with a slice of bread   Dinner: salmon, sweet potatoes, green vegetables   Snacks: not really snacking, is does may have popcorn   Fluids: water, tea at night, has been working on increasing fluid intake   Will also have oranges occasionally   Has been eating more hardboiled eggs recently   Does note some increased hunger since reducing the dose down but is taking every 10 days instead of every 7 days.     Current exercise:   Has been working on increasing activity   Is using physical therapy exercises at home   Yoga once a week  Pilates once a week   Notes she has had some leg pain when doing a lot of cardio exercises so has cut back on this     Previous Attempts at Weight Loss:   Was in a program with [facility] with  and son to eat a plant based diet, states this has led to some of their current dietary habits  Has tried intermittent fasting   Has also tried the Mediterranean diet    Rentho scale - states that this works with an azucena on her phone to calculate BMI and give dietary recommendations     The patient does not have a known family history of diabetes.    Adverse Effects:   None reported today    Objective     Weight Management Pharmacotherapy:  Zepbound 10 mg under the skin once weekly (reduced from 12.5 mg by Dr. Mckeon on 10/22/24)    Historical Weight Management Pharmacotherapy:   Wegovy 1 mg (switched to Zepbound due to it being cheaper)    Pertinent PMH Review:  PMH of Pancreatitis: No  PMH of Retinopathy: No  PMH of MTC: No  PMH of MEN 2: No    Lab Review  Lab Results   Component Value Date    BILITOT 0.8 02/17/2024    CALCIUM 9.6 02/17/2024    CO2 27 02/17/2024     (H) 02/17/2024    CREATININE 0.75 02/17/2024    GLUCOSE 87  "02/17/2024    ALKPHOS 31 (L) 02/17/2024    K 4.0 02/17/2024    PROT 6.1 (L) 02/17/2024     02/17/2024    AST 15 02/17/2024    ALT 15 02/17/2024    BUN 12 02/17/2024    ANIONGAP 11 02/17/2024    MG 1.70 05/18/2021    PHOS 2.6 05/18/2021    ALBUMIN 4.2 02/17/2024    GFRF 89 07/14/2023     Lab Results   Component Value Date    TRIG 84 02/17/2024    CHOL 206 (H) 02/17/2024    LDLCALC 133 (H) 02/17/2024    HDL 56.0 02/17/2024     No results found for: \"HGBA1C\"  No components found for: \"UACR\"  The 10-year ASCVD risk score (Josi TONY, et al., 2019) is: 13%    Values used to calculate the score:      Age: 74 years      Sex: Female      Is Non- : No      Diabetic: No      Tobacco smoker: No      Systolic Blood Pressure: 110 mmHg      Is BP treated: Yes      HDL Cholesterol: 56 mg/dL      Total Cholesterol: 206 mg/dL    Drug Interactions:  No significant drug-drug interactions exist that require an adjustment to medication therapy.    Wt Readings from Last 6 Encounters:   10/22/24 52.8 kg (116 lb 6.4 oz)   10/17/24 52.6 kg (116 lb)   08/14/24 53.3 kg (117 lb 6.4 oz)   07/18/24 54.9 kg (121 lb)   05/13/24 60.3 kg (133 lb)   05/10/24 60.6 kg (133 lb 9.6 oz)     Estimated body mass index is 20.62 kg/m² as calculated from the following:    Height as of 10/22/24: 1.6 m (5' 3\").    Weight as of 10/22/24: 52.8 kg (116 lb 6.4 oz).    Patient Reported Home Weights:   02/21/24: 146 lbs   03/18/24: 143 lbs   04/22/24: 134 lbs   06/17/24: 128 lbs (calculated BMI 23.4 kg/m2)  07/15/24: 120.2 lbs (calculated BMI 22 kg/m2)  08/12/24: 119 lbs (calculated BMI 21.8 kg/m2)  09/09/24: 115 lbs (calculated BMI 20.4 kg/m2)  11/04/24: 117.8 lbs calculated BMI 20.9 kg/m2)    Assessment/Plan   Problem List Items Addressed This Visit       Overweight with body mass index (BMI) of 29 to 29.9 in adult - Primary     Current regimen includes Zepbound 12.5 mg under the skin every 10 days (different from prescribed at every 7 " "days). Patient's current weight reported as 117.8 lbs. Starting weight was 162 lbs on 10/19/23. Had lost 43 lbs (~29% of TBW) since starting therapy plan.     At last appointment with Dr. Mckeon patient was given prescription for 10 mg once weekly. She has been continuing to give 12.5 mg every 10 days. She has one more dose left to give 11/13 and then will switch to the 10 mg once weekly dose.     Patient does note increased \"food noise\" and some increased hunger since continuing at 12.5 mg every 10 days. Discussed with patient that when reducing down to the 10 mg dose she can switch back to taking every 7 days to see if this helps keep that to a minimum since the medication does wear off after 7 days.    Medication Changes:  CONTINUE:  Zepbound 12.5 mg under the skin every 10 days (for one more dose on 11/13)    Then:     DECREASE:   Zepbound 10 mg under the skin once weekly (decreased from 7.5 mg once weekly)  Will start ~11/23 and will likely give once weekly     Future Considerations:  If she continues to have increased hunger we will likely continue at 10 mg once weekly for another month at follow-up  If she's doing ok dosing at once weekly we could discuss reducing back down to 7.5 mg once weekly too     Monitoring and Education:  Patient educated that ideally Zepbound should be dosed at once weekly instead of every 10 days, will likely switch back to 7 days with reduced dose  We will continue to monitor her weight and for any new/worsening side effects at each encounter    We will plan to follow-up in ~5 weeks to see how she has done with the reduction to 10 mg once weekly of Zepbound. She was encouraged to reach out with any questions and/or concerns.          Relevant Orders    Referral to Clinical Pharmacy     Pharmacy Follow-Up: 12/09/2024   PCP Follow-Up: 01/07/2025    Iglesia Watson PharmD     Continue all meds under the continuation of care with the referring provider and clinical pharmacy team.  "

## 2024-11-04 ENCOUNTER — APPOINTMENT (OUTPATIENT)
Dept: PHARMACY | Facility: HOSPITAL | Age: 74
End: 2024-11-04
Payer: MEDICARE

## 2024-11-04 DIAGNOSIS — E66.3 OVERWEIGHT WITH BODY MASS INDEX (BMI) OF 29 TO 29.9 IN ADULT: Primary | ICD-10-CM

## 2024-11-04 RX ORDER — AMOXICILLIN 500 MG/1
4 CAPSULE ORAL
COMMUNITY
Start: 2024-10-31

## 2024-11-04 NOTE — ASSESSMENT & PLAN NOTE
"Current regimen includes Zepbound 12.5 mg under the skin every 10 days (different from prescribed at every 7 days). Patient's current weight reported as 117.8 lbs. Starting weight was 162 lbs on 10/19/23. Had lost 43 lbs (~29% of TBW) since starting therapy plan.     At last appointment with Dr. Mckeon patient was given prescription for 10 mg once weekly. She has been continuing to give 12.5 mg every 10 days. She has one more dose left to give 11/13 and then will switch to the 10 mg once weekly dose.     Patient does note increased \"food noise\" and some increased hunger since continuing at 12.5 mg every 10 days. Discussed with patient that when reducing down to the 10 mg dose she can switch back to taking every 7 days to see if this helps keep that to a minimum since the medication does wear off after 7 days.    Medication Changes:  CONTINUE:  Zepbound 12.5 mg under the skin every 10 days (for one more dose on 11/13)    Then:     DECREASE:   Zepbound 10 mg under the skin once weekly (decreased from 7.5 mg once weekly)  Will start ~11/23 and will likely give once weekly     Future Considerations:  If she continues to have increased hunger we will likely continue at 10 mg once weekly for another month at follow-up  If she's doing ok dosing at once weekly we could discuss reducing back down to 7.5 mg once weekly too     Monitoring and Education:  Patient educated that ideally Zepbound should be dosed at once weekly instead of every 10 days, will likely switch back to 7 days with reduced dose  We will continue to monitor her weight and for any new/worsening side effects at each encounter    We will plan to follow-up in ~5 weeks to see how she has done with the reduction to 10 mg once weekly of Zepbound. She was encouraged to reach out with any questions and/or concerns.   "

## 2024-12-03 DIAGNOSIS — R09.82 POST-NASAL DRAINAGE: ICD-10-CM

## 2024-12-09 ENCOUNTER — APPOINTMENT (OUTPATIENT)
Dept: PHARMACY | Facility: HOSPITAL | Age: 74
End: 2024-12-09
Payer: MEDICARE

## 2024-12-09 DIAGNOSIS — E66.3 OVERWEIGHT WITH BODY MASS INDEX (BMI) OF 29 TO 29.9 IN ADULT: Primary | ICD-10-CM

## 2024-12-09 RX ORDER — IPRATROPIUM BROMIDE 21 UG/1
2 SPRAY, METERED NASAL 3 TIMES DAILY
Qty: 90 ML | Refills: 3 | Status: SHIPPED | OUTPATIENT
Start: 2024-12-09 | End: 2025-12-09

## 2024-12-09 NOTE — PROGRESS NOTES
"Patient is sent at the request of Liza Hebert M* for my opinion regarding weight management.  My final recommendations will be communicated back to the requesting provider by way of shared medical record.    Subjective     Mara Montoya is a 74 y.o. female who is overweight as evidenced by her most recent BMI of 24.33 kg/m2 as of 24 complicated by hyperlipidemia and hypertension. She was started on Wegovy 0.25 mg once weekly by Dr. Mckeon on 10/19/23. At this time her BMI was 29.63 kg/m2.     At last pharmacy visit patient had been taking Zepbound 12.5 mg every 10 days and was planning to switch to the 10 mg dose. She did note increased \"food noise\" with the 12.5 mg every 10 days so she was planning to give the 10 mg weekly to help with that. We are following-up today to see how she has done with the 10 mg once weekly dose and to discuss potentially reducing the dose down further.     Still reports increased food noise with the 10 mg dose which she states she is taking every 10 days.     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    Past Surgical History:  She has a past surgical history that includes  section, classic (10/28/2013) and Knee surgery (10/28/2013).    Social History:  She reports that she has never smoked. She has never been exposed to tobacco smoke. She has never used smokeless tobacco. She reports current alcohol use of about 7.0 standard drinks of alcohol per week. She reports that she does not use drugs.    Family " History:  Family History   Problem Relation Name Age of Onset    Hypertension Mother      Stroke Mother      Hypertension Father      Crohn's disease Brother      Macular degeneration Mother's Sister       Allergies:  Gabapentin    Current diet:   Breakfast: egg with toast, coffee  Lunch: tomato with a slice of bread   Dinner: salmon, sweet potatoes, green vegetables   Snacks: not really snacking, is does may have popcorn   Fluids: water, tea at night, has been working on increasing fluid intake   Will also have oranges occasionally   Has been eating more hardboiled eggs recently   Does note some increased hunger since reducing the dose down but is taking every 10 days instead of every 7 days.     Current exercise:   Has been working on increasing activity   Is using physical therapy exercises at home   Yoga once a week  Pilates once a week   Notes she has had some leg pain when doing a lot of cardio exercises so has cut back on this     Previous Attempts at Weight Loss:   Was in a program with [facility] with  and son to eat a plant based diet, states this has led to some of their current dietary habits  Has tried intermittent fasting   Has also tried the Mediterranean diet    Rentho scale - states that this works with an azucena on her phone to calculate BMI and give dietary recommendations     The patient does not have a known family history of diabetes.    Adverse Effects:   None reported today    Objective     Weight Management Pharmacotherapy:  Zepbound 10 mg under the skin once weekly (is currently taking every 10 days)   Went to [place] for 2 weeks and didn't take it while there   Has 1 pen left at home for her dose tomorrow     Historical Weight Management Pharmacotherapy:   Wegovy 1 mg (switched to Zepbound due to it being cheaper)    Pertinent PMH Review:  PMH of Pancreatitis: No  PMH of Retinopathy: No  PMH of MTC: No  PMH of MEN 2: No    Lab Review  Lab Results   Component Value Date    BILITOT 0.8 02/17/2024  "   CALCIUM 9.6 02/17/2024    CO2 27 02/17/2024     (H) 02/17/2024    CREATININE 0.75 02/17/2024    GLUCOSE 87 02/17/2024    ALKPHOS 31 (L) 02/17/2024    K 4.0 02/17/2024    PROT 6.1 (L) 02/17/2024     02/17/2024    AST 15 02/17/2024    ALT 15 02/17/2024    BUN 12 02/17/2024    ANIONGAP 11 02/17/2024    MG 1.70 05/18/2021    PHOS 2.6 05/18/2021    ALBUMIN 4.2 02/17/2024    GFRF 89 07/14/2023     Lab Results   Component Value Date    TRIG 84 02/17/2024    CHOL 206 (H) 02/17/2024    LDLCALC 133 (H) 02/17/2024    HDL 56.0 02/17/2024     No results found for: \"HGBA1C\"  No components found for: \"UACR\"  The 10-year ASCVD risk score (Josi TONY, et al., 2019) is: 13%    Values used to calculate the score:      Age: 74 years      Sex: Female      Is Non- : No      Diabetic: No      Tobacco smoker: No      Systolic Blood Pressure: 110 mmHg      Is BP treated: Yes      HDL Cholesterol: 56 mg/dL      Total Cholesterol: 206 mg/dL    Drug Interactions:  No significant drug-drug interactions exist that require an adjustment to medication therapy.    Wt Readings from Last 6 Encounters:   10/22/24 52.8 kg (116 lb 6.4 oz)   10/17/24 52.6 kg (116 lb)   08/14/24 53.3 kg (117 lb 6.4 oz)   07/18/24 54.9 kg (121 lb)   05/13/24 60.3 kg (133 lb)   05/10/24 60.6 kg (133 lb 9.6 oz)     Estimated body mass index is 20.62 kg/m² as calculated from the following:    Height as of 10/22/24: 1.6 m (5' 3\").    Weight as of 10/22/24: 52.8 kg (116 lb 6.4 oz).    Patient Reported Home Weights:   02/21/24: 146 lbs   03/18/24: 143 lbs   04/22/24: 134 lbs   06/17/24: 128 lbs (calculated BMI 23.4 kg/m2)  07/15/24: 120.2 lbs (calculated BMI 22 kg/m2)  08/12/24: 119 lbs (calculated BMI 21.8 kg/m2)  09/09/24: 115 lbs (calculated BMI 20.4 kg/m2)  11/04/24: 117.8 lbs calculated BMI 20.9 kg/m2)  12/09/24: 115 lbs (calculated BMI 20.4 kg/m2)     Assessment/Plan   Problem List Items Addressed This Visit       Overweight with body " "mass index (BMI) of 29 to 29.9 in adult - Primary     Current regimen includes Zepbound 10 mg under the skin every 10 days (different from prescribed at every 7 days). Patient's current weight reported as 115 lbs. Starting weight was 162 lbs on 10/19/23. Has lost 43 lbs (~29% of TBW) since starting therapy plan.     At last encounter patient was to decrease from 12.5 mg every 10 days to 10 mg every 7 days. Today patient reports she has been taking the 10 mg dose every 10 days. She does note increased \"food noise\" with the dose reduction. At this time patient would like to stay at 10 mg once weekly during the holidays so she can work on managing the food noise and maintaining her eating habits instead of reducing her dose further.     Medication Changes:  CONTINUE:  Zepbound 10 mg under the skin every 10 days     Future Considerations:  Discussed with patient that if she is maintaining her weight lost ideally we would want to reduce the dose down further. Informed patient that goal would be to reduce down to 7.5 mg once weekly at next follow-up.     Monitoring and Education:  Patient is aware medication is FDA approved to be given once weekly. Patient's increased food noise could be improved by reducing dosing frequency back to every 7 days. Patient would like to continue giving at every 10 days to help her learn to manage the feeling of hunger and the food noise that she has started to feel more of.   Medications are dosed appropriately per most recent renal and hepatic function    We will plan to follow-up in ~1.5 months to see how she has done with continuing at Zepbound 10 mg every 10 days. She was encouraged to reach out with any questions and/or concerns prior to then.          Relevant Medications    tirzepatide, weight loss, (Zepbound) 10 mg/0.5 mL injection    Other Relevant Orders    Referral to Clinical Pharmacy     Pharmacy Follow-Up: 1/20/2025   PCP Follow-Up: 01/07/2025    Iglesia Watson, Arnoldo "     Continue all meds under the continuation of care with the referring provider and clinical pharmacy team.

## 2024-12-10 NOTE — ASSESSMENT & PLAN NOTE
"Current regimen includes Zepbound 10 mg under the skin every 10 days (different from prescribed at every 7 days). Patient's current weight reported as 115 lbs. Starting weight was 162 lbs on 10/19/23. Has lost 43 lbs (~29% of TBW) since starting therapy plan.     At last encounter patient was to decrease from 12.5 mg every 10 days to 10 mg every 7 days. Today patient reports she has been taking the 10 mg dose every 10 days. She does note increased \"food noise\" with the dose reduction. At this time patient would like to stay at 10 mg once weekly during the holidays so she can work on managing the food noise and maintaining her eating habits instead of reducing her dose further.     Medication Changes:  CONTINUE:  Zepbound 10 mg under the skin every 10 days     Future Considerations:  Discussed with patient that if she is maintaining her weight lost ideally we would want to reduce the dose down further. Informed patient that goal would be to reduce down to 7.5 mg once weekly at next follow-up.     Monitoring and Education:  Patient is aware medication is FDA approved to be given once weekly. Patient's increased food noise could be improved by reducing dosing frequency back to every 7 days. Patient would like to continue giving at every 10 days to help her learn to manage the feeling of hunger and the food noise that she has started to feel more of.   Medications are dosed appropriately per most recent renal and hepatic function    We will plan to follow-up in ~1.5 months to see how she has done with continuing at Zepbound 10 mg every 10 days. She was encouraged to reach out with any questions and/or concerns prior to then.   "

## 2024-12-31 ENCOUNTER — LAB (OUTPATIENT)
Dept: LAB | Facility: LAB | Age: 74
End: 2024-12-31
Payer: MEDICARE

## 2024-12-31 DIAGNOSIS — E78.2 MIXED HYPERLIPIDEMIA: ICD-10-CM

## 2024-12-31 DIAGNOSIS — E03.9 HYPOTHYROIDISM, UNSPECIFIED TYPE: ICD-10-CM

## 2024-12-31 DIAGNOSIS — D75.1 ERYTHROCYTOSIS: ICD-10-CM

## 2024-12-31 LAB
ALBUMIN SERPL BCP-MCNC: 3.8 G/DL (ref 3.4–5)
ALP SERPL-CCNC: 41 U/L (ref 33–136)
ALT SERPL W P-5'-P-CCNC: 11 U/L (ref 7–45)
ANION GAP SERPL CALC-SCNC: 12 MMOL/L (ref 10–20)
AST SERPL W P-5'-P-CCNC: 14 U/L (ref 9–39)
BILIRUB SERPL-MCNC: 0.9 MG/DL (ref 0–1.2)
BUN SERPL-MCNC: 11 MG/DL (ref 6–23)
CALCIUM SERPL-MCNC: 8.8 MG/DL (ref 8.6–10.6)
CHLORIDE SERPL-SCNC: 106 MMOL/L (ref 98–107)
CHOLEST SERPL-MCNC: 201 MG/DL (ref 0–199)
CHOLESTEROL/HDL RATIO: 2.8
CO2 SERPL-SCNC: 27 MMOL/L (ref 21–32)
CREAT SERPL-MCNC: 0.63 MG/DL (ref 0.5–1.05)
EGFRCR SERPLBLD CKD-EPI 2021: >90 ML/MIN/1.73M*2
ERYTHROCYTE [DISTWIDTH] IN BLOOD BY AUTOMATED COUNT: 12.7 % (ref 11.5–14.5)
GLUCOSE SERPL-MCNC: 80 MG/DL (ref 74–99)
HCT VFR BLD AUTO: 48.3 % (ref 36–46)
HDLC SERPL-MCNC: 72.9 MG/DL
HGB BLD-MCNC: 16.5 G/DL (ref 12–16)
LDLC SERPL CALC-MCNC: 112 MG/DL
MCH RBC QN AUTO: 31.7 PG (ref 26–34)
MCHC RBC AUTO-ENTMCNC: 34.2 G/DL (ref 32–36)
MCV RBC AUTO: 93 FL (ref 80–100)
NON HDL CHOLESTEROL: 128 MG/DL (ref 0–149)
NRBC BLD-RTO: 0 /100 WBCS (ref 0–0)
PLATELET # BLD AUTO: 241 X10*3/UL (ref 150–450)
POTASSIUM SERPL-SCNC: 4.5 MMOL/L (ref 3.5–5.3)
PROT SERPL-MCNC: 6 G/DL (ref 6.4–8.2)
RBC # BLD AUTO: 5.2 X10*6/UL (ref 4–5.2)
SODIUM SERPL-SCNC: 140 MMOL/L (ref 136–145)
T3 SERPL-MCNC: 79 NG/DL (ref 60–200)
T4 FREE SERPL-MCNC: 1.3 NG/DL (ref 0.78–1.48)
TRIGL SERPL-MCNC: 82 MG/DL (ref 0–149)
TSH SERPL-ACNC: 0.29 MIU/L (ref 0.44–3.98)
VLDL: 16 MG/DL (ref 0–40)
WBC # BLD AUTO: 4.1 X10*3/UL (ref 4.4–11.3)

## 2024-12-31 PROCEDURE — 85027 COMPLETE CBC AUTOMATED: CPT

## 2024-12-31 PROCEDURE — 84443 ASSAY THYROID STIM HORMONE: CPT

## 2024-12-31 PROCEDURE — 80061 LIPID PANEL: CPT

## 2024-12-31 PROCEDURE — 84480 ASSAY TRIIODOTHYRONINE (T3): CPT

## 2024-12-31 PROCEDURE — 84439 ASSAY OF FREE THYROXINE: CPT

## 2024-12-31 PROCEDURE — 80053 COMPREHEN METABOLIC PANEL: CPT

## 2025-01-02 NOTE — RESULT ENCOUNTER NOTE
Your recent lab work was acceptable. All results may not be within normal range but they are not clinically significant at this time. We will discuss details during your next office visit. Please keep your next appointment as scheduled. Dr. Mckeon

## 2025-01-07 ENCOUNTER — APPOINTMENT (OUTPATIENT)
Dept: PRIMARY CARE | Facility: CLINIC | Age: 75
End: 2025-01-07
Payer: MEDICARE

## 2025-01-07 VITALS
OXYGEN SATURATION: 99 % | SYSTOLIC BLOOD PRESSURE: 132 MMHG | TEMPERATURE: 98 F | DIASTOLIC BLOOD PRESSURE: 70 MMHG | RESPIRATION RATE: 16 BRPM | HEIGHT: 63 IN | HEART RATE: 85 BPM | BODY MASS INDEX: 20.84 KG/M2 | WEIGHT: 117.6 LBS

## 2025-01-07 DIAGNOSIS — E03.9 HYPOTHYROIDISM, UNSPECIFIED TYPE: ICD-10-CM

## 2025-01-07 DIAGNOSIS — E66.3 OVERWEIGHT WITH BODY MASS INDEX (BMI) OF 29 TO 29.9 IN ADULT: ICD-10-CM

## 2025-01-07 DIAGNOSIS — Z00.00 ROUTINE GENERAL MEDICAL EXAMINATION AT HEALTH CARE FACILITY: Primary | ICD-10-CM

## 2025-01-07 DIAGNOSIS — Z12.83 SKIN CANCER SCREENING: ICD-10-CM

## 2025-01-07 DIAGNOSIS — D75.1 ERYTHROCYTOSIS: ICD-10-CM

## 2025-01-07 DIAGNOSIS — M79.661 PAIN IN RIGHT LOWER LEG: ICD-10-CM

## 2025-01-07 DIAGNOSIS — Z12.31 ENCOUNTER FOR SCREENING MAMMOGRAM FOR BREAST CANCER: ICD-10-CM

## 2025-01-07 DIAGNOSIS — Z78.0 ASYMPTOMATIC MENOPAUSAL STATE: ICD-10-CM

## 2025-01-07 DIAGNOSIS — I10 PRIMARY HYPERTENSION: ICD-10-CM

## 2025-01-07 PROCEDURE — 99214 OFFICE O/P EST MOD 30 MIN: CPT | Performed by: INTERNAL MEDICINE

## 2025-01-07 PROCEDURE — 3008F BODY MASS INDEX DOCD: CPT | Performed by: INTERNAL MEDICINE

## 2025-01-07 PROCEDURE — 1159F MED LIST DOCD IN RCRD: CPT | Performed by: INTERNAL MEDICINE

## 2025-01-07 PROCEDURE — 1158F ADVNC CARE PLAN TLK DOCD: CPT | Performed by: INTERNAL MEDICINE

## 2025-01-07 PROCEDURE — 3075F SYST BP GE 130 - 139MM HG: CPT | Performed by: INTERNAL MEDICINE

## 2025-01-07 PROCEDURE — G0439 PPPS, SUBSEQ VISIT: HCPCS | Performed by: INTERNAL MEDICINE

## 2025-01-07 PROCEDURE — 1170F FXNL STATUS ASSESSED: CPT | Performed by: INTERNAL MEDICINE

## 2025-01-07 PROCEDURE — 1036F TOBACCO NON-USER: CPT | Performed by: INTERNAL MEDICINE

## 2025-01-07 PROCEDURE — 99397 PER PM REEVAL EST PAT 65+ YR: CPT | Performed by: INTERNAL MEDICINE

## 2025-01-07 PROCEDURE — 1160F RVW MEDS BY RX/DR IN RCRD: CPT | Performed by: INTERNAL MEDICINE

## 2025-01-07 PROCEDURE — 3078F DIAST BP <80 MM HG: CPT | Performed by: INTERNAL MEDICINE

## 2025-01-07 PROCEDURE — 1123F ACP DISCUSS/DSCN MKR DOCD: CPT | Performed by: INTERNAL MEDICINE

## 2025-01-07 ASSESSMENT — ENCOUNTER SYMPTOMS
BLOOD IN STOOL: 0
WOUND: 0
SHORTNESS OF BREATH: 0
WEAKNESS: 0
VOICE CHANGE: 0
WHEEZING: 0
NERVOUS/ANXIOUS: 0
COLOR CHANGE: 0
STRIDOR: 0
ADENOPATHY: 0
HEADACHES: 0
MYALGIAS: 0
VOMITING: 0
TREMORS: 0
NECK STIFFNESS: 0
DIFFICULTY URINATING: 0
CONSTITUTIONAL NEGATIVE: 1
FACIAL ASYMMETRY: 0
JOINT SWELLING: 0
ENDOCRINE NEGATIVE: 1
SINUS PRESSURE: 0
GASTROINTESTINAL NEGATIVE: 1
DIZZINESS: 0
PSYCHIATRIC NEGATIVE: 1
TROUBLE SWALLOWING: 0
CONSTIPATION: 0
COUGH: 0
FLANK PAIN: 0
CARDIOVASCULAR NEGATIVE: 1
RESPIRATORY NEGATIVE: 1
LOSS OF SENSATION IN FEET: 0
SEIZURES: 0
AGITATION: 0
OCCASIONAL FEELINGS OF UNSTEADINESS: 0
ARTHRALGIAS: 0
SLEEP DISTURBANCE: 0
NUMBNESS: 0
DIARRHEA: 0
HALLUCINATIONS: 0
FREQUENCY: 0
SPEECH DIFFICULTY: 0
ACTIVITY CHANGE: 0
NECK PAIN: 0
POLYDIPSIA: 0
EYE PAIN: 0
POLYPHAGIA: 0
EYE DISCHARGE: 0
EYE REDNESS: 0
LIGHT-HEADEDNESS: 0
SINUS PAIN: 0
CHEST TIGHTNESS: 0
ABDOMINAL PAIN: 0
UNEXPECTED WEIGHT CHANGE: 0
BACK PAIN: 0
APPETITE CHANGE: 0
ALLERGIC/IMMUNOLOGIC NEGATIVE: 1
HEMATOLOGIC/LYMPHATIC NEGATIVE: 1
CONFUSION: 0
PALPITATIONS: 0
DYSURIA: 0
EYES NEGATIVE: 1
SORE THROAT: 0
DEPRESSION: 0
BRUISES/BLEEDS EASILY: 0
NAUSEA: 0

## 2025-01-07 ASSESSMENT — ACTIVITIES OF DAILY LIVING (ADL)
BATHING: INDEPENDENT
GROCERY_SHOPPING: INDEPENDENT
DRESSING: INDEPENDENT
MANAGING_FINANCES: INDEPENDENT
TAKING_MEDICATION: INDEPENDENT
DOING_HOUSEWORK: INDEPENDENT

## 2025-01-07 NOTE — PROGRESS NOTES
Subjective   Reason for Visit: Mara Montoya is an 74 y.o. female here for a Medicare Wellness visit.     Past Medical, Surgical, and Family History reviewed and updated in chart.    Reviewed all medications by prescribing practitioner or clinical pharmacist (such as prescriptions, OTCs, herbal therapies and supplements) and documented in the medical record.    HPI    Patient Care Team:  Liza Hebert MD PhD as PCP - General  Liza Hebert MD PhD as PCP - [employer] PCP     Patient comes for Medicare Wellness, Physical Exam and Follow up visit.     Patient has been feeling pretty good and has been complaint with prescribed medications.    We reviewed blood work including CBC, CMP, TSH, Lipid Panel, HbA1c, Vit D level done in .  Results within acceptable range.  Mildly low protein level and TSH noted.    Mammogram: 3/2024  DEXA: : osteopenia  Colon ca screenin, next   Pneumonia Vacc: 2020  Advance Directives: Healthcare Living Will and POA not on file. Patient advised to complete forms and bring/mail to the office.   POA discussed, confirmed and documented in patient's  EPIC record.     This is 72 yo female with complex medical problems including HTN, HLD, Hypothyroidism, osteopenia, back pain, lumbar spondylosis, right leg pain, varicose veins.     We reviewed and discussed her medical conditions during today's visit.    Patient suffers from night hot flashes despite taking HRT. She may consider tx with Vezoah, although reluctant to stop estrogen.  We will reduce Levothyroxine to 6 times per week, will monitor sx and obtain f/up thyroid function tests in 6-8 weeks.      She saw ENT regarding sinus congestion and hearing issues.  So far did not notice significant improvement, will f/up with ENT.    She has been tolerating treatment with Zepbound well, except occasional constipation which she has been managing with diet adjustments.  Intentionally lost about 40 lbs.       Patient has been following with [facility] pharmacist, I have been communicating with pharmacist regularly and supervising treatment with Zepbound.  We have been  slowly reducing dose of Zepbound to minimum maintenance dose which will control her appetite, allow to maintain weight without further weight loss.     She has been exercising regularly at [facility].     BP currently well controlled with Amlodipine 2.5 mg daily.      Due to  c/o right lower leg pain when walking  saw pain management Dr. Bradley.  Received diagnostic nerve block in March 2024.  No significant improvement noted.    She has been following with vein specialist Dr. Sahni, underwent ambulatory phlebectomy in 2024,  tolerated procedure well, has been wearing compression stockings since and seem to be feeling pretty good.  Awaiting to see longer term improvement.        Review of Systems   Constitutional: Negative.  Negative for activity change, appetite change and unexpected weight change.   HENT: Negative.  Negative for congestion, ear discharge, ear pain, hearing loss, mouth sores, nosebleeds, sinus pressure, sinus pain, sore throat, trouble swallowing and voice change.    Eyes: Negative.  Negative for pain, discharge, redness and visual disturbance.   Respiratory: Negative.  Negative for cough, chest tightness, shortness of breath, wheezing and stridor.    Cardiovascular: Negative.  Negative for chest pain, palpitations and leg swelling.   Gastrointestinal: Negative.  Negative for abdominal pain, blood in stool, constipation, diarrhea, nausea and vomiting.   Endocrine: Negative.  Negative for polydipsia, polyphagia and polyuria.   Genitourinary: Negative.  Negative for difficulty urinating, dysuria, flank pain, frequency and urgency.   Musculoskeletal:  Positive for gait problem. Negative for arthralgias, back pain, joint swelling, myalgias, neck pain and neck stiffness.   Skin: Negative.  Negative for color change, rash and wound.  "  Allergic/Immunologic: Negative.  Negative for environmental allergies, food allergies and immunocompromised state.   Neurological:  Negative for dizziness, tremors, seizures, syncope, facial asymmetry, speech difficulty, weakness, light-headedness, numbness and headaches.   Hematological: Negative.  Negative for adenopathy. Does not bruise/bleed easily.   Psychiatric/Behavioral: Negative.  Negative for agitation, behavioral problems, confusion, hallucinations, sleep disturbance and suicidal ideas. The patient is not nervous/anxious.    All other systems reviewed and are negative.      Objective   Vitals:  /70 (BP Location: Right arm, Patient Position: Sitting, BP Cuff Size: Adult)   Pulse 85   Temp 36.7 °C (98 °F) (Temporal)   Resp 16   Ht 1.6 m (5' 3\")   Wt 53.3 kg (117 lb 9.6 oz)   SpO2 99%   BMI 20.83 kg/m²       Physical Exam  Vitals and nursing note reviewed.   Constitutional:       General: She is not in acute distress.     Appearance: Normal appearance.   HENT:      Head: Normocephalic and atraumatic.      Right Ear: Tympanic membrane, ear canal and external ear normal.      Left Ear: Tympanic membrane, ear canal and external ear normal.      Nose: Nose normal. No congestion or rhinorrhea.      Mouth/Throat:      Mouth: Mucous membranes are moist.      Pharynx: Oropharynx is clear.   Eyes:      General:         Right eye: No discharge.         Left eye: No discharge.      Extraocular Movements: Extraocular movements intact.      Conjunctiva/sclera: Conjunctivae normal.      Pupils: Pupils are equal, round, and reactive to light.   Cardiovascular:      Rate and Rhythm: Normal rate and regular rhythm.      Pulses: Normal pulses.      Heart sounds: Normal heart sounds. No murmur heard.     No friction rub. No gallop.   Pulmonary:      Effort: Pulmonary effort is normal. No respiratory distress.      Breath sounds: Normal breath sounds. No stridor. No wheezing, rhonchi or rales.   Chest:      Chest " wall: No tenderness.   Abdominal:      General: Bowel sounds are normal.      Palpations: Abdomen is soft. There is no mass.      Tenderness: There is no abdominal tenderness. There is no guarding or rebound.   Musculoskeletal:         General: No swelling or deformity. Normal range of motion.      Cervical back: Normal range of motion and neck supple. No rigidity or tenderness.      Right lower leg: No edema.      Left lower leg: No edema.   Lymphadenopathy:      Cervical: No cervical adenopathy.   Skin:     General: Skin is warm and dry.      Coloration: Skin is not jaundiced.      Findings: No bruising or erythema.   Neurological:      General: No focal deficit present.      Mental Status: She is alert and oriented to person, place, and time. Mental status is at baseline.      Cranial Nerves: No cranial nerve deficit.      Motor: No weakness.      Coordination: Coordination normal.      Gait: Gait normal.   Psychiatric:         Mood and Affect: Mood normal.         Behavior: Behavior normal.         Thought Content: Thought content normal.         Judgment: Judgment normal.         Assessment & Plan  Routine general medical examination at health care facility    Orders:    1 Year Follow Up In [facility] - PCP - Wellness Exam; Future    Asymptomatic menopausal state    Orders:    XR DEXA bone density; Future    Encounter for screening mammogram for breast cancer    Orders:    BI mammo bilateral screening tomosynthesis; Future    Hypothyroidism, unspecified type  Clinically stable, reduce Levothyroxine to 6 times per week, continue t3 supplement daily.    Orders:    TSH; Future    Triiodothyronine, Total; Future    T4, free; Future    Erythrocytosis  Clinically stable. Will monitor, f/up with hematology.  Orders:    CBC and Auto Differential; Future    Primary hypertension  Controlled. Continue current treatment  Orders:    Comprehensive Metabolic Panel; Future    Overweight with body mass index (BMI) of  29 to 29.9 in adult  Clinically stable. F/up with [facility] pharmacist.  Orders:    tirzepatide, weight loss, (Zepbound) 10 mg/0.5 mL injection; Inject 10 mg under the skin every 7 days.    Skin cancer screening    Orders:    Referral to Dermatology    Pain in right lower leg    Orders:    Sedimentation Rate; Future    C-reactive protein; Future     It was a pleasure to see you today.  I would like to remind you about importance of a healthy lifestyle in order to improve your well-being and live longer.  Try to engage in physical activities for at least 150 minutes per week.  Eat about 10 servings of fruits and vegetables daily. My advice is 2 servings of fruits and 8 servings of vegetables.  For vegetables choose at least half of them green and at least half of them fresh.  Please avoid sugar, salt, fried food and saturated fat.    I spent a total of 35 minutes on the date of service for follow up visit, which included preparing to see the patient, face-to-face patient care, completing clinical documentation, obtaining and/or reviewing separately obtained history, performing a medically appropriate examination, counseling and educating the patient/family/caregiver, ordering medications, tests, or procedures, communicating with other health care providers (not separately reported), independently interpreting results (not separately reported), communicating results to the patient/family/caregiver, and care coordination (not separately reported).      F/up in 2 months or sooner if needed.

## 2025-01-07 NOTE — ASSESSMENT & PLAN NOTE
Clinically stable. Will monitor, f/up with hematology.  Orders:    CBC and Auto Differential; Future

## 2025-01-07 NOTE — ASSESSMENT & PLAN NOTE
Clinically stable. F/up with [facility] pharmacist.  Orders:    tirzepatide, weight loss, (Zepbound) 10 mg/0.5 mL injection; Inject 10 mg under the skin every 7 days.

## 2025-01-20 ENCOUNTER — APPOINTMENT (OUTPATIENT)
Dept: PHARMACY | Facility: HOSPITAL | Age: 75
End: 2025-01-20
Payer: MEDICARE

## 2025-01-20 DIAGNOSIS — E66.3 OVERWEIGHT WITH BODY MASS INDEX (BMI) OF 29 TO 29.9 IN ADULT: Primary | ICD-10-CM

## 2025-01-20 NOTE — ASSESSMENT & PLAN NOTE
Current regimen includes Zepbound 10 mg under the skin every 10 days (different from prescribed at every 7 days). Patient's current weight reported as 117.6 lbs at office visit on 01/07/25. Starting weight was 162 lbs on 10/19/23. Had lost 43 lbs (~29% of TBW) since starting therapy plan.     Discussed dosing with patient today, she reports continued hunger while on the 10 mg dose. Has 3 more 10 mg pens at home, will continue at 10 mg for the additional 3 doses and then we will discuss her dose again and potentially reducing down to 7.5 mg but dosing at every 7 days.     Medication Changes:  CONTINUE:  Zepbound 10 mg under the skin every 7-10 days     Future Considerations:  Discussed with patient that if she is maintaining her weight lost ideally we would want to reduce the dose down further. Informed patient that goal would be to reduce down to 7.5 mg once weekly at next follow-up.     Monitoring and Education:  Patient is aware medication is FDA approved to be given once weekly. Patient's increased food noise could be improved by reducing dosing frequency back to every 7 days. Patient will watch for trends over the next few weeks to see if she notices feeling more hungry when she goes 10 days in-between doses.   Medications are dosed appropriately per most recent renal and hepatic function    We will plan to follow-up in 4 weeks to see how she has continued to do on the 10 mg dose. She was encouraged to reach out with any questions and/or concerns prior to then.

## 2025-01-20 NOTE — PROGRESS NOTES
Patient is sent at the request of Liza Hebert M* for my opinion regarding weight management.  My final recommendations will be communicated back to the requesting provider by way of shared medical record.    Subjective     Mara Montoya is a 74 y.o. female who is overweight as evidenced by her most recent BMI of 24.33 kg/m2 as of 24 complicated by hyperlipidemia and hypertension. She was started on Wegovy 0.25 mg once weekly by Dr. Mckeon on 10/19/23. At this time her BMI was 29.63 kg/m2.     At last pharmacy visit patient had already reduced down to the 10 mg dose of Zepbound and was reporting taking it every 10 days. She was continued at the 10 mg dose and we discussed if she continues to maintain her weight lost reducing down further to 7.5 mg. We are following-up today to see how she has done with the 10 mg dose    She saw Dr. Mckeon on 25, no changes were made to her Zepbound dose at that time but it was noted that she is working on reducing her dose to the minimum maintenance dose to help control her appetite and maintain weight lost. She was given a prescription for a refill for the 10 mg dose at that time.     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    Past Surgical History:  She has a past surgical history that includes  section, classic (10/28/2013) and Knee surgery (10/28/2013).    Social History:  She reports that she has never smoked. She has never been exposed to tobacco smoke. She has never used smokeless tobacco.  She reports current alcohol use of about 7.0 standard drinks of alcohol per week. She reports that she does not use drugs.    Family History:  Family History   Problem Relation Name Age of Onset    Hypertension Mother      Stroke Mother      Hypertension Father      Crohn's disease Brother      Macular degeneration Mother's Sister       Allergies:  Gabapentin    Current diet:   Breakfast: egg with toast, coffee  Lunch: tomato with a slice of bread   Dinner: salmon, sweet potatoes, green vegetables   Snacks: not really snacking, is does may have popcorn   Fluids: water, tea at night, has been working on increasing fluid intake   Will also have oranges occasionally   Has been eating more hardboiled eggs recently   Does note some increased hunger since reducing the dose down but is taking every 10 days instead of every 7 days.     Current exercise:   Has been working on increasing activity   Yoga once a week  Pilates once a week   Is working on increasing activity to 3 days per week     Previous Attempts at Weight Loss:   Was in a program with [facility] with  and son to eat a plant based diet, states this has led to some of their current dietary habits  Has tried intermittent fasting   Has also tried the Mediterranean diet    Rentho scale - states that this works with an azucena on her phone to calculate BMI and give dietary recommendations     The patient does not have a known family history of diabetes.    Adverse Effects:   None reported today    Objective     Weight Management Pharmacotherapy:  Zepbound 10 mg under the skin once weekly (is currently taking every 10 days)   Has 3 doses left at home     Historical Weight Management Pharmacotherapy:   Wegovy 1 mg (switched to Zepbound due to it being cheaper)    Pertinent PMH Review:  PMH of Pancreatitis: No  PMH of Retinopathy: No  PMH of MTC: No  PMH of MEN 2: No    Lab Review  Lab Results   Component Value Date    BILITOT 0.9 12/31/2024    CALCIUM 8.8 12/31/2024     "CO2 27 12/31/2024     12/31/2024    CREATININE 0.63 12/31/2024    GLUCOSE 80 12/31/2024    ALKPHOS 41 12/31/2024    K 4.5 12/31/2024    PROT 6.0 (L) 12/31/2024     12/31/2024    AST 14 12/31/2024    ALT 11 12/31/2024    BUN 11 12/31/2024    ANIONGAP 12 12/31/2024    MG 1.70 05/18/2021    PHOS 2.6 05/18/2021    ALBUMIN 3.8 12/31/2024    GFRF 89 07/14/2023     Lab Results   Component Value Date    TRIG 82 12/31/2024    CHOL 201 (H) 12/31/2024    LDLCALC 112 (H) 12/31/2024    HDL 72.9 12/31/2024     No results found for: \"HGBA1C\"  No components found for: \"UACR\"  The 10-year ASCVD risk score (Josi TONY, et al., 2019) is: 17.9%    Values used to calculate the score:      Age: 74 years      Sex: Female      Is Non- : No      Diabetic: No      Tobacco smoker: No      Systolic Blood Pressure: 132 mmHg      Is BP treated: Yes      HDL Cholesterol: 72.9 mg/dL      Total Cholesterol: 201 mg/dL    Drug Interactions:  No significant drug-drug interactions exist that require an adjustment to medication therapy.    Wt Readings from Last 6 Encounters:   01/07/25 53.3 kg (117 lb 9.6 oz)   10/22/24 52.8 kg (116 lb 6.4 oz)   10/17/24 52.6 kg (116 lb)   08/14/24 53.3 kg (117 lb 6.4 oz)   07/18/24 54.9 kg (121 lb)   05/13/24 60.3 kg (133 lb)     Estimated body mass index is 20.83 kg/m² as calculated from the following:    Height as of 1/7/25: 1.6 m (5' 3\").    Weight as of 1/7/25: 53.3 kg (117 lb 9.6 oz).    Patient Reported Home Weights:   02/21/24: 146 lbs   03/18/24: 143 lbs   04/22/24: 134 lbs   06/17/24: 128 lbs   07/15/24: 120.2 lbs   08/12/24: 119 lbs   09/09/24: 115 lbs   11/04/24: 117.8 lbs  12/09/24: 115 lbs (calculated BMI 20.4 kg/m2)       Assessment/Plan   Problem List Items Addressed This Visit       Overweight with body mass index (BMI) of 29 to 29.9 in adult - Primary     Current regimen includes Zepbound 10 mg under the skin every 10 days (different from prescribed at every 7 days). " Patient's current weight reported as 117.6 lbs at office visit on 01/07/25. Starting weight was 162 lbs on 10/19/23. Had lost 43 lbs (~29% of TBW) since starting therapy plan.     Discussed dosing with patient today, she reports continued hunger while on the 10 mg dose. Has 3 more 10 mg pens at home, will continue at 10 mg for the additional 3 doses and then we will discuss her dose again and potentially reducing down to 7.5 mg but dosing at every 7 days.     Medication Changes:  CONTINUE:  Zepbound 10 mg under the skin every 7-10 days     Future Considerations:  Discussed with patient that if she is maintaining her weight lost ideally we would want to reduce the dose down further. Informed patient that goal would be to reduce down to 7.5 mg once weekly at next follow-up.     Monitoring and Education:  Patient is aware medication is FDA approved to be given once weekly. Patient's increased food noise could be improved by reducing dosing frequency back to every 7 days. Patient will watch for trends over the next few weeks to see if she notices feeling more hungry when she goes 10 days in-between doses.   Medications are dosed appropriately per most recent renal and hepatic function    We will plan to follow-up in 4 weeks to see how she has continued to do on the 10 mg dose. She was encouraged to reach out with any questions and/or concerns prior to then.          Relevant Orders    Referral to Clinical Pharmacy     Pharmacy Follow-Up: 02/17/2025    PCP Follow-Up: 03/07/2025    Iglesia Watson PharmD     Continue all meds under the continuation of care with the referring provider and clinical pharmacy team.

## 2025-01-29 ENCOUNTER — APPOINTMENT (OUTPATIENT)
Dept: RADIOLOGY | Facility: HOSPITAL | Age: 75
End: 2025-01-29
Payer: MEDICARE

## 2025-01-31 ENCOUNTER — HOSPITAL ENCOUNTER (OUTPATIENT)
Dept: RADIOLOGY | Facility: HOSPITAL | Age: 75
Discharge: HOME | End: 2025-01-31
Payer: MEDICARE

## 2025-01-31 DIAGNOSIS — Z78.0 ASYMPTOMATIC MENOPAUSAL STATE: ICD-10-CM

## 2025-01-31 PROCEDURE — 77080 DXA BONE DENSITY AXIAL: CPT

## 2025-02-01 NOTE — RESULT ENCOUNTER NOTE
Your bone density scan showed osteopenia. Please maintain enough calcium in your diet:  [id number] mg daily (consume foods rich in calcium rather than taking only calcium supplement to meet daily calcium requirements), take daily Vitamin D supplement with meal. Average Vit D dose is 2000 international units daily.  Weight bearing exercise routine is recommended. We will discuss details during your next office visit.  Dr. Mckeon

## 2025-02-17 ENCOUNTER — APPOINTMENT (OUTPATIENT)
Dept: PHARMACY | Facility: HOSPITAL | Age: 75
End: 2025-02-17
Payer: MEDICARE

## 2025-03-03 ENCOUNTER — APPOINTMENT (OUTPATIENT)
Dept: PHARMACY | Facility: HOSPITAL | Age: 75
End: 2025-03-03
Payer: MEDICARE

## 2025-03-03 DIAGNOSIS — E66.3 OVERWEIGHT WITH BODY MASS INDEX (BMI) OF 29 TO 29.9 IN ADULT: Primary | ICD-10-CM

## 2025-03-03 NOTE — PROGRESS NOTES
Patient is sent at the request of Liza Hebert M* for my opinion regarding weight management.  My final recommendations will be communicated back to the requesting provider by way of shared medical record.    Subjective     Mara Montoya is a 74 y.o. female who is overweight as evidenced by her most recent BMI of 24.33 kg/m2 as of 24 complicated by hyperlipidemia and hypertension. She was started on Wegovy 0.25 mg once weekly by Dr. Mckeon on 10/19/23. At this time her BMI was 29.63 kg/m2.     She saw Dr. Mckeon on 25, no changes were made to her Zepbound dose at that time but it was noted that she is working on reducing her dose to the minimum maintenance dose to help control her appetite and maintain weight lost. She was given a prescription for a refill for the 10 mg dose at that time.     At last pharmacy encounter patient reported giving 10 mg of Zepbound every 7-10 days. She had 2 more pens at home at that time. We are following-up today to discuss reducing her dose down to 7.5 mg once weekly.     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    Past Surgical History:  She has a past surgical history that includes  section, classic (10/28/2013) and Knee surgery (10/28/2013).    Social History:  She reports that she has never smoked. She has never been exposed to tobacco smoke. She has never used smokeless tobacco. She reports current alcohol use of about 7.0 standard drinks of alcohol per week. She reports that she does not use  drugs.    Family History:  Family History   Problem Relation Name Age of Onset    Hypertension Mother      Stroke Mother      Hypertension Father      Crohn's disease Brother      Macular degeneration Mother's Sister       Allergies:  Gabapentin    Current diet:   Breakfast: egg with toast, coffee  Lunch: tomato with a slice of bread   Dinner: salmon, sweet potatoes, green vegetables   Snacks: not really snacking, is does may have popcorn   Fluids: water, tea at night, has been working on increasing fluid intake   Will also have oranges occasionally   Has been eating more hardboiled eggs recently   Does note some increased hunger since reducing the dose down but is taking every 10 days instead of every 7 days.     Current exercise:   Has been working on increasing activity   Yoga once a week  Pilates once a week   Is trying to add a second pilates class on Saturdays     Previous Attempts at Weight Loss:   Was in a program with [facility] with  and son to eat a plant based diet, states this has led to some of their current dietary habits  Has tried intermittent fasting   Has also tried the Mediterranean diet    Rentho scale - states that this works with an azucena on her phone to calculate BMI and give dietary recommendations     The patient does not have a known family history of diabetes.    Adverse Effects:   None reported today  Does note that the increased satiety wears off around day 7-8 and notes increased hunger the last few days before her dose     Objective     Weight Management Pharmacotherapy:  Zepbound 10 mg under the skin once weekly (is currently taking every 10 days)     Historical Weight Management Pharmacotherapy:   Wegovy 1 mg (switched to Zepbound due to it being cheaper)    Pertinent PMH Review:  PMH of Pancreatitis: No  PMH of Retinopathy: No  PMH of MTC: No  PMH of MEN 2: No    Lab Review  Lab Results   Component Value Date    BILITOT 0.9 12/31/2024    CALCIUM 8.8 12/31/2024    CO2 27 12/31/2024     " 12/31/2024    CREATININE 0.63 12/31/2024    GLUCOSE 80 12/31/2024    ALKPHOS 41 12/31/2024    K 4.5 12/31/2024    PROT 6.0 (L) 12/31/2024     12/31/2024    AST 14 12/31/2024    ALT 11 12/31/2024    BUN 11 12/31/2024    ANIONGAP 12 12/31/2024    MG 1.70 05/18/2021    PHOS 2.6 05/18/2021    ALBUMIN 3.8 12/31/2024    GFRF 89 07/14/2023     Lab Results   Component Value Date    TRIG 82 12/31/2024    CHOL 201 (H) 12/31/2024    LDLCALC 112 (H) 12/31/2024    HDL 72.9 12/31/2024     No results found for: \"HGBA1C\"  No components found for: \"UACR\"  The 10-year ASCVD risk score (Josi TONY, et al., 2019) is: 17.9%    Values used to calculate the score:      Age: 74 years      Sex: Female      Is Non- : No      Diabetic: No      Tobacco smoker: No      Systolic Blood Pressure: 132 mmHg      Is BP treated: Yes      HDL Cholesterol: 72.9 mg/dL      Total Cholesterol: 201 mg/dL    Drug Interactions:  No significant drug-drug interactions exist that require an adjustment to medication therapy.    Wt Readings from Last 6 Encounters:   01/07/25 53.3 kg (117 lb 9.6 oz)   10/22/24 52.8 kg (116 lb 6.4 oz)   10/17/24 52.6 kg (116 lb)   08/14/24 53.3 kg (117 lb 6.4 oz)   07/18/24 54.9 kg (121 lb)   05/13/24 60.3 kg (133 lb)     Estimated body mass index is 20.83 kg/m² as calculated from the following:    Height as of 1/7/25: 1.6 m (5' 3\").    Weight as of 1/7/25: 53.3 kg (117 lb 9.6 oz).    Patient Reported Home Weights:   02/21/24: 146 lbs   03/18/24: 143 lbs   04/22/24: 134 lbs   06/17/24: 128 lbs   07/15/24: 120.2 lbs   08/12/24: 119 lbs   09/09/24: 115 lbs   11/04/24: 117.8 lbs  12/09/24: 115 lbs (calculated BMI 20.4 kg/m2)   03/03/25: 117 lbs       Assessment/Plan   Problem List Items Addressed This Visit       Overweight with body mass index (BMI) of 29 to 29.9 in adult - Primary     Current regimen includes Zepbound 10 mg under the skin every 10 days (different from prescribed at every 7 days). " Patient's current weight reported as 117 lbs. Starting weight was 162 lbs on 10/19/23. Had lost 43 lbs (~29% of TBW) since starting therapy plan at lowest weight of 115 lbs in December 2024.     Discussed dosing with patient today, she reports continued hunger while on the 10 mg dose the last few days prior to her next dose. Informed patient that the medication does wear off ~6-7 days and it makes sense that the increased satiety she was feeling would go away around day 7-8. I recommended reducing down to 7.5 mg once weekly and changing dosing to every 7 days as I think she would do better at a lower dose taking the medication as intended due to the pharmacology. At this time she is not agreeable to reducing the dose down further and instead would like to continue at 10 mg once weekly and move back to every 7 days to ensure she is still maintaining weight on it.     Medication Changes:  CHANGE:   Zepbound 10 mg under the skin every 7 days (changed from every 10 days)    Future Considerations:  Discussed with patient that if she is able to continue to maintain weight lost should reduce down further to the 7.5 mg once weekly dose at next follow-up    Monitoring and Education:  We will assess for any new/worsening side effects with switching dosing back to every 7 days from every 10 days. Patient has done very well with therapy so far so I do not for-see any issues.   Will obtain updated home weight at next follow-up to ensure she is maintaining weight and not losing more weight   Medications are dosed appropriately per most recent renal and hepatic function  Prescription sent over to [facility] for patient to switch to vials since they now produce 10 mg vials at a significantly cheaper cost. 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     We will plan to follow-up in 4 weeks to see how she has continued to do on the 10 mg dose every 7 days. She was  encouraged to reach out with any questions and/or concerns prior to then.          Relevant Medications    tirzepatide, weight loss, (Zepbound) 10 mg/0.5 mL injection    Other Relevant Orders    Referral to Clinical Pharmacy     Pharmacy Follow-Up: 03/28/2025   PCP Follow-Up: 03/07/2025    Iglesia Watson PharmD     Continue all meds under the continuation of care with the referring provider and clinical pharmacy team.

## 2025-03-04 NOTE — ASSESSMENT & PLAN NOTE
Current regimen includes Zepbound 10 mg under the skin every 10 days (different from prescribed at every 7 days). Patient's current weight reported as 117 lbs. Starting weight was 162 lbs on 10/19/23. Had lost 43 lbs (~29% of TBW) since starting therapy plan at lowest weight of 115 lbs in December 2024.     Discussed dosing with patient today, she reports continued hunger while on the 10 mg dose the last few days prior to her next dose. Informed patient that the medication does wear off ~6-7 days and it makes sense that the increased satiety she was feeling would go away around day 7-8. I recommended reducing down to 7.5 mg once weekly and changing dosing to every 7 days as I think she would do better at a lower dose taking the medication as intended due to the pharmacology. At this time she is not agreeable to reducing the dose down further and instead would like to continue at 10 mg once weekly and move back to every 7 days to ensure she is still maintaining weight on it.     Medication Changes:  CHANGE:   Zepbound 10 mg under the skin every 7 days (changed from every 10 days)    Future Considerations:  Discussed with patient that if she is able to continue to maintain weight lost should reduce down further to the 7.5 mg once weekly dose at next follow-up    Monitoring and Education:  We will assess for any new/worsening side effects with switching dosing back to every 7 days from every 10 days. Patient has done very well with therapy so far so I do not for-see any issues.   Will obtain updated home weight at next follow-up to ensure she is maintaining weight and not losing more weight   Medications are dosed appropriately per most recent renal and hepatic function  Prescription sent over to [facility] for patient to switch to vials since they now produce 10 mg vials at a significantly cheaper cost. 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     We  will plan to follow-up in 4 weeks to see how she has continued to do on the 10 mg dose every 7 days. She was encouraged to reach out with any questions and/or concerns prior to then.

## 2025-03-05 LAB
ALBUMIN SERPL-MCNC: 4.2 G/DL (ref 3.6–5.1)
ALP SERPL-CCNC: 40 U/L (ref 37–153)
ALT SERPL-CCNC: 12 U/L (ref 6–29)
ANION GAP SERPL CALCULATED.4IONS-SCNC: 6 MMOL/L (CALC) (ref 7–17)
AST SERPL-CCNC: 16 U/L (ref 10–35)
BASOPHILS # BLD AUTO: 29 CELLS/UL (ref 0–200)
BASOPHILS NFR BLD AUTO: 0.6 %
BILIRUB SERPL-MCNC: 0.8 MG/DL (ref 0.2–1.2)
BUN SERPL-MCNC: 15 MG/DL (ref 7–25)
CALCIUM SERPL-MCNC: 9.4 MG/DL (ref 8.6–10.4)
CHLORIDE SERPL-SCNC: 103 MMOL/L (ref 98–110)
CO2 SERPL-SCNC: 28 MMOL/L (ref 20–32)
CREAT SERPL-MCNC: 0.57 MG/DL (ref 0.6–1)
CRP SERPL-MCNC: <3 MG/L
EGFRCR SERPLBLD CKD-EPI 2021: 96 ML/MIN/1.73M2
EOSINOPHIL # BLD AUTO: 101 CELLS/UL (ref 15–500)
EOSINOPHIL NFR BLD AUTO: 2.1 %
ERYTHROCYTE [DISTWIDTH] IN BLOOD BY AUTOMATED COUNT: 13.5 % (ref 11–15)
ERYTHROCYTE [SEDIMENTATION RATE] IN BLOOD BY WESTERGREN METHOD: 2 MM/H
GLUCOSE SERPL-MCNC: 80 MG/DL (ref 65–99)
HCT VFR BLD AUTO: 48.5 % (ref 35–45)
HGB BLD-MCNC: 16 G/DL (ref 11.7–15.5)
LYMPHOCYTES # BLD AUTO: 979 CELLS/UL (ref 850–3900)
LYMPHOCYTES NFR BLD AUTO: 20.4 %
MCH RBC QN AUTO: 32.3 PG (ref 27–33)
MCHC RBC AUTO-ENTMCNC: 33 G/DL (ref 32–36)
MCV RBC AUTO: 97.8 FL (ref 80–100)
MONOCYTES # BLD AUTO: 499 CELLS/UL (ref 200–950)
MONOCYTES NFR BLD AUTO: 10.4 %
NEUTROPHILS # BLD AUTO: 3192 CELLS/UL (ref 1500–7800)
NEUTROPHILS NFR BLD AUTO: 66.5 %
PLATELET # BLD AUTO: 224 THOUSAND/UL (ref 140–400)
PMV BLD REES-ECKER: 10 FL (ref 7.5–12.5)
POTASSIUM SERPL-SCNC: 4.2 MMOL/L (ref 3.5–5.3)
PROT SERPL-MCNC: 6.2 G/DL (ref 6.1–8.1)
RBC # BLD AUTO: 4.96 MILLION/UL (ref 3.8–5.1)
SODIUM SERPL-SCNC: 137 MMOL/L (ref 135–146)
T3 SERPL-MCNC: 91 NG/DL (ref 76–181)
T4 FREE SERPL-MCNC: 1.4 NG/DL (ref 0.8–1.8)
TSH SERPL-ACNC: 0.42 MIU/L (ref 0.4–4.5)
WBC # BLD AUTO: 4.8 THOUSAND/UL (ref 3.8–10.8)

## 2025-03-07 ENCOUNTER — APPOINTMENT (OUTPATIENT)
Dept: PRIMARY CARE | Facility: CLINIC | Age: 75
End: 2025-03-07
Payer: MEDICARE

## 2025-03-07 VITALS
HEIGHT: 63 IN | TEMPERATURE: 98 F | HEART RATE: 80 BPM | OXYGEN SATURATION: 98 % | WEIGHT: 118.8 LBS | SYSTOLIC BLOOD PRESSURE: 130 MMHG | RESPIRATION RATE: 16 BRPM | DIASTOLIC BLOOD PRESSURE: 80 MMHG | BODY MASS INDEX: 21.05 KG/M2

## 2025-03-07 DIAGNOSIS — I10 PRIMARY HYPERTENSION: ICD-10-CM

## 2025-03-07 DIAGNOSIS — M79.604 LEG PAIN, RIGHT: Primary | ICD-10-CM

## 2025-03-07 DIAGNOSIS — E46 PROTEIN-CALORIE MALNUTRITION, UNSPECIFIED SEVERITY (MULTI): ICD-10-CM

## 2025-03-07 DIAGNOSIS — M85.89 OSTEOPENIA OF MULTIPLE SITES: ICD-10-CM

## 2025-03-07 DIAGNOSIS — E03.9 HYPOTHYROIDISM, UNSPECIFIED TYPE: ICD-10-CM

## 2025-03-07 DIAGNOSIS — I83.811 VARICOSE VEINS OF LOWER EXTREMITY WITH PAIN, RIGHT: ICD-10-CM

## 2025-03-07 DIAGNOSIS — Z00.00 ROUTINE GENERAL MEDICAL EXAMINATION AT HEALTH CARE FACILITY: ICD-10-CM

## 2025-03-07 ASSESSMENT — ENCOUNTER SYMPTOMS
GASTROINTESTINAL NEGATIVE: 1
CONSTITUTIONAL NEGATIVE: 1
SINUS PAIN: 0
SORE THROAT: 0
CHEST TIGHTNESS: 0
CONFUSION: 0
NAUSEA: 0
TROUBLE SWALLOWING: 0
DYSURIA: 0
NUMBNESS: 0
CARDIOVASCULAR NEGATIVE: 1
COUGH: 0
DIZZINESS: 0
SHORTNESS OF BREATH: 0
SLEEP DISTURBANCE: 0
POLYDIPSIA: 0
HEMATOLOGIC/LYMPHATIC NEGATIVE: 1
FACIAL ASYMMETRY: 0
FREQUENCY: 0
EYE REDNESS: 0
CONSTIPATION: 0
HEADACHES: 0
WOUND: 0
ACTIVITY CHANGE: 0
BACK PAIN: 0
VOICE CHANGE: 0
PSYCHIATRIC NEGATIVE: 1
APPETITE CHANGE: 0
ARTHRALGIAS: 1
EYES NEGATIVE: 1
DIFFICULTY URINATING: 0
RESPIRATORY NEGATIVE: 1
VOMITING: 0
DIARRHEA: 0
EYE PAIN: 0
HALLUCINATIONS: 0
JOINT SWELLING: 0
EYE DISCHARGE: 0
WEAKNESS: 0
FLANK PAIN: 0
SEIZURES: 0
NECK PAIN: 0
ALLERGIC/IMMUNOLOGIC NEGATIVE: 1
TREMORS: 0
STRIDOR: 0
LIGHT-HEADEDNESS: 0
AGITATION: 0
POLYPHAGIA: 0
ENDOCRINE NEGATIVE: 1
ADENOPATHY: 0
SINUS PRESSURE: 0
MYALGIAS: 0
WHEEZING: 0
NECK STIFFNESS: 0
ABDOMINAL PAIN: 0
SPEECH DIFFICULTY: 0
COLOR CHANGE: 0
PALPITATIONS: 0
UNEXPECTED WEIGHT CHANGE: 0
BLOOD IN STOOL: 0
BRUISES/BLEEDS EASILY: 0
NERVOUS/ANXIOUS: 0

## 2025-03-07 ASSESSMENT — PATIENT HEALTH QUESTIONNAIRE - PHQ9
2. FEELING DOWN, DEPRESSED OR HOPELESS: NOT AT ALL
1. LITTLE INTEREST OR PLEASURE IN DOING THINGS: NOT AT ALL
SUM OF ALL RESPONSES TO PHQ9 QUESTIONS 1 AND 2: 0

## 2025-03-07 NOTE — PROGRESS NOTES
Subjective   Patient ID: Mara Montoya is a 74 y.o. female who presents for Follow-up (Pt is here due to a 2 month follow up).    HPI   This is 74 yo female with complex medical problems including HTN, HLD, Hypothyroidism, osteopenia, back pain, lumbar spondylosis, right leg pain, varicose veins.     We reviewed and discussed her medical conditions during today's visit.      Patient has been feeling pretty good and has been complaint with prescribed medications.    We reviewed and discussed details of recent blood work: CBC, CMP, TSH, T3 and T4 done in March 2025.  Results within acceptable range.    Recent DEXA scan in January 2025 showed osteopenia, mildly worse comparing to previous test.  We discussed importance of getting calcium through diet, written info provided.    We decreased Levothyroxine to 6 times per week, recent thyroid levels within normal range.  Patient suffers from night hot flashes despite taking HRT. She may consider tx with Vezoah, although reluctant to stop estrogen.  We reduced Levothyroxine to 6 times per week, it did not make significant difference in hot flashes occurrence.      She saw ENT regarding sinus congestion and hearing issues.  So far did not notice significant improvement, will f/up with ENT.     Patient has been tolerating treatment with Zepbound well, except occasional constipation which she has been managing with diet adjustments.  She received first shipment of Zepbound 10 mg in vials (with syringes)  Intentionally lost about 40 lbs.      Patient has been following with [facility] pharmacist, I have been communicating with pharmacist regularly and supervising treatment with Zepbound.  We have been  slowly reducing dose of Zepbound to minimum maintenance dose which will control her appetite, allow to maintain weight without further weight loss.      She has been exercising regularly at [facility].     BP currently well controlled with Amlodipine 2.5 mg daily.      Due  to  c/o right lower leg pain when walking  saw pain management Dr. Bradley.  Received diagnostic nerve block in March 2024.  No significant improvement noted.    She would like to try acupuncture again.    Planing to see ortho Dr. Atkins for evaluation.     She has been following with vein specialist Dr. Sahni, underwent ambulatory phlebectomy in 2024,  tolerated procedure well, has been wearing compression stockings since and seem to be feeling pretty good.      Review of Systems   Constitutional: Negative.  Negative for activity change, appetite change and unexpected weight change.   HENT: Negative.  Negative for congestion, ear discharge, ear pain, hearing loss, mouth sores, nosebleeds, sinus pressure, sinus pain, sore throat, trouble swallowing and voice change.    Eyes: Negative.  Negative for pain, discharge, redness and visual disturbance.   Respiratory: Negative.  Negative for cough, chest tightness, shortness of breath, wheezing and stridor.    Cardiovascular: Negative.  Negative for chest pain, palpitations and leg swelling.   Gastrointestinal: Negative.  Negative for abdominal pain, blood in stool, constipation, diarrhea, nausea and vomiting.   Endocrine: Negative.  Negative for polydipsia, polyphagia and polyuria.   Genitourinary: Negative.  Negative for difficulty urinating, dysuria, flank pain, frequency and urgency.   Musculoskeletal:  Positive for arthralgias and gait problem. Negative for back pain, joint swelling, myalgias, neck pain and neck stiffness.   Skin: Negative.  Negative for color change, rash and wound.   Allergic/Immunologic: Negative.  Negative for environmental allergies, food allergies and immunocompromised state.   Neurological:  Negative for dizziness, tremors, seizures, syncope, facial asymmetry, speech difficulty, weakness, light-headedness, numbness and headaches.   Hematological: Negative.  Negative for adenopathy. Does not bruise/bleed easily.   Psychiatric/Behavioral:  "Negative.  Negative for agitation, behavioral problems, confusion, hallucinations, sleep disturbance and suicidal ideas. The patient is not nervous/anxious.    All other systems reviewed and are negative.          Objective   /80 (BP Location: Left arm, Patient Position: Sitting, BP Cuff Size: Adult)   Pulse 80   Temp 36.7 °C (98 °F) (Temporal)   Resp 16   Ht 1.6 m (5' 3\")   Wt 53.9 kg (118 lb 12.8 oz)   SpO2 98%   BMI 21.04 kg/m²     Physical Exam  Vitals and nursing note reviewed.   Constitutional:       General: She is not in acute distress.     Appearance: Normal appearance.   HENT:      Head: Normocephalic and atraumatic.      Right Ear: External ear normal.      Left Ear: External ear normal.      Nose: Nose normal. No congestion or rhinorrhea.   Eyes:      General:         Right eye: No discharge.         Left eye: No discharge.      Extraocular Movements: Extraocular movements intact.      Conjunctiva/sclera: Conjunctivae normal.      Pupils: Pupils are equal, round, and reactive to light.   Cardiovascular:      Rate and Rhythm: Normal rate and regular rhythm.      Pulses: Normal pulses.      Heart sounds: Normal heart sounds. No murmur heard.     No friction rub. No gallop.   Pulmonary:      Effort: Pulmonary effort is normal. No respiratory distress.      Breath sounds: Normal breath sounds. No stridor. No wheezing, rhonchi or rales.   Chest:      Chest wall: No tenderness.   Abdominal:      General: Bowel sounds are normal.      Palpations: Abdomen is soft. There is no mass.      Tenderness: There is no abdominal tenderness. There is no guarding or rebound.   Musculoskeletal:         General: No swelling or deformity. Normal range of motion.      Cervical back: Normal range of motion and neck supple. No rigidity or tenderness.      Right lower leg: No edema.      Left lower leg: No edema.   Lymphadenopathy:      Cervical: No cervical adenopathy.   Skin:     General: Skin is warm and dry.      " Coloration: Skin is not jaundiced.      Findings: No bruising or erythema.   Neurological:      General: No focal deficit present.      Mental Status: She is alert and oriented to person, place, and time. Mental status is at baseline.      Cranial Nerves: No cranial nerve deficit.      Motor: No weakness.      Coordination: Coordination normal.      Gait: Gait normal.   Psychiatric:         Mood and Affect: Mood normal.         Behavior: Behavior normal.         Thought Content: Thought content normal.         Judgment: Judgment normal.             Assessment/Plan   Problem List Items Addressed This Visit             ICD-10-CM       Cardiac and Vasculature    Varicose veins of lower extremity with pain, right I83.811     F/up with  vascular surgery         Primary hypertension I10     Controlled. Continue current treatment                Endocrine/Metabolic    Hypothyroidism E03.9     Clinically stable, continue  Levothyroxine 6 times per week, continue T3 supplement daily.               Protein-calorie malnutrition, unspecified severity (Multi) E46     Recent protein level normalized. Clinically stable. Will monitor.             Musculoskeletal and Injuries    Osteopenia M85.80     Please maintain enough calcium in your diet:  3026-9052 mg daily (consume foods rich in calcium rather than taking only calcium supplement to meet daily calcium requirements), take daily Vitamin D supplement with meal. Average Vit D dose is 2000 international units daily.  Weight bearing exercise routine is recommended.          Leg pain, right - Primary M79.604    Relevant Orders    Referral to [facility]     Other Visit Diagnoses         Codes    Routine general medical examination at health care facility     Z00.00        It was a pleasure to see you today.  I would like to remind you about importance of a healthy lifestyle in order to improve your well-being and live longer.  Try to engage in physical activities for at least  150 minutes per week.  Eat about 10 servings of fruits and vegetables daily. My advice is 2 servings of fruits and 8 servings of vegetables.  For vegetables choose at least half of them green and at least half of them fresh.  Please avoid sugar, salt, fried food and saturated fat.    I spent a total of 35 minutes on the date of service for follow up visit, which included preparing to see the patient, face-to-face patient care, completing clinical documentation, obtaining and/or reviewing separately obtained history, performing a medically appropriate examination, counseling and educating the patient/family/caregiver, ordering medications, tests, or procedures, communicating with other health care providers (not separately reported), independently interpreting results (not separately reported), communicating results to the patient/family/caregiver, and care coordination (not separately reported).    F/up in 6 months or sooner if needed.

## 2025-03-08 NOTE — ASSESSMENT & PLAN NOTE
Congratulations on your weight loss. Please continue Zepbound and healthy lifestyle to maintain your weight. Further weight loss is not advised.

## 2025-03-08 NOTE — ASSESSMENT & PLAN NOTE
Please maintain enough calcium in your diet:  5033-6908 mg daily (consume foods rich in calcium rather than taking only calcium supplement to meet daily calcium requirements), take daily Vitamin D supplement with meal. Average Vit D dose is 2000 international units daily.  Weight bearing exercise routine is recommended.

## 2025-03-25 ENCOUNTER — APPOINTMENT (OUTPATIENT)
Dept: PHARMACY | Facility: HOSPITAL | Age: 75
End: 2025-03-25
Payer: MEDICARE

## 2025-03-25 DIAGNOSIS — E66.3 OVERWEIGHT WITH BODY MASS INDEX (BMI) OF 29 TO 29.9 IN ADULT: Primary | ICD-10-CM

## 2025-03-25 NOTE — ASSESSMENT & PLAN NOTE
Current regimen includes Zepbound 10 mg under the skin once weekly. Patient's current weight reported as 116 lbs. Starting weight was 162 lbs on 10/19/23. Had lost 43 lbs (~29% of TBW) since starting therapy plan at lowest weight of 115 lbs in December 2024. Weight has remained stable since last encounter. Patient does report less hunger and cravings giving the medication every 7 days instead of every 10 days but still notes some increased hunger towards the end of the week. Discussed reducing down to 7.5 mg once weekly to see if she is able to maintain weight, however, patient would like to stay at the 10 mg dose for another month to ensure she is doing well on it.     Medication Changes:  CONTINUE  Zepbound 10 mg under the skin every 7 days    Future Considerations:  Discussed with patient that if she is able to continue to maintain weight lost should reduce down further to the 7.5 mg once weekly, she should ideally stay at the lowest dose needed to maintain weight loss    Monitoring and Education:  We will assess for any new/worsening side effects at next encounter. Patient has done very well with therapy so far so I do not for-see any issues.   Will obtain updated home weight at next follow-up to ensure she is maintaining weight and not losing more weight   Medications are dosed appropriately per most recent renal and hepatic function    We will plan to follow-up in 4 weeks to see how she has continued to do on her current dose of Zepbound. She was encouraged to reach out with any questions and/or concerns prior to then.

## 2025-03-25 NOTE — PROGRESS NOTES
Patient is sent at the request of Liza Hebert M* for my opinion regarding weight management.  My final recommendations will be communicated back to the requesting provider by way of shared medical record.    Subjective     Mara Montoya is a 74 y.o. female who is overweight as evidenced by her most recent BMI of 24.33 kg/m2 as of 24 complicated by hyperlipidemia and hypertension. She was started on Wegovy 0.25 mg once weekly by Dr. Mckeon on 10/19/23. At this time her BMI was 29.63 kg/m2.     She saw Dr. Mckeon on 25, no changes were made to her Zepbound dose at that time but it was noted that she is working on reducing her dose to the minimum maintenance dose to help control her appetite and maintain weight lost.   At last pharmacy encounter patient reported giving 10 mg of Zepbound every 10 days but was noting continued hunger. Since Zepbound vials are now available in the 10 mg dose, we switched her from pens to the vials. Explained to patient that they do require a refill every 45 days so she must use every 7 days as indicated. We are following-up today to see how she has done with the 10 mg dose giving it every 7 days.     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    Past Surgical History:  She has a past surgical history that includes  section, classic (10/28/2013) and Knee surgery (10/28/2013).    Social History:  She reports that she has never smoked. She has never been exposed to tobacco smoke. She has never used smokeless  tobacco. She reports current alcohol use of about 7.0 standard drinks of alcohol per week. She reports that she does not use drugs.    Family History:  Family History   Problem Relation Name Age of Onset    Hypertension Mother      Stroke Mother      Hypertension Father      Crohn's disease Brother      Macular degeneration Mother's Sister       Allergies:  Gabapentin    Current diet:   Breakfast: egg with toast, coffee  Lunch: tomato with a slice of bread   Dinner: salmon, sweet potatoes, green vegetables   Snacks: not really snacking, is does may have popcorn   Fluids: water, tea at night, has been working on increasing fluid intake   Will also have oranges occasionally   Has been eating more hardboiled eggs recently   Does note some increased hunger since reducing the dose down but is taking every 10 days instead of every 7 days.     Current exercise:   Has been working on increasing activity   Yoga once a week  Pilates twice per week    Previous Attempts at Weight Loss:   Was in a program with [facility] with  and son to eat a plant based diet, states this has led to some of their current dietary habits  Has tried intermittent fasting   Has also tried the Mediterranean diet    Rentho scale - states that this works with an azucena on her phone to calculate BMI and give dietary recommendations     The patient does not have a known family history of diabetes.    Adverse Effects:   Still has some hunger towards the end of the week but notes that it has been better    Does have some mild constipation, will occasionally use Miralax to help with constipation    Objective     Weight Management Pharmacotherapy:  Zepbound 10 mg under the skin once weekly (Fridays)  Has 2 vials left at home    Historical Weight Management Pharmacotherapy:   Wegovy 1 mg (switched to Zepbound due to it being cheaper)    Pertinent PMH Review:  PMH of Pancreatitis: No  PMH of Retinopathy: No  PMH of MTC: No  PMH of MEN 2: No    Lab Review  Lab  "Results   Component Value Date    BILITOT 0.8 03/04/2025    CALCIUM 9.4 03/04/2025    CO2 28 03/04/2025     03/04/2025    CREATININE 0.57 (L) 03/04/2025    GLUCOSE 80 03/04/2025    ALKPHOS 40 03/04/2025    K 4.2 03/04/2025    PROT 6.2 03/04/2025     03/04/2025    AST 16 03/04/2025    ALT 12 03/04/2025    BUN 15 03/04/2025    ANIONGAP 6 (L) 03/04/2025    MG 1.70 05/18/2021    PHOS 2.6 05/18/2021    ALBUMIN 4.2 03/04/2025    GFRF 89 07/14/2023     Lab Results   Component Value Date    TRIG 82 12/31/2024    CHOL 201 (H) 12/31/2024    LDLCALC 112 (H) 12/31/2024    HDL 72.9 12/31/2024     No results found for: \"HGBA1C\"  No components found for: \"UACR\"  The 10-year ASCVD risk score (Josi TONY, et al., 2019) is: 17.4%    Values used to calculate the score:      Age: 74 years      Sex: Female      Is Non- : No      Diabetic: No      Tobacco smoker: No      Systolic Blood Pressure: 130 mmHg      Is BP treated: Yes      HDL Cholesterol: 72.9 mg/dL      Total Cholesterol: 201 mg/dL    Drug Interactions:  No significant drug-drug interactions exist that require an adjustment to medication therapy.    Wt Readings from Last 6 Encounters:   03/07/25 53.9 kg (118 lb 12.8 oz)   01/07/25 53.3 kg (117 lb 9.6 oz)   10/22/24 52.8 kg (116 lb 6.4 oz)   10/17/24 52.6 kg (116 lb)   08/14/24 53.3 kg (117 lb 6.4 oz)   07/18/24 54.9 kg (121 lb)     Estimated body mass index is 21.04 kg/m² as calculated from the following:    Height as of 3/7/25: 1.6 m (5' 3\").    Weight as of 3/7/25: 53.9 kg (118 lb 12.8 oz).    Patient Reported Home Weights:   02/21/24: 146 lbs   03/18/24: 143 lbs   04/22/24: 134 lbs   06/17/24: 128 lbs   07/15/24: 120.2 lbs   08/12/24: 119 lbs   09/09/24: 115 lbs   11/04/24: 117.8 lbs  12/09/24: 115 lbs (calculated BMI 20.4 kg/m2)   03/03/25: 117 lbs   03/25/25: 116 lbs       Assessment/Plan   Problem List Items Addressed This Visit       Overweight with body mass index (BMI) of 29 to 29.9 " in adult - Primary     Current regimen includes Zepbound 10 mg under the skin once weekly. Patient's current weight reported as 116 lbs. Starting weight was 162 lbs on 10/19/23. Had lost 43 lbs (~29% of TBW) since starting therapy plan at lowest weight of 115 lbs in December 2024. Weight has remained stable since last encounter. Patient does report less hunger and cravings giving the medication every 7 days instead of every 10 days but still notes some increased hunger towards the end of the week. Discussed reducing down to 7.5 mg once weekly to see if she is able to maintain weight, however, patient would like to stay at the 10 mg dose for another month to ensure she is doing well on it.     Medication Changes:  CONTINUE  Zepbound 10 mg under the skin every 7 days    Future Considerations:  Discussed with patient that if she is able to continue to maintain weight lost should reduce down further to the 7.5 mg once weekly, she should ideally stay at the lowest dose needed to maintain weight loss    Monitoring and Education:  We will assess for any new/worsening side effects at next encounter. Patient has done very well with therapy so far so I do not for-see any issues.   Will obtain updated home weight at next follow-up to ensure she is maintaining weight and not losing more weight   Medications are dosed appropriately per most recent renal and hepatic function    We will plan to follow-up in 4 weeks to see how she has continued to do on her current dose of Zepbound. She was encouraged to reach out with any questions and/or concerns prior to then.          Relevant Medications    tirzepatide, weight loss, (Zepbound) 10 mg/0.5 mL injection    Other Relevant Orders    Referral to Clinical Pharmacy     Pharmacy Follow-Up: 04/28/2025   PCP Follow-Up: 09/09/2025    Iglesia Watson PharmD     Continue all meds under the continuation of care with the referring provider and clinical pharmacy team.

## 2025-03-27 ENCOUNTER — HOSPITAL ENCOUNTER (OUTPATIENT)
Dept: RADIOLOGY | Facility: HOSPITAL | Age: 75
Discharge: HOME | End: 2025-03-27
Payer: MEDICARE

## 2025-03-27 VITALS — HEIGHT: 63 IN | BODY MASS INDEX: 20.91 KG/M2 | WEIGHT: 118 LBS

## 2025-03-27 DIAGNOSIS — Z12.31 ENCOUNTER FOR SCREENING MAMMOGRAM FOR BREAST CANCER: ICD-10-CM

## 2025-03-27 PROCEDURE — 77063 BREAST TOMOSYNTHESIS BI: CPT | Performed by: RADIOLOGY

## 2025-03-27 PROCEDURE — 77067 SCR MAMMO BI INCL CAD: CPT | Performed by: RADIOLOGY

## 2025-03-27 PROCEDURE — 77067 SCR MAMMO BI INCL CAD: CPT

## 2025-03-28 ENCOUNTER — APPOINTMENT (OUTPATIENT)
Dept: PHARMACY | Facility: HOSPITAL | Age: 75
End: 2025-03-28
Payer: MEDICARE

## 2025-04-15 ENCOUNTER — APPOINTMENT (OUTPATIENT)
Dept: OTOLARYNGOLOGY | Facility: CLINIC | Age: 75
End: 2025-04-15
Payer: MEDICARE

## 2025-04-15 ENCOUNTER — CLINICAL SUPPORT (OUTPATIENT)
Dept: AUDIOLOGY | Facility: CLINIC | Age: 75
End: 2025-04-15
Payer: MEDICARE

## 2025-04-15 VITALS
SYSTOLIC BLOOD PRESSURE: 144 MMHG | DIASTOLIC BLOOD PRESSURE: 85 MMHG | BODY MASS INDEX: 20.91 KG/M2 | TEMPERATURE: 97.3 F | HEIGHT: 63 IN | WEIGHT: 118 LBS

## 2025-04-15 DIAGNOSIS — H90.3 BILATERAL SENSORINEURAL HEARING LOSS: Primary | ICD-10-CM

## 2025-04-15 DIAGNOSIS — H90.3 SENSORINEURAL HEARING LOSS (SNHL) OF BOTH EARS: Primary | ICD-10-CM

## 2025-04-15 PROCEDURE — 99214 OFFICE O/P EST MOD 30 MIN: CPT | Performed by: OTOLARYNGOLOGY

## 2025-04-15 PROCEDURE — 1160F RVW MEDS BY RX/DR IN RCRD: CPT | Performed by: OTOLARYNGOLOGY

## 2025-04-15 PROCEDURE — 92550 TYMPANOMETRY & REFLEX THRESH: CPT | Mod: 52 | Performed by: AUDIOLOGIST

## 2025-04-15 PROCEDURE — 1123F ACP DISCUSS/DSCN MKR DOCD: CPT | Performed by: OTOLARYNGOLOGY

## 2025-04-15 PROCEDURE — 1157F ADVNC CARE PLAN IN RCRD: CPT | Performed by: OTOLARYNGOLOGY

## 2025-04-15 PROCEDURE — 3008F BODY MASS INDEX DOCD: CPT | Performed by: OTOLARYNGOLOGY

## 2025-04-15 PROCEDURE — 3077F SYST BP >= 140 MM HG: CPT | Performed by: OTOLARYNGOLOGY

## 2025-04-15 PROCEDURE — 1036F TOBACCO NON-USER: CPT | Performed by: OTOLARYNGOLOGY

## 2025-04-15 PROCEDURE — 92557 COMPREHENSIVE HEARING TEST: CPT | Performed by: AUDIOLOGIST

## 2025-04-15 PROCEDURE — 1159F MED LIST DOCD IN RCRD: CPT | Performed by: OTOLARYNGOLOGY

## 2025-04-15 PROCEDURE — 3079F DIAST BP 80-89 MM HG: CPT | Performed by: OTOLARYNGOLOGY

## 2025-04-15 ASSESSMENT — ENCOUNTER SYMPTOMS: OCCASIONAL FEELINGS OF UNSTEADINESS: 0

## 2025-04-15 ASSESSMENT — PAIN SCALES - GENERAL: PAINLEVEL_OUTOF10: 0 - NO PAIN

## 2025-04-15 ASSESSMENT — PAIN - FUNCTIONAL ASSESSMENT: PAIN_FUNCTIONAL_ASSESSMENT: 0-10

## 2025-04-15 NOTE — PROGRESS NOTES
Impression:              1. Bilateral sensorineural hearing loss             Recommendations/Plan:  I reassured the patient that medically her ears are stable and her hearing has not worsened significantly since 2024.  At this point she is considering hearing aids and she would be a good candidate.  She will let us know if she has any sudden change in hearing.  She can otherwise follow-up as needed.    **This electronic medical record note was created with the use of voice recognition software.  Despite proofreading, typographical or grammatical errors may be present that could affect meaning of content **    Subjective:  Mara returns to the office today as a checkup on her hearing.  She denies any significant fluctuation in hearing.  She did have some mild upper respiratory infections over the winter but is doing much better now.  She continues to use her Flonase nasal spray as directed.  She denies any drainage from the ears or any vertigo or neurologic complaints.    Objective:    Visit Vitals  /85   Temp 36.3 °C (97.3 °F) (Temporal)        Current Outpatient Medications   Medication Instructions    amLODIPine (NORVASC) 2.5 mg, oral, Daily    amoxicillin (Amoxil) 500 mg capsule 4 capsules    azelastine (Astelin) 137 mcg (0.1 %) nasal spray 2 sprays, Each Nostril, 2 times daily, Use in each nostril as directed    estradiol (ESTRACE) 0.5 mg, oral, Daily    fluticasone (Flonase) 50 mcg/actuation nasal spray INHALE 1 SPRAY IN EACH NOSTRIL TWICE DAILY    ipratropium (Atrovent) 21 mcg (0.03 %) nasal spray 2 sprays, Each Nostril, 3 times daily    levothyroxine (SYNTHROID, LEVOXYL) 125 mcg, oral, Daily    liothyronine (CYTOMEL) 10 mcg, oral, Daily    norethindrone (AYGESTIN) 5 mg, oral, Daily    tirzepatide (weight loss) (ZEPBOUND) 10 mg, subcutaneous, Every 7 days    tretinoin (Retin-A) 0.1 % cream 1 Application    valACYclovir (Valtrex) 1 gram tablet TAKE 2 TABLET BY MOUTH EVERY TWELVE HOURS AS NEEDED FOR 1  DAY, REPEAT IF NEEDED        Allergies   Allergen Reactions    Gabapentin Hives        Physical Exam:  Right ear-external canal is patent.  Tympanic membrane is intact.  Middle ear space is clear.  Mastoid nontender.  Left ear-external canal is patent.  TM intact with good mobility.  No effusion.  Mastoid nontender.  Nose-clear no rhinorrhea, septum is straight.  Oral cavity-clear no lesions no significant postnasal drip    Results:  I reviewed her recent audiogram and her hearing is essentially the same from her previous audiogram in 2024.  She still has a high-frequency sensorineural loss in both ears.  Tympanic membrane's have normal mobility on tympanometry.  Word recognition scores 84% in the right ear 92% in the left ear.  Speech reception threshold is 25 dB bilaterally.    Procedure:  []    Juan A Nye, DO

## 2025-04-15 NOTE — PROGRESS NOTES
AUDIOLOGY ADULT AUDIOMETRIC EVALUATION      Name:  Mara Montoya  :  1950  Age:  74 y.o.  Date of Evaluation: 04/15/25    History:  Reason for visit:  Ms. Mara Montoya was seen today as part of the visit with Juan A Nye D.O. for an evaluation of hearing.   Chief Complaint   Patient presents with    Hearing Loss    Follow-up     The patient was seen for a follow up due to a slight asymmetry in the hearing loss. Stated she has noticed some difficulty hearing the softer sounds of speech and speech clarity. Mentioned her  is very soft spoken and at times she is unable to hear what was said. She has also noticed increased difficulty understanding speech while watching the television and may miss significant parts of the conversation. Mentioned she has noticed difficulty understand fast speakers. Previous hearing testing performed on 24 indicated a mild sloping to moderately severe to severe sensorineural hearing loss, slightly greater in the right ear. The patient stated she has not noticed any significant changes since the previous hearing testing.   Reported a history of sinus problems to include sinus congestion, drainage and headaches. Mentioned she has been utilizing a nasal spray, which she believes has been helpful.   Denied any major changes since her previous test.   Denied any current otalgia, aural fullness, chronic tinnitus, ear pressure, dizziness/vertigo, ear surgery, recent ear/sinus infections, recent falls, significant noise exposure, ear drainage, or sudden hearing loss.    EVALUATION     See Audiogram    RESULTS:    Otoscopic Evaluation:   Right Ear: Otoscopy revealed a clear healthy canal and a healthy tympanic membrane was visualized.   Left Ear: Otoscopy revealed a clear healthy canal and a healthy tympanic membrane was visualized.     Immittance:  Immittance Measures: 226 Hz   Right Ear: Tympanometric testing revealed a normal type A tympanogram with normal middle ear  pressure and normal static compliance.  Left Ear: Tympanometric testing revealed a normal type A tympanogram with normal middle ear pressure and normal static compliance.    Right: Ipsilateral acoustic reflexes were present at, 500-4,000 Hz, at expected sensation levels.  Left Ear: Ipsilateral acoustic reflexes were present at, 500-4,000 Hz, at expected sensation levels.     Test technique:  Pure Tone Audiometry via insert earphones    Reliability:   excellent    Pure Tone Audiometry:    Right Ear: Audiometric testing indicated normal peripheral hearing sensitivity through 750 Hz, gradually sloping to a mild sensorineural hearing loss through 4,000 Hz, sloping to a moderate to severe sensorineural hearing loss above.   Left Ear:   Audiometric testing indicated normal peripheral hearing sensitivity through 500 Hz, sloping to a mild sensorineural hearing loss through 4,000 Hz, sloping to a moderate to moderately severe sensorineural hearing loss above.   Note slight mid frequency cookie bite type configuration in each ear with slight asymmetry in the right ear above 1,500 Hz.       Speech Audiometry:   Right Ear:  Speech Reception Threshold (SRT) was obtained at 25 dBHL                  Word Recognition scores were good (84%) in quiet when words were presented at 65 dBHL  Left Ear:  Speech Reception Threshold (SRT) was obtained at  25 dBHL                  Word Recognition scores were excellent (92%) in quiet when words were presented at 65 dBHL  Testing was performed with recorded NU-6 speech words in quiet. Speech thresholds were in good agreement with the pure tone averages in each ear.     IMPRESSIONS:  Today's test results are hearing loss requiring medical/otologic and audiologic follow-up.  The patient was counseled with regard to the findings.    When compared to the previous test results of 9/12/24, there has been essentially no change in either ear.     Amplification needs:  Hearing aids were recommended due  to the hearing loss and the patient's concerns for hearing difficulties.     RECOMMENDATIONS:  * Continue medical follow up with Juan A Nye D.O.  * Retest as medically indicated, or sooner if a change in hearing sensitivity is noticed.   * Wear hearing protection while in the presence of loud sounds.   * Pending medical management and patient desire, consider returning for a hearing aid evaluation to discuss amplification options and communication needs. The patient was instructed to call their insurance to check for any hearing aid benefits and to determine if [facility] was in network for hearing aids.   * Use effective communication strategies such as asking the speaker to gain attention prior to speaking, speaking in the same room, repeating words that were heard, etc.  * Consider use of T.V. Ears, or like device, while watching television.    PATIENT EDUCATION:   Discussed results and recommendations with the patient and a copy of the hearing test was provided.  Questions were addressed and the patient was encouraged to contact our department should concerns arise.  Discussed speech intelligibility, cognitive load and listening effort. The patient was counseled although there are still significant hearing abilities, the sound sounds of speech are not coming in as easily as they once did. Counseled to consider hearing aids, to help reduce the amount of listening effort required by the brain.  The patient was seen from  9:00-9:30 am.

## 2025-04-28 ENCOUNTER — APPOINTMENT (OUTPATIENT)
Dept: PHARMACY | Facility: HOSPITAL | Age: 75
End: 2025-04-28
Payer: MEDICARE

## 2025-04-28 DIAGNOSIS — E66.3 OVERWEIGHT WITH BODY MASS INDEX (BMI) OF 29 TO 29.9 IN ADULT: Primary | ICD-10-CM

## 2025-04-28 NOTE — PROGRESS NOTES
Patient is sent at the request of Liza Hebert M* for my opinion regarding weight management.  My final recommendations will be communicated back to the requesting provider by way of shared medical record.    Subjective     Mara Montoya is a 74 y.o. female who is overweight as evidenced by her most recent BMI of 24.33 kg/m2 as of 24 complicated by hyperlipidemia and hypertension. She was started on Wegovy 0.25 mg once weekly by Dr. Mckeon on 10/19/23. At this time her BMI was 29.63 kg/m2.     She saw Dr. Mckeon on 25, no changes were made to her Zepbound dose at that time but it was noted that she is working on reducing her dose to the minimum maintenance dose to help control her appetite and maintain weight lost.     At last pharmacy encounter patient reported less hunger and cravings giving her Zepbound every 7 days instead of every 10 days. We discussed reducing down to the 7.5 mg once weekly dose, however, patient wanted to stay at the 10 mg dose for another month to ensure she is doing well on it. We are following-up today to ensure she has continued to maintain weight loss.     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    She has no past medical history of Personal history of irradiation.    Past Surgical History:  She has a past surgical history that includes  section, classic (10/28/2013) and Knee surgery (10/28/2013).    Social History:  She reports that she has never smoked. She has never been exposed to tobacco smoke. She  has never used smokeless tobacco. She reports current alcohol use of about 7.0 standard drinks of alcohol per week. She reports that she does not use drugs.    Family History:  Family History   Problem Relation Name Age of Onset    Hypertension Mother      Stroke Mother      Hypertension Father      Crohn's disease Brother      Macular degeneration Mother's Sister       Allergies:  Gabapentin    Current diet:   Breakfast: egg with toast, coffee  Lunch: tomato with a slice of bread   Dinner: salmon, sweet potatoes, green vegetables   Snacks: not really snacking, is does may have popcorn   Fluids: water, tea at night, has been working on increasing fluid intake   Will also have oranges occasionally   Has been eating more hardboiled eggs recently   Does note some increased hunger since reducing the dose down but is taking every 10 days instead of every 7 days.     Current exercise:   Has been working on increasing activity   Yoga once a week  Pilates twice per week    Previous Attempts at Weight Loss:   Was in a program with [facility] with  and son to eat a plant based diet, states this has led to some of their current dietary habits  Has tried intermittent fasting   Has also tried the Mediterranean diet    Rentho scale - states that this works with an azucena on her phone to calculate BMI and give dietary recommendations     The patient does not have a known family history of diabetes.    Adverse Effects:   Still has some hunger towards the end of the week but notes that it has been better    Does have some mild constipation, will occasionally use Miralax to help with constipation    Objective     Weight Management Pharmacotherapy:  Zepbound 10 mg under the skin once weekly (Fridays)    Historical Weight Management Pharmacotherapy:   Wegovy 1 mg (switched to Zepbound due to it being cheaper)    Pertinent PMH Review:  PMH of Pancreatitis: No  PMH of Retinopathy: No  PMH of MTC: No  PMH of MEN 2: No    Lab Review  Lab  "Results   Component Value Date    BILITOT 0.8 03/04/2025    CALCIUM 9.4 03/04/2025    CO2 28 03/04/2025     03/04/2025    CREATININE 0.57 (L) 03/04/2025    GLUCOSE 80 03/04/2025    ALKPHOS 40 03/04/2025    K 4.2 03/04/2025    PROT 6.2 03/04/2025     03/04/2025    AST 16 03/04/2025    ALT 12 03/04/2025    BUN 15 03/04/2025    ANIONGAP 6 (L) 03/04/2025    MG 1.70 05/18/2021    PHOS 2.6 05/18/2021    ALBUMIN 4.2 03/04/2025    GFRF 89 07/14/2023     Lab Results   Component Value Date    TRIG 82 12/31/2024    CHOL 201 (H) 12/31/2024    LDLCALC 112 (H) 12/31/2024    HDL 72.9 12/31/2024     No results found for: \"HGBA1C\"  No components found for: \"UACR\"  The 10-year ASCVD risk score (Josi TONY, et al., 2019) is: 20.9%    Values used to calculate the score:      Age: 74 years      Sex: Female      Is Non- : No      Diabetic: No      Tobacco smoker: No      Systolic Blood Pressure: 144 mmHg      Is BP treated: Yes      HDL Cholesterol: 72.9 mg/dL      Total Cholesterol: 201 mg/dL    Drug Interactions:  No significant drug-drug interactions exist that require an adjustment to medication therapy.    Wt Readings from Last 6 Encounters:   04/15/25 53.5 kg (118 lb)   03/27/25 53.5 kg (118 lb)   03/07/25 53.9 kg (118 lb 12.8 oz)   01/07/25 53.3 kg (117 lb 9.6 oz)   10/22/24 52.8 kg (116 lb 6.4 oz)   10/17/24 52.6 kg (116 lb)     Estimated body mass index is 20.9 kg/m² as calculated from the following:    Height as of 4/15/25: 1.6 m (5' 3\").    Weight as of 4/15/25: 53.5 kg (118 lb).    Patient Reported Home Weights:   02/21/24: 146 lbs   03/18/24: 143 lbs   04/22/24: 134 lbs   06/17/24: 128 lbs   07/15/24: 120.2 lbs   08/12/24: 119 lbs   09/09/24: 115 lbs   11/04/24: 117.8 lbs  12/09/24: 115 lbs (calculated BMI 20.4 kg/m2)   03/03/25: 117 lbs   03/25/25: 116 lbs   04/28/25: 116.8 lbs       Assessment/Plan   Problem List Items Addressed This Visit       Overweight with body mass index (BMI) of 29 " to 29.9 in adult - Primary    Current regimen includes Zepbound 10 mg under the skin once weekly. Patient's current weight reported as 116.8 lbs. Starting weight was 162 lbs on 10/19/23. Had lost 43 lbs (~29% of TBW) since starting therapy plan at lowest weight of 115 lbs in December 2024. Weight has remained stable since last encounter. She also denies any side effects with medication.     Discussed with patient that ideally we would try to reduce down to 7.5 mg once weekly to see if she is able to maintain weight lost at lower dose. Patient would like to continue on the 10 mg dose of Zepbound for another few months. Since she has been on 10 mg once weekly for ~2 months now, she was agreeable to continuing for another month and then if still stable reducing down to 7.5 mg once weekly next month.     Medication Changes:  CONTINUE  Zepbound 10 mg under the skin every 7 days    Future Considerations:  If weight has remained stable or decreased, will plan to reduce dose down to 7.5 mg once weekly at next follow-up    Monitoring and Education:  We will assess for any new/worsening side effects at next encounter. Patient has done very well with therapy so far so I do not for-see any issues.   Will obtain updated home weight at next follow-up to ensure she is maintaining weight and not losing more weight   Medications are dosed appropriately per most recent renal and hepatic function    We will plan to follow-up in ~4 weeks to see how she has continued to do on her current dose of Zepbound and discuss dose reduction. She was encouraged to reach out with any questions and/or concerns prior to then.          Relevant Medications    tirzepatide, weight loss, (Zepbound) 10 mg/0.5 mL injection    Other Relevant Orders    Referral to Clinical Pharmacy     Pharmacy Follow-Up: 05/27/2025  PCP Follow-Up: 09/09/2025    Iglesia Watson PharmD     Continue all meds under the continuation of care with the referring provider and  clinical pharmacy team.

## 2025-04-28 NOTE — ASSESSMENT & PLAN NOTE
Current regimen includes Zepbound 10 mg under the skin once weekly. Patient's current weight reported as 116.8 lbs. Starting weight was 162 lbs on 10/19/23. Had lost 43 lbs (~29% of TBW) since starting therapy plan at lowest weight of 115 lbs in December 2024. Weight has remained stable since last encounter. She also denies any side effects with medication.     Discussed with patient that ideally we would try to reduce down to 7.5 mg once weekly to see if she is able to maintain weight lost at lower dose. Patient would like to continue on the 10 mg dose of Zepbound for another few months. Since she has been on 10 mg once weekly for ~2 months now, she was agreeable to continuing for another month and then if still stable reducing down to 7.5 mg once weekly next month.     Medication Changes:  CONTINUE  Zepbound 10 mg under the skin every 7 days    Future Considerations:  If weight has remained stable or decreased, will plan to reduce dose down to 7.5 mg once weekly at next follow-up    Monitoring and Education:  We will assess for any new/worsening side effects at next encounter. Patient has done very well with therapy so far so I do not for-see any issues.   Will obtain updated home weight at next follow-up to ensure she is maintaining weight and not losing more weight   Medications are dosed appropriately per most recent renal and hepatic function    We will plan to follow-up in ~4 weeks to see how she has continued to do on her current dose of Zepbound and discuss dose reduction. She was encouraged to reach out with any questions and/or concerns prior to then.

## 2025-04-30 DIAGNOSIS — B00.1 COLD SORE: ICD-10-CM

## 2025-04-30 RX ORDER — VALACYCLOVIR HYDROCHLORIDE 1 G/1
TABLET, FILM COATED ORAL
Qty: 24 TABLET | Refills: 1 | Status: SHIPPED | OUTPATIENT
Start: 2025-04-30

## 2025-05-27 ENCOUNTER — APPOINTMENT (OUTPATIENT)
Dept: PHARMACY | Facility: HOSPITAL | Age: 75
End: 2025-05-27
Payer: MEDICARE

## 2025-05-27 DIAGNOSIS — E66.3 OVERWEIGHT WITH BODY MASS INDEX (BMI) OF 29 TO 29.9 IN ADULT: Primary | ICD-10-CM

## 2025-05-27 NOTE — ASSESSMENT & PLAN NOTE
Current regimen includes Zepbound 10 mg under the skin once weekly. Patient's current weight reported as 116.8 lbs. Starting weight was 162 lbs on 10/19/23. Had lost 43 lbs (~29% of TBW) since starting therapy plan at lowest weight of 115 lbs in December 2024. Weight has remained stable since last encounter. Patient is agreeable today to trying to reduce her dose down and see if she is able to maintain weight lost at a lower dose.     Medication Changes:  DECREASE  Zepbound 7.5 mg under the skin every 7 days (decreased from 10 mg)  Will give last 10 mg dose on Monday 06/02  Will decrease down to 7.5 mg effective with dose on 06/09    Future Considerations:  Will likely continue at 7.5 mg once weekly for maintenance unless she were to lose more weight in the future, then would want to reduce down further to 5 mg once weekly    Monitoring and Education:  We will assess for any new/worsening side effects at next encounter. Patient has done very well with therapy so far so I do not for-see any issues.   Will obtain updated home weight at next follow-up to ensure she is maintaining weight and not losing more weight or gaining weight with dose reduction   Medications are dosed appropriately per most recent renal and hepatic function    We will plan to follow-up in 4 weeks to see how she has done with the Zepbound dose reduction. She was encouraged to reach out with any questions and/or concerns prior to then.

## 2025-05-27 NOTE — PROGRESS NOTES
Patient is sent at the request of Liza Hebert M* for my opinion regarding weight management.  My final recommendations will be communicated back to the requesting provider by way of shared medical record.    Subjective     Mara Montoya is a 74 y.o. female who is overweight as evidenced by her most recent BMI of 24.33 kg/m2 as of 24 complicated by hyperlipidemia and hypertension. She was started on Wegovy 0.25 mg once weekly by Dr. Mckeon on 10/19/23. At this time her BMI was 29.63 kg/m2.     She saw Dr. Mckeon on 25, no changes were made to her Zepbound dose at that time but it was noted that she is working on reducing her dose to the minimum maintenance dose to help control her appetite and maintain weight lost.     At last pharmacy encounter we discussed continuing on 10 mg once weekly vs trying to titrate down to 7.5 mg once weekly to see if she is able to maintain weight lost. She wanted to continue on the 10 mg dose for another few months. She has has been on the 10 mg at once weekly for ~2 months she was agreeable to continuing for one more month and then reducing down to 7.5 mg once weekly if weight is still stable. We are following-up today to see how she has done over the past month.     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    She has no past medical history of Personal history of irradiation.    Past Surgical History:  She has a past surgical history that includes  section, classic (10/28/2013) and Knee surgery  (10/28/2013).    Social History:  She reports that she has never smoked. She has never been exposed to tobacco smoke. She has never used smokeless tobacco. She reports current alcohol use of about 7.0 standard drinks of alcohol per week. She reports that she does not use drugs.    Family History:  Family History   Problem Relation Name Age of Onset    Hypertension Mother      Stroke Mother      Hypertension Father      Crohn's disease Brother      Macular degeneration Mother's Sister       Allergies:  Gabapentin    Current diet:   No changes reported since last encounter  Breakfast: egg with toast, coffee  Lunch: tomato with a slice of bread   Dinner: salmon, sweet potatoes, green vegetables   Snacks: not really snacking, is does may have popcorn   Fluids: water, tea at night, has been working on increasing fluid intake   Will also have oranges occasionally   Has been eating more hardboiled eggs recently   Does note some increased hunger since reducing the dose down but is taking every 10 days instead of every 7 days.     Current exercise:   No changes reported since last encounter   Yoga once a week  Pilates twice per week    Previous Attempts at Weight Loss:   Was in a program with [facility] with  and son to eat a plant based diet, states this has led to some of their current dietary habits  Has tried intermittent fasting   Has also tried the Mediterranean diet    Rentho scale - states that this works with an azucena on her phone to calculate BMI and give dietary recommendations     The patient does not have a known family history of diabetes.    Adverse Effects:   None reported today, takes Miralax once per week which keeps constipation at bay     Objective     Weight Management Pharmacotherapy:  Zepbound 10 mg under the skin once weekly (Mondays)  Has 1 vial left at home    Historical Weight Management Pharmacotherapy:   Wegovy 1 mg (switched to Zepbound due to it being cheaper)    Pertinent PMH Review:  PMH of  "Pancreatitis: No  PMH of Retinopathy: No  PMH of MTC: No  PMH of MEN 2: No    Lab Review  Lab Results   Component Value Date    BILITOT 0.8 03/04/2025    CALCIUM 9.4 03/04/2025    CO2 28 03/04/2025     03/04/2025    CREATININE 0.57 (L) 03/04/2025    GLUCOSE 80 03/04/2025    ALKPHOS 40 03/04/2025    K 4.2 03/04/2025    PROT 6.2 03/04/2025     03/04/2025    AST 16 03/04/2025    ALT 12 03/04/2025    BUN 15 03/04/2025    ANIONGAP 6 (L) 03/04/2025    MG 1.70 05/18/2021    PHOS 2.6 05/18/2021    ALBUMIN 4.2 03/04/2025    GFRF 89 07/14/2023     Lab Results   Component Value Date    TRIG 82 12/31/2024    CHOL 201 (H) 12/31/2024    LDLCALC 112 (H) 12/31/2024    HDL 72.9 12/31/2024     No results found for: \"HGBA1C\"  No components found for: \"UACR\"  The 10-year ASCVD risk score (Josi TONY, et al., 2019) is: 20.9%    Values used to calculate the score:      Age: 74 years      Sex: Female      Is Non- : No      Diabetic: No      Tobacco smoker: No      Systolic Blood Pressure: 144 mmHg      Is BP treated: Yes      HDL Cholesterol: 72.9 mg/dL      Total Cholesterol: 201 mg/dL    Drug Interactions:  No significant drug-drug interactions exist that require an adjustment to medication therapy.    Wt Readings from Last 6 Encounters:   04/15/25 53.5 kg (118 lb)   03/27/25 53.5 kg (118 lb)   03/07/25 53.9 kg (118 lb 12.8 oz)   01/07/25 53.3 kg (117 lb 9.6 oz)   10/22/24 52.8 kg (116 lb 6.4 oz)   10/17/24 52.6 kg (116 lb)     Estimated body mass index is 20.9 kg/m² as calculated from the following:    Height as of 4/15/25: 1.6 m (5' 3\").    Weight as of 4/15/25: 53.5 kg (118 lb).    Patient Reported Home Weights:   02/21/24: 146 lbs   03/18/24: 143 lbs   04/22/24: 134 lbs   06/17/24: 128 lbs   07/15/24: 120.2 lbs   08/12/24: 119 lbs   09/09/24: 115 lbs   11/04/24: 117.8 lbs  12/09/24: 115 lbs   03/03/25: 117 lbs   03/25/25: 116 lbs   04/28/25: 116.8 lbs   05/27/25: 116.8 lbs       Assessment/Plan "   Problem List Items Addressed This Visit       Overweight with body mass index (BMI) of 29 to 29.9 in adult - Primary    Current regimen includes Zepbound 10 mg under the skin once weekly. Patient's current weight reported as 116.8 lbs. Starting weight was 162 lbs on 10/19/23. Had lost 43 lbs (~29% of TBW) since starting therapy plan at lowest weight of 115 lbs in December 2024. Weight has remained stable since last encounter. Patient is agreeable today to trying to reduce her dose down and see if she is able to maintain weight lost at a lower dose.     Medication Changes:  DECREASE  Zepbound 7.5 mg under the skin every 7 days (decreased from 10 mg)  Will give last 10 mg dose on Monday 06/02  Will decrease down to 7.5 mg effective with dose on 06/09    Future Considerations:  Will likely continue at 7.5 mg once weekly for maintenance unless she were to lose more weight in the future, then would want to reduce down further to 5 mg once weekly    Monitoring and Education:  We will assess for any new/worsening side effects at next encounter. Patient has done very well with therapy so far so I do not for-see any issues.   Will obtain updated home weight at next follow-up to ensure she is maintaining weight and not losing more weight or gaining weight with dose reduction   Medications are dosed appropriately per most recent renal and hepatic function    We will plan to follow-up in 4 weeks to see how she has done with the Zepbound dose reduction. She was encouraged to reach out with any questions and/or concerns prior to then.          Relevant Medications    tirzepatide, weight loss, (Zepbound) 7.5 mg/0.5 mL injection    Other Relevant Orders    Referral to Clinical Pharmacy     Pharmacy Follow-Up: 06/24/2025   PCP Follow-Up: 09/09/2025    Iglesia Watson PharmD     Continue all meds under the continuation of care with the referring provider and clinical pharmacy team.

## 2025-06-13 DIAGNOSIS — E66.3 OVERWEIGHT WITH BODY MASS INDEX (BMI) OF 29 TO 29.9 IN ADULT: ICD-10-CM

## 2025-06-13 RX ORDER — TIRZEPATIDE 7.5 MG/.5ML
INJECTION, SOLUTION SUBCUTANEOUS
Qty: 2 ML | Refills: 1 | Status: SHIPPED | OUTPATIENT
Start: 2025-06-13

## 2025-06-24 ENCOUNTER — APPOINTMENT (OUTPATIENT)
Dept: PHARMACY | Facility: HOSPITAL | Age: 75
End: 2025-06-24
Payer: MEDICARE

## 2025-06-24 DIAGNOSIS — E66.3 OVERWEIGHT WITH BODY MASS INDEX (BMI) OF 29 TO 29.9 IN ADULT: Primary | ICD-10-CM

## 2025-06-24 NOTE — PROGRESS NOTES
Patient is sent at the request of Liza Hebert M* for my opinion regarding weight management.  My final recommendations will be communicated back to the requesting provider by way of shared medical record.    Subjective     Mara Montoya is a 74 y.o. female who was overweight as evidenced by her starting BMI of 29.63 kg/m2 complicated by hyperlipidemia and hypertension. She was started on Wegovy 0.25 mg once weekly by Dr. Mckeon on 10/19/23. She was switched to Zepbound in early  as it was cheaper than Wegovy.     She saw Dr. Mckeon on 25, no changes were made to her Zepbound dose at that time but it was noted that she is working on reducing her dose to the minimum maintenance dose to help control her appetite and maintain weight lost.     At last pharmacy encounter she was agreeable to reducing her dose down to 7.5 mg once weekly to see if she is able to maintain weight lost. We are following-up today to see how she has done with it.     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    She has no past medical history of Personal history of irradiation.    Past Surgical History:  She has a past surgical history that includes  section, classic (10/28/2013) and Knee surgery (10/28/2013).    Social History:  She reports that she has never smoked. She has never been exposed to tobacco smoke. She has never used smokeless tobacco. She reports current alcohol use of about 7.0 standard drinks of alcohol per week. She reports that she does not use  drugs.    Family History:  Family History   Problem Relation Name Age of Onset    Hypertension Mother      Stroke Mother      Hypertension Father      Crohn's disease Brother      Macular degeneration Mother's Sister       Allergies:  Gabapentin    Current diet:   No changes reported since last encounter  Breakfast: egg with toast, coffee  Lunch: tomato with a slice of bread   Dinner: salmon, sweet potatoes, green vegetables   Snacks: not really snacking, is does may have popcorn   Fluids: water, tea at night, has been working on increasing fluid intake   Will also have oranges occasionally   Has been eating more hardboiled eggs recently   Does note some increased hunger since reducing the dose down but is taking every 10 days instead of every 7 days.     Current exercise:   No changes reported since last encounter   Yoga once a week  Pilates twice per week    Previous Attempts at Weight Loss:   Was in a program with [facility] with  and son to eat a plant based diet, states this has led to some of their current dietary habits  Has tried intermittent fasting   Has also tried the Mediterranean diet    Rentho scale - states that this works with an azucena on her phone to calculate BMI and give dietary recommendations     The patient does not have a known family history of diabetes.    Adverse Effects:   None reported today, takes Miralax once per week which keeps constipation at bay     Objective     Weight Management Pharmacotherapy:  Zepbound 7.5 mg under the skin once weekly (Mondays)    Historical Weight Management Pharmacotherapy:   Wegovy 1 mg (switched to Zepbound due to it being cheaper)    Pertinent PMH Review:  PMH of Pancreatitis: No  PMH of Retinopathy: No  PMH of MTC: No  PMH of MEN 2: No    Lab Review  Lab Results   Component Value Date    BILITOT 0.8 03/04/2025    CALCIUM 9.4 03/04/2025    CO2 28 03/04/2025     03/04/2025    CREATININE 0.57 (L) 03/04/2025    GLUCOSE 80 03/04/2025    ALKPHOS 40  "03/04/2025    K 4.2 03/04/2025    PROT 6.2 03/04/2025     03/04/2025    AST 16 03/04/2025    ALT 12 03/04/2025    BUN 15 03/04/2025    ANIONGAP 6 (L) 03/04/2025    MG 1.70 05/18/2021    PHOS 2.6 05/18/2021    ALBUMIN 4.2 03/04/2025    GFRF 89 07/14/2023     Lab Results   Component Value Date    TRIG 82 12/31/2024    CHOL 201 (H) 12/31/2024    LDLCALC 112 (H) 12/31/2024    HDL 72.9 12/31/2024     No results found for: \"HGBA1C\"  No components found for: \"UACR\"  The 10-year ASCVD risk score (Josi TONY, et al., 2019) is: 20.9%    Values used to calculate the score:      Age: 74 years      Sex: Female      Is Non- : No      Diabetic: No      Tobacco smoker: No      Systolic Blood Pressure: 144 mmHg      Is BP treated: Yes      HDL Cholesterol: 72.9 mg/dL      Total Cholesterol: 201 mg/dL    Drug Interactions:  No significant drug-drug interactions exist that require an adjustment to medication therapy.    Wt Readings from Last 6 Encounters:   04/15/25 53.5 kg (118 lb)   03/27/25 53.5 kg (118 lb)   03/07/25 53.9 kg (118 lb 12.8 oz)   01/07/25 53.3 kg (117 lb 9.6 oz)   10/22/24 52.8 kg (116 lb 6.4 oz)   10/17/24 52.6 kg (116 lb)     Estimated body mass index is 20.9 kg/m² as calculated from the following:    Height as of 4/15/25: 1.6 m (5' 3\").    Weight as of 4/15/25: 53.5 kg (118 lb).    Patient Reported Home Weights:   02/21/24: 146 lbs   03/18/24: 143 lbs   04/22/24: 134 lbs   06/17/24: 128 lbs   07/15/24: 120.2 lbs   08/12/24: 119 lbs   09/09/24: 115 lbs   11/04/24: 117.8 lbs  12/09/24: 115 lbs   03/03/25: 117 lbs   03/25/25: 116 lbs   04/28/25: 116.8 lbs   05/27/25: 116.8 lbs   06/24/25: 117 lbs       Assessment/Plan   Problem List Items Addressed This Visit       Overweight with body mass index (BMI) of 29 to 29.9 in adult - Primary    Current regimen includes Zepbound 7.5 mg under the skin once weekly. Patient's current weight reported as 117 lbs. Starting weight was 162 lbs on " 10/19/23. Had lost 43 lbs (~29% of TBW) since starting therapy plan at lowest weight of 115 lbs in December 2024. Weight has remained stable since last encounter despite dose decrease. She does note some slight increased hunger but has been manageable and has worked at reducing portion sizes. Will continue at current dose.     Medication Changes:  CONTINUE  Zepbound 7.5 mg under the skin every 7 days     Future Considerations:  Will likely continue at 7.5 mg once weekly for maintenance unless she were to lose more weight in the future, then would want to reduce down further to 5 mg once weekly  If she were to experience weight gain, could increase back up to 10 mg once weekly but weight gain would have to be more than typical weight fluctuations     Monitoring and Education:  We will assess for any new/worsening side effects at next encounter. Patient has done very well with therapy so far so I do not for-see any issues.   Will obtain updated home weight at next follow-up to ensure she is maintaining weight and not losing more weight or gaining weight.  Medications are dosed appropriately per most recent renal and hepatic function    We will plan to follow-up in ~2 months to see how she has done continuing with her current dose of Zepbound. She was encouraged to reach out with any questions and/or concerns prior to then.          Relevant Orders    Referral to Clinical Pharmacy     Pharmacy Follow-Up: 08/19/2025   PCP Follow-Up: 09/09/2025    Iglesia Watson, PharmD     Continue all meds under the continuation of care with the referring provider and clinical pharmacy team.

## 2025-06-24 NOTE — ASSESSMENT & PLAN NOTE
Current regimen includes Zepbound 7.5 mg under the skin once weekly. Patient's current weight reported as 117 lbs. Starting weight was 162 lbs on 10/19/23. Had lost 43 lbs (~29% of TBW) since starting therapy plan at lowest weight of 115 lbs in December 2024. Weight has remained stable since last encounter despite dose decrease. She does note some slight increased hunger but has been manageable and has worked at reducing portion sizes. Will continue at current dose.     Medication Changes:  CONTINUE  Zepbound 7.5 mg under the skin every 7 days     Future Considerations:  Will likely continue at 7.5 mg once weekly for maintenance unless she were to lose more weight in the future, then would want to reduce down further to 5 mg once weekly  If she were to experience weight gain, could increase back up to 10 mg once weekly but weight gain would have to be more than typical weight fluctuations     Monitoring and Education:  We will assess for any new/worsening side effects at next encounter. Patient has done very well with therapy so far so I do not for-see any issues.   Will obtain updated home weight at next follow-up to ensure she is maintaining weight and not losing more weight or gaining weight.  Medications are dosed appropriately per most recent renal and hepatic function    We will plan to follow-up in ~2 months to see how she has done continuing with her current dose of Zepbound. She was encouraged to reach out with any questions and/or concerns prior to then.

## 2025-07-17 ENCOUNTER — APPOINTMENT (OUTPATIENT)
Dept: DERMATOLOGY | Facility: CLINIC | Age: 75
End: 2025-07-17
Payer: MEDICARE

## 2025-08-06 DIAGNOSIS — E03.9 HYPOTHYROIDISM, UNSPECIFIED: ICD-10-CM

## 2025-08-06 RX ORDER — LEVOTHYROXINE SODIUM 125 UG/1
125 TABLET ORAL DAILY
Qty: 90 TABLET | Refills: 2 | Status: SHIPPED | OUTPATIENT
Start: 2025-08-06

## 2025-08-19 ENCOUNTER — APPOINTMENT (OUTPATIENT)
Dept: PHARMACY | Facility: HOSPITAL | Age: 75
End: 2025-08-19
Payer: MEDICARE

## 2025-08-19 DIAGNOSIS — E66.3 OVERWEIGHT WITH BODY MASS INDEX (BMI) OF 29 TO 29.9 IN ADULT: Primary | ICD-10-CM

## 2025-08-19 RX ORDER — TIRZEPATIDE 7.5 MG/.5ML
7.5 INJECTION, SOLUTION SUBCUTANEOUS
Qty: 2 ML | Refills: 2 | Status: SHIPPED | OUTPATIENT
Start: 2025-08-19

## 2025-08-19 RX ORDER — PREGABALIN 50 MG/1
50 CAPSULE ORAL 2 TIMES DAILY
COMMUNITY

## 2025-09-09 ENCOUNTER — APPOINTMENT (OUTPATIENT)
Dept: PRIMARY CARE | Facility: CLINIC | Age: 75
End: 2025-09-09
Payer: MEDICARE

## 2025-11-11 ENCOUNTER — APPOINTMENT (OUTPATIENT)
Dept: PHARMACY | Facility: HOSPITAL | Age: 75
End: 2025-11-11
Payer: MEDICARE